# Patient Record
Sex: MALE | Race: WHITE | Employment: OTHER | URBAN - METROPOLITAN AREA
[De-identification: names, ages, dates, MRNs, and addresses within clinical notes are randomized per-mention and may not be internally consistent; named-entity substitution may affect disease eponyms.]

---

## 2017-08-03 ENCOUNTER — HOSPITAL ENCOUNTER (OUTPATIENT)
Dept: MRI IMAGING | Age: 52
Discharge: HOME | End: 2017-08-03
Attending: NEUROLOGICAL SURGERY
Payer: COMMERCIAL

## 2017-08-03 DIAGNOSIS — M54.16 LUMBAR RADICULOPATHY: ICD-10-CM

## 2017-08-03 PROCEDURE — 72148 MRI LUMBAR SPINE W/O DYE: CPT

## 2017-08-10 PROBLEM — E55.9 VITAMIN D DEFICIENCY: Status: ACTIVE | Noted: 2017-08-10

## 2017-08-10 PROBLEM — F51.04 CHRONIC INSOMNIA: Status: ACTIVE | Noted: 2017-08-10

## 2017-08-10 PROBLEM — R39.198 DIFFICULTY IN URINATION: Status: ACTIVE | Noted: 2017-08-10

## 2017-08-10 PROBLEM — F11.20 OPIOID DEPENDENCE (HCC): Status: ACTIVE | Noted: 2017-08-10

## 2017-08-10 PROBLEM — E03.9 HYPOTHYROIDISM: Status: ACTIVE | Noted: 2017-08-10

## 2017-08-10 PROBLEM — E29.1 HYPOGONADISM MALE: Status: ACTIVE | Noted: 2017-08-10

## 2017-08-10 PROBLEM — M79.646 THUMB PAIN: Status: ACTIVE | Noted: 2017-08-10

## 2017-08-10 RX ORDER — LANOLIN ALCOHOL/MO/W.PET/CERES
CREAM (GRAM) TOPICAL DAILY
COMMUNITY
End: 2018-08-10 | Stop reason: ALTCHOICE

## 2017-08-10 RX ORDER — TESTOSTERONE 30 MG/1.5ML
SOLUTION TOPICAL
COMMUNITY
End: 2017-08-22

## 2017-08-10 RX ORDER — BISMUTH SUBSALICYLATE 262 MG
1 TABLET,CHEWABLE ORAL DAILY
COMMUNITY

## 2017-08-13 PROBLEM — N20.0 KIDNEY STONES: Status: ACTIVE | Noted: 2017-08-13

## 2017-08-13 PROBLEM — G89.29 CHRONIC BACK PAIN: Status: ACTIVE | Noted: 2017-08-13

## 2017-08-13 PROBLEM — M54.9 CHRONIC BACK PAIN: Status: ACTIVE | Noted: 2017-08-13

## 2017-08-13 PROBLEM — E27.1 ADDISON'S DISEASE (HCC): Status: ACTIVE | Noted: 2017-08-13

## 2017-08-22 ENCOUNTER — OFFICE VISIT (OUTPATIENT)
Dept: INTERNAL MEDICINE CLINIC | Age: 52
End: 2017-08-22

## 2017-08-22 VITALS
HEART RATE: 74 BPM | WEIGHT: 203 LBS | SYSTOLIC BLOOD PRESSURE: 118 MMHG | DIASTOLIC BLOOD PRESSURE: 80 MMHG | BODY MASS INDEX: 28.42 KG/M2 | HEIGHT: 71 IN

## 2017-08-22 DIAGNOSIS — M54.41 CHRONIC RIGHT-SIDED LOW BACK PAIN WITH RIGHT-SIDED SCIATICA: ICD-10-CM

## 2017-08-22 DIAGNOSIS — D75.1 POLYCYTHEMIA: ICD-10-CM

## 2017-08-22 DIAGNOSIS — R07.2 PRECORDIAL PAIN: ICD-10-CM

## 2017-08-22 DIAGNOSIS — E27.1 ADDISON'S DISEASE (HCC): ICD-10-CM

## 2017-08-22 DIAGNOSIS — F11.20 UNCOMPLICATED OPIOID DEPENDENCE (HCC): ICD-10-CM

## 2017-08-22 DIAGNOSIS — F17.210 CIGARETTE NICOTINE DEPENDENCE WITHOUT COMPLICATION: ICD-10-CM

## 2017-08-22 DIAGNOSIS — Z00.00 ROUTINE PHYSICAL EXAMINATION: Primary | ICD-10-CM

## 2017-08-22 DIAGNOSIS — R97.20 RISING PSA LEVEL: ICD-10-CM

## 2017-08-22 DIAGNOSIS — G89.29 CHRONIC RIGHT-SIDED LOW BACK PAIN WITH RIGHT-SIDED SCIATICA: ICD-10-CM

## 2017-08-22 DIAGNOSIS — E29.1 HYPOGONADISM MALE: ICD-10-CM

## 2017-08-22 DIAGNOSIS — E03.9 ACQUIRED HYPOTHYROIDISM: ICD-10-CM

## 2017-08-22 PROBLEM — R07.9 CHEST PAIN: Status: ACTIVE | Noted: 2017-08-22

## 2017-08-22 RX ORDER — PHENOL/SODIUM PHENOLATE
20 AEROSOL, SPRAY (ML) MUCOUS MEMBRANE
Status: ON HOLD | COMMUNITY
End: 2022-09-30

## 2017-08-22 RX ORDER — TESTOSTERONE CYPIONATE 200 MG/ML
100 INJECTION INTRAMUSCULAR EVERY 2 WEEKS
COMMUNITY

## 2017-08-22 RX ORDER — DICLOFENAC SODIUM 75 MG/1
75 TABLET, DELAYED RELEASE ORAL AS NEEDED
COMMUNITY
End: 2018-08-22

## 2017-08-22 NOTE — PATIENT INSTRUCTIONS
Back Pain: Care Instructions  Your Care Instructions    Back pain has many possible causes. It is often related to problems with muscles and ligaments of the back. It may also be related to problems with the nerves, discs, or bones of the back. Moving, lifting, standing, sitting, or sleeping in an awkward way can strain the back. Sometimes you don't notice the injury until later. Arthritis is another common cause of back pain. Although it may hurt a lot, back pain usually improves on its own within several weeks. Most people recover in 12 weeks or less. Using good home treatment and being careful not to stress your back can help you feel better sooner. Follow-up care is a key part of your treatment and safety. Be sure to make and go to all appointments, and call your doctor if you are having problems. Its also a good idea to know your test results and keep a list of the medicines you take. How can you care for yourself at home? · Sit or lie in positions that are most comfortable and reduce your pain. Try one of these positions when you lie down:  ¨ Lie on your back with your knees bent and supported by large pillows. ¨ Lie on the floor with your legs on the seat of a sofa or chair. Lyndy Newer on your side with your knees and hips bent and a pillow between your legs. ¨ Lie on your stomach if it does not make pain worse. · Do not sit up in bed, and avoid soft couches and twisted positions. Bed rest can help relieve pain at first, but it delays healing. Avoid bed rest after the first day of back pain. · Change positions every 30 minutes. If you must sit for long periods of time, take breaks from sitting. Get up and walk around, or lie in a comfortable position. · Try using a heating pad on a low or medium setting for 15 to 20 minutes every 2 or 3 hours. Try a warm shower in place of one session with the heating pad. · You can also try an ice pack for 10 to 15 minutes every 2 to 3 hours.  Put a thin cloth between the ice pack and your skin. · Take pain medicines exactly as directed. ¨ If the doctor gave you a prescription medicine for pain, take it as prescribed. ¨ If you are not taking a prescription pain medicine, ask your doctor if you can take an over-the-counter medicine. · Take short walks several times a day. You can start with 5 to 10 minutes, 3 or 4 times a day, and work up to longer walks. Walk on level surfaces and avoid hills and stairs until your back is better. · Return to work and other activities as soon as you can. Continued rest without activity is usually not good for your back. · To prevent future back pain, do exercises to stretch and strengthen your back and stomach. Learn how to use good posture, safe lifting techniques, and proper body mechanics. When should you call for help? Call your doctor now or seek immediate medical care if:  · You have new or worsening numbness in your legs. · You have new or worsening weakness in your legs. (This could make it hard to stand up.)  · You lose control of your bladder or bowels. Watch closely for changes in your health, and be sure to contact your doctor if:  · Your pain gets worse. · You are not getting better after 2 weeks. Where can you learn more? Go to http://chuck-edgardo.info/. Enter B909 in the search box to learn more about \"Back Pain: Care Instructions. \"  Current as of: March 21, 2017  Content Version: 11.3  © 4038-9681 [employer]. Care instructions adapted under license by [employer] (which disclaims liability or warranty for this information). If you have questions about a medical condition or this instruction, always ask your healthcare professional. [address] any warranty or liability for your use of this information.

## 2017-08-22 NOTE — PROGRESS NOTES
Lanny De La Cruz. is a 47 y.o. male presenting for Complete Physical  .     1. Have you been to the ER, urgent care clinic since your last visit? Hospitalized since your last visit? No    2. Have you seen or consulted any other health care providers outside of the [address] [place] [address] since your last visit? Include any pap smears or colon screening. Yes When: 8/2017 Where: Dr. Lucrecia Hernandez Reason for visit: endocrinologist    No flowsheet data found. No flowsheet data found. PHQ over the last two weeks 8/22/2017   Little interest or pleasure in doing things Not at all   Feeling down, depressed or hopeless Not at all   Total Score PHQ 2 0       There are no discontinued medications.

## 2017-08-22 NOTE — MR AVS SNAPSHOT
Visit Information Date & Time Provider Department Dept. Phone Encounter #  
 8/22/2017  3:00 PM Webster Litten, MD [facility]  [phone] [id number] Follow-up Instructions Return in about 3 months (around 11/22/2017) for As previously scheduled. Upcoming Health Maintenance Date Due Pneumococcal 19-64 Medium Risk (1 of 1 - PPSV23) 7/31/1985 DTaP/Tdap/Td series (1 - Tdap) 7/31/1987 FOBT Q 1 YEAR AGE 50-75 7/31/2016 INFLUENZA AGE 9 TO ADULT 8/1/2017 Allergies as of 8/22/2017  Review Complete On: 8/22/2017 By: Webster Litten, MD  
  
 Severity Noted Reaction Type Reactions Iodinated Contrast- Oral And Iv Dye High 01/14/2011   Systemic Hives Current Immunizations  Never Reviewed No immunizations on file. Not reviewed this visit You Were Diagnosed With   
  
 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 9 Chronic right-sided low back pain with right-sided sciatica     ICD-10-CM: M54.41, G89.29 ICD-9-CM: 724.2, 724.3, 338.29 Uncomplicated opioid dependence ([facility]     ICD-10-CM: R63.75 ICD-9-CM: 304.00 Precordial pain     ICD-10-CM: R07.2 ICD-9-CM: 786.51 Rising PSA level     ICD-10-CM: R97.20 ICD-9-CM: 790.93 Vitals BP Pulse Height(growth percentile) Weight(growth percentile) BMI Smoking Status 118/80 (BP 1 Location: Right arm, BP Patient Position: Sitting) 74 5' 11\" (1.803 m) 203 lb (92.1 kg) 28.31 kg/m2 Current Every Day Smoker BMI and BSA Data Body Mass Index Body Surface Area  
 28.31 kg/m 2 2.15 m 2 Your Updated Medication List  
  
   
This list is accurate as of: 8/22/17  3:52 PM.  Always use your most recent med list.  
  
 diclofenac EC 75 mg EC tablet Commonly known as:  VOLTAREN Take 75 mg by mouth as needed. gabapentin 100 mg capsule Commonly known as:  NEURONTIN Take 600 mg by mouth two (2) times a day. Back pain  
  
 hydrocortisone 10 mg tablet Commonly known as:  CORTEF Take 20 mg by mouth every morning. LEVOXYL 100 mcg tablet Generic drug:  levothyroxine Take 125 mcg by mouth Daily (before breakfast). METHADONE PO Take 50 mg by mouth every morning. MULTIVITAMIN PO Take  by mouth. Omeprazole delayed release 20 mg tablet Commonly known as:  PRILOSEC D/R Take 20 mg by mouth daily. simvastatin 10 mg tablet Commonly known as:  ZOCOR Take 40 mg by mouth nightly. Indications: HYPERCHOLESTEROLEMIA  
  
 testosterone cypionate 200 mg/mL injection Commonly known as:  DEPOTESTOTERONE CYPIONATE 75 mg by IntraMUSCular route Once every 2 weeks. VITAMIN B-1 100 mg tablet Generic drug:  thiamine Take  by mouth daily. We Performed the Following AMB POC EKG ROUTINE W/ 12 LEADS, INTER & REP [97837 CPT(R)] REFERRAL TO CARDIOLOGY [JQS99 Custom] Comments:  
 [address], [address] [place] [address] Follow-up Instructions Return in about 3 months (around 11/22/2017) for As previously scheduled. Referral Information Referral ID Referred By Referred To  
  
 [id number] [facility] [address] Visits Status Start Date End Date 1 New Request 8/22/17 8/22/18 If your referral has a status of pending review or denied, additional information will be sent to support the outcome of this decision. Introducing [facility]! Ofe Aguilar introduces "[employer] patient portal. Now you can access parts of your medical record, email your doctor's office, and request medication refills online. 1. In your internet browser, go to [email or web address] 2. Click on the First Time User? Click Here link in the Sign In box. You will see the New Member Sign Up page. 3. Enter your [employer] Access Code exactly as it appears below. You will not need to use this code after youve completed the sign-up process. If you do not sign up before the expiration date, you must request a new code. · [email or web address] Access Code: [email or web address] Expires: 10/25/2017  4:08 PM 
 
4. Enter the last four digits of your Social Security Number (xxxx) and Date of Birth (mm/dd/yyyy) as indicated and click Submit. You will be taken to the next sign-up page. 5. Create a FaithStreett ID. This will be your [employer] login ID and cannot be changed, so think of one that is secure and easy to remember. 6. Create a [employer] password. You can change your password at any time. 7. Enter your Password Reset Question and Answer. This can be used at a later time if you forget your password. 8. Enter your e-mail address. You will receive e-mail notification when new information is available in [address] 9. Click Sign Up. You can now view and download portions of your medical record. 10. Click the Download Summary menu link to download a portable copy of your medical information. If you have questions, please visit the Frequently Asked Questions section of the [email or web address] website. Remember, [employer] is NOT to be used for urgent needs. For medical emergencies, dial 911. Now available from your iPhone and Android! Please provide this summary of care documentation to your next provider. Your primary care clinician is listed as [facility]. If you have any questions after today's visit, please call [phone].

## 2017-08-22 NOTE — LETTER
8/22/2017 4:43 PM 
 
RE:    [address] [address] Thank you for agreeing to see Jairon Segovai I am referring my patient to you for evaluation of chest pain and intermittent SOB. I appreciate your assistance in Mr. Corazon Johnston care  and look forward to your findings and recommendations. Sincerely, Verner Auerbach, MD

## 2017-08-22 NOTE — PROGRESS NOTES
This note will not be viewable in [address] Subjective:     Fanny Claire. is a 47 y.o. male presenting for annual comprehensive personal healthcare examination. The patient is a 67-year-old male who presents to the office today for complete checkup. Patient has multiple problems which have been followed by Dr. Yfn Carlton recently. These include Alfonso's disease hypothyroidism and hypogonadism. The patient is receiving testosterone injections along with thyroid replacement and hydrocortisone. What spurred his physical exam today is that his PSA romero from the last time he had it checked. He is having no urinary flow problems and denies any family history of prostate cancer. Patient has a long history of opioid dependence and has been on methadone taper for a number of years. The patient has had chronic back pain and tapered his methadone from 90 mg to 50 mg over the last few years but never has tapered down further as it is helped to control his chronic back pain. He is due to be seen by Dr. Divine Kellogg in the next week or 2 regarding his back and possible surgery to correct his back problems. The patient also complains today of chest pain. This is been ongoing for 2-3 months initially was occurring once a week and now has changed to every other week. He will develop substernal chest pressure which will last for 5 minutes at a time. This usually does not occur with physical activity but on one occasion awoke him. The patient is a smoker of one pack of cigarettes per day and has a family history of a grandmother having  of an MI in the past.  Over the last 2 weeks he has had some random shortness of breath which increases his anxiety. Patient has never had a colonoscopy but denies changes in bowel habits. He also notes that he snores heavily and is chronically fatigued. History of present illness: This patient has multiple medical problems.   These include:  Patient Active Problem List   Diagnosis Code    Hypogonadism male E29.1    Opioid dependence (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     Chest pain 8/22/2017    Chronic back pain 8/13/2017    Chronic insomnia 8/10/2017    Difficulty in urination 8/10/2017    Endocrine disease     alfonso's disease    Hypercholesteremia     Hypogonadism male 8/10/2017    Hypothyroidism 8/10/2017    Kidney stones     multiple, has had lithotripsey as well as passed on his own    Opioid dependence ([facility] 8/10/2017    Other ill-defined conditions     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, on methadone    Thumb pain 8/10/2017    Vitamin D deficiency 8/10/2017     Past Surgical History:   Procedure Laterality Date    HX OTHER SURGICAL      pylonidal cyst    HX OTHER SURGICAL      penial fracture     Allergies   Allergen Reactions    Iodinated Contrast- Oral And Iv Dye Hives     Current Outpatient Prescriptions   Medication Sig Dispense Refill    diclofenac EC (VOLTAREN) 75 mg EC tablet Take 75 mg by mouth as needed.  Omeprazole delayed release (PRILOSEC D/R) 20 mg tablet Take 20 mg by mouth daily.  testosterone cypionate (DEPOTESTOTERONE CYPIONATE) 200 mg/mL injection 75 mg by IntraMUSCular route Once every 2 weeks.  MULTIVITAMIN PO Take  by mouth.  thiamine (VITAMIN B-1) 100 mg tablet Take  by mouth daily.  METHADONE HCL (METHADONE PO) Take 50 mg by mouth every morning.  simvastatin (ZOCOR) 10 mg tablet Take 40 mg by mouth nightly.  Indications: HYPERCHOLESTEROLEMIA      levothyroxine (LEVOXYL) 100 mcg tablet Take 125 mcg by mouth Daily (before breakfast).  hydrocortisone (CORTEF) 10 mg tablet Take 20 mg by mouth every morning.  gabapentin (NEURONTIN) 100 mg capsule Take 600 mg by mouth two (2) times a day. Back pain       Social History     Social History    Marital status:      Spouse name: N/A    Number of children: N/A    Years of education: N/A     Social History Main Topics    Smoking status: Current Every Day Smoker     Packs/day: 1.00    Smokeless tobacco: Never Used    Alcohol use No    Drug use: No    Sexual activity: Not Asked     Other Topics Concern    None     Social History Narrative     Family History   Problem Relation Age of Onset    Cancer Mother      breast    Stroke Father     Cancer Sister      breast    Cancer Sister      breast       Health Maintenance   Topic Date Due    Pneumococcal 19-64 Medium Risk (1 of 1 - PPSV23) 07/31/1985    DTaP/Tdap/Td series (1 - Tdap) 07/31/1987    FOBT Q 1 YEAR AGE 50-75  07/31/2016    INFLUENZA AGE 9 TO ADULT  08/01/2017       Review of Systems  Constitutional:  He denies fevers, weight loss, sweats. Positive for snoring and fatigue. HEENT:  No blurred or double vision, headaches or dizziness. No difficulty with swallowing, taste, speech or smell. Respiratory:  No cough, wheezing. Positive for intermittent SOB   Cardiac:  Denies  palpitations, unexplained indigestion, syncope, edema, PND or orthopnea. Positive for intermittent SSCP  GI:  No changes in bowel habits, abdominal pain, no bloating, anorexia, nausea, vomiting or heartburn. :  He denies frequency, nocturia, stranguria, dysuria or sexual dysfunction. Extremities:  No joint pain, stiffness or swelling. Positive for chronic LBP  Skin:  No recent rash or mole changes.     Neurological:  Positive for radicular pain in sciatic distribution to right leg  ROS otherwise negative       Objective:     Vitals:    08/22/17 1510   BP: 118/80   Pulse: 74 Weight: 203 lb (92.1 kg)   Height: 5' 11\" (1.803 m)   PainSc:   4   PainLoc: Back      Body mass index is 28.31 kg/(m^2). Physical exam:   General Appearance:  Well-developed, well-nourished, no acute distress. Vision:  Deferred to ophthalmologist.    Hearing: HEENT:    Ears:  The TMs and ear canals were clear. Eyes:  The pupillary responses were normal.  Extraocular muscle function intact. Lids and conjunctiva not injected. Funduscopic exam revealed sharp disc margins. Pharynx:  Clear with teeth in good repair. No masses were noted. Neck:  Supple without thyromegaly or adenopathy. No JVD noted. No carotid bruits. Lungs: Clear to auscultation and percussion. Cardiac:  Regular rate and rhythm. No murmur. PMI not displaced. No gallop, rub or click. Abdomen:  Flat, soft, non-tender without palpable organomegaly or mass. No pulsatile mass was felt, and no bruit was heard. Bowel sounds were active. Rectal exam:  Normal sphincter tone. Prostate 1+ and without nodule. No tenderness. No rectal masses felt. Stool brown and Heme negative. Extremities:  No clubbing, cyanosis or edema. Pulses:  Dorsalis pedis and posterior tibial pulses felt without difficulty. Skin:  No unusual rash or mole changes noted. Lymph Nodes:  None felt in the cervical, supraclavicular, axillary or inguinal region. Neurological:  Cranial nerves II-XII grossly intact. Motor strength 5/5. DTRs 2+ and symmetric.   Station and gait normal.         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 We will refer to cardiology for workup of his chest pain and shortness of breath    Discontinuation of smoking encouraged    Patient has a PSA which is elevated from a previous baseline of 0.7. I have recommended that he have his PSA rechecked in 3 months and if still elevated a urological evaluation would be in order. Continued follow-up with Dr. Tyrese Lao regarding his Alfonso's, hypogonadism, hypothyroidism. Worry about his polycythemia most likely related to smoking and his testosterone replacement. Evaluation by Dr. Alec Mcrae in the near future regarding his chronic back issues. Have recommended colonoscopy be done once the above issues have been settled. Patient will also need at some point a sleep study to rule out sleep apnea. Encouraged methadone tapering once back issues are improved. Follow-up here to be determined    Follow-up Disposition:  Return in about 3 months (around 11/22/2017) for As previously scheduled.     Alicia Mcdonald MD

## 2018-08-07 ENCOUNTER — HOSPITAL ENCOUNTER (OUTPATIENT)
Dept: PREADMISSION TESTING | Age: 53
Discharge: HOME | End: 2018-08-07
Payer: COMMERCIAL

## 2018-08-07 VITALS
HEIGHT: 71 IN | WEIGHT: 190 LBS | BODY MASS INDEX: 26.6 KG/M2 | TEMPERATURE: 98.5 F | SYSTOLIC BLOOD PRESSURE: 122 MMHG | HEART RATE: 66 BPM | DIASTOLIC BLOOD PRESSURE: 73 MMHG

## 2018-08-07 LAB
ABO + RH BLD: NORMAL
ANION GAP SERPL CALC-SCNC: 8 MMOL/L (ref 5–15)
APPEARANCE UR: CLEAR
ATRIAL RATE: 59 BPM
ATRIAL RATE: 60 BPM
BACTERIA URNS QL MICRO: NEGATIVE /HPF
BILIRUB UR QL: NEGATIVE
BLOOD GROUP ANTIBODIES SERPL: NORMAL
BUN SERPL-MCNC: 14 MG/DL (ref 6–20)
BUN/CREAT SERPL: 14 (ref 12–20)
CALCIUM SERPL-MCNC: 9 MG/DL (ref 8.5–10.1)
CALCULATED R AXIS, ECG10: 51 DEGREES
CALCULATED R AXIS, ECG10: 52 DEGREES
CALCULATED T AXIS, ECG11: 66 DEGREES
CALCULATED T AXIS, ECG11: 68 DEGREES
CAOX CRY URNS QL MICRO: ABNORMAL
CHLORIDE SERPL-SCNC: 101 MMOL/L (ref 97–108)
CO2 SERPL-SCNC: 30 MMOL/L (ref 21–32)
COLOR UR: ABNORMAL
CREAT SERPL-MCNC: 1.01 MG/DL (ref 0.7–1.3)
DIAGNOSIS, 93000: NORMAL
DIAGNOSIS, 93000: NORMAL
EPITH CASTS URNS QL MICRO: ABNORMAL /LPF
ERYTHROCYTE [DISTWIDTH] IN BLOOD BY AUTOMATED COUNT: 12.7 % (ref 11.5–14.5)
EST. AVERAGE GLUCOSE BLD GHB EST-MCNC: 120 MG/DL
GLUCOSE SERPL-MCNC: 101 MG/DL (ref 65–100)
GLUCOSE UR STRIP.AUTO-MCNC: NEGATIVE MG/DL
HBA1C MFR BLD: 5.8 % (ref 4.2–6.3)
HCT VFR BLD AUTO: 52.4 % (ref 36.6–50.3)
HGB BLD-MCNC: 17.4 G/DL (ref 12.1–17)
HGB UR QL STRIP: NEGATIVE
INR PPP: 1.2 (ref 0.9–1.1)
KETONES UR QL STRIP.AUTO: ABNORMAL MG/DL
LEUKOCYTE ESTERASE UR QL STRIP.AUTO: NEGATIVE
MCH RBC QN AUTO: 33.3 PG (ref 26–34)
MCHC RBC AUTO-ENTMCNC: 33.2 G/DL (ref 30–36.5)
MCV RBC AUTO: 100.2 FL (ref 80–99)
MUCOUS THREADS URNS QL MICRO: ABNORMAL /LPF
NITRITE UR QL STRIP.AUTO: NEGATIVE
NRBC # BLD: 0 K/UL (ref 0–0.01)
NRBC BLD-RTO: 0 PER 100 WBC
P-R INTERVAL, ECG05: 170 MS
P-R INTERVAL, ECG05: 176 MS
PH UR STRIP: 5.5 [PH] (ref 5–8)
PLATELET # BLD AUTO: 248 K/UL (ref 150–400)
PMV BLD AUTO: 9.5 FL (ref 8.9–12.9)
POTASSIUM SERPL-SCNC: 4.3 MMOL/L (ref 3.5–5.1)
PROT UR STRIP-MCNC: NEGATIVE MG/DL
PROTHROMBIN TIME: 11.8 SEC (ref 9–11.1)
Q-T INTERVAL, ECG07: 394 MS
Q-T INTERVAL, ECG07: 398 MS
QRS DURATION, ECG06: 84 MS
QRS DURATION, ECG06: 88 MS
QTC CALCULATION (BEZET), ECG08: 394 MS
QTC CALCULATION (BEZET), ECG08: 394 MS
RBC # BLD AUTO: 5.23 M/UL (ref 4.1–5.7)
RBC #/AREA URNS HPF: ABNORMAL /HPF (ref 0–5)
SODIUM SERPL-SCNC: 139 MMOL/L (ref 136–145)
SP GR UR REFRACTOMETRY: 1.03 (ref 1–1.03)
SPECIMEN EXP DATE BLD: NORMAL
UA: UC IF INDICATED,UAUC: ABNORMAL
UROBILINOGEN UR QL STRIP.AUTO: 1 EU/DL (ref 0.2–1)
VENTRICULAR RATE, ECG03: 59 BPM
VENTRICULAR RATE, ECG03: 60 BPM
WBC # BLD AUTO: 7.7 K/UL (ref 4.1–11.1)
WBC URNS QL MICRO: ABNORMAL /HPF (ref 0–4)

## 2018-08-07 PROCEDURE — 80048 BASIC METABOLIC PNL TOTAL CA: CPT | Performed by: ORTHOPAEDIC SURGERY

## 2018-08-07 PROCEDURE — 81001 URINALYSIS AUTO W/SCOPE: CPT | Performed by: ORTHOPAEDIC SURGERY

## 2018-08-07 PROCEDURE — 36415 COLL VENOUS BLD VENIPUNCTURE: CPT | Performed by: ORTHOPAEDIC SURGERY

## 2018-08-07 PROCEDURE — 93005 ELECTROCARDIOGRAM TRACING: CPT

## 2018-08-07 PROCEDURE — 85027 COMPLETE CBC AUTOMATED: CPT | Performed by: ORTHOPAEDIC SURGERY

## 2018-08-07 PROCEDURE — 83036 HEMOGLOBIN GLYCOSYLATED A1C: CPT | Performed by: ORTHOPAEDIC SURGERY

## 2018-08-07 PROCEDURE — 86900 BLOOD TYPING SEROLOGIC ABO: CPT | Performed by: ORTHOPAEDIC SURGERY

## 2018-08-07 PROCEDURE — 85610 PROTHROMBIN TIME: CPT | Performed by: ORTHOPAEDIC SURGERY

## 2018-08-07 RX ORDER — OXYMETAZOLINE HCL 0.05 %
2 SPRAY, NON-AEROSOL (ML) NASAL 2 TIMES DAILY
COMMUNITY
End: 2021-10-15 | Stop reason: ALTCHOICE

## 2018-08-07 NOTE — PERIOP NOTES
PATIENT GIVEN SURGICAL SITE INFECTION FAQ HANDOUT AND HAND WASHING TIP SHEET. PREOP INSTRUCTIONS REVIEWED AND PATIENT VERBALIZES UNDERSTANDING OF INSTRUCTIONS. PATIENT HAS BEEN GIVEN THE OPPORTUNITY TO ASK ADDITIONAL QUESTIONS. PATIENT GIVEN 2 PACKAGES OF 6 CHG WIPES AND INSTRUCTIONS, PATIENT VERBALIZED UNDERSTANDING. PATIENT ON [facility] AND GOES TO [facility] TO GET HIS [facility]ONE PRESCRIPTIONS. DR. Anup Alicea WITH ANESTHESIA OK WITH PATIENT TAKING METHODONE THE MORNING OF SURGERY.

## 2018-08-08 LAB
BACTERIA SPEC CULT: NORMAL
BACTERIA SPEC CULT: NORMAL
SERVICE CMNT-IMP: NORMAL

## 2018-08-10 ENCOUNTER — OFFICE VISIT (OUTPATIENT)
Dept: INTERNAL MEDICINE CLINIC | Age: 53
End: 2018-08-10

## 2018-08-10 VITALS
DIASTOLIC BLOOD PRESSURE: 60 MMHG | HEIGHT: 71 IN | HEART RATE: 79 BPM | BODY MASS INDEX: 26.74 KG/M2 | SYSTOLIC BLOOD PRESSURE: 108 MMHG | WEIGHT: 191 LBS | OXYGEN SATURATION: 97 %

## 2018-08-10 DIAGNOSIS — G89.29 CHRONIC RIGHT-SIDED LOW BACK PAIN WITH RIGHT-SIDED SCIATICA: ICD-10-CM

## 2018-08-10 DIAGNOSIS — E27.1 ADDISON'S DISEASE (HCC): ICD-10-CM

## 2018-08-10 DIAGNOSIS — F11.20 UNCOMPLICATED OPIOID DEPENDENCE (HCC): ICD-10-CM

## 2018-08-10 DIAGNOSIS — Z01.818 PREOPERATIVE CLEARANCE: Primary | ICD-10-CM

## 2018-08-10 DIAGNOSIS — E03.9 ACQUIRED HYPOTHYROIDISM: ICD-10-CM

## 2018-08-10 DIAGNOSIS — R79.89 LOW TESTOSTERONE: ICD-10-CM

## 2018-08-10 DIAGNOSIS — M54.41 CHRONIC RIGHT-SIDED LOW BACK PAIN WITH RIGHT-SIDED SCIATICA: ICD-10-CM

## 2018-08-10 RX ORDER — B-COMPLEX WITH VITAMIN C
1 TABLET ORAL DAILY
Status: ON HOLD | COMMUNITY
End: 2022-10-06 | Stop reason: SDUPTHER

## 2018-08-10 NOTE — PROGRESS NOTES
Cindy Carlton. is a 47 y.o. male presenting for Pre-op Exam (back surgery with Dr. Carina Ham)  . 1. Have you been to the ER, urgent care clinic since your last visit? Hospitalized since your last visit? No    2. Have you seen or consulted any other health care providers outside of the [address] [place] [address] since your last visit? Include any pap smears or colon screening. Yes When: 8/2018 Where: Dr. Carina Ham Reason for visit: back pain    No flowsheet data found. Abuse Screening Questionnaire 8/10/2018   Do you ever feel afraid of your partner? N   Are you in a relationship with someone who physically or mentally threatens you? N   Is it safe for you to go home? Y       PHQ over the last two weeks 8/10/2018   Little interest or pleasure in doing things Not at all   Feeling down, depressed, irritable, or hopeless Not at all   Total Score PHQ 2 0       There are no discontinued medications.

## 2018-08-10 NOTE — PATIENT INSTRUCTIONS
Alfonso's Disease: Care Instructions  Your Care Instructions    Alfonso's disease is a rare condition. It develops when the adrenal glands, which are located above the kidneys, do not make enough of certain hormones. These hormones are important for normal body function. They help the body cope with stress, hold salt and water, and maintain blood pressure. [place]ard's disease usually develops when part of the adrenal glands are destroyed by the body's defenses, called the immune system, or by diseases such as tuberculosis or cancer. Some types of surgery and radiation treatments, bleeding caused by blood-thinning medicine, and injury to the gland from a blow to the back, from a motor vehicle accident, or from pregnancy or delivery also can cause the disease. You will probably need to take medicine for the rest of your life to treat your condition and help prevent an adrenal crisis. A crisis is a steep drop in blood pressure and blood sugar levels caused by a stressful event, such as an infection, an injury, surgery, or dehydration. Over time, if you do not get treatment, too little adrenal hormone can cause other symptoms, such as too much skin pigment. This can cause you to look tan. You also may lose weight and be extremely tired. Follow-up care is a key part of your treatment and safety. Be sure to make and go to all appointments, and call your doctor if you are having problems. It's also a good idea to know your test results and keep a list of the medicines you take. How can you care for yourself at home? · Take your medicines exactly as prescribed. Call your doctor if you think you are having a problem with your medicine. You will get more details on the specific medicines your doctor prescribes. You will have to take medicines for the rest of your life. · Do not reduce salt in your diet.  You may need to add extra salt to your food during hot and humid weather or after exercise to replace salt lost through sweating. · Do not use salt substitutes. · Carry a medical ID card or bracelet that states that you have [place]'s disease. · Keep track of your weight, especially if you have not been hungry or you have been vomiting. Weigh yourself at the same time of day while wearing the same amount of clothing. Ask your doctor when he or she wants to be notified about weight loss or frequent vomiting. · Keep track of your blood pressure. High blood pressure and swelling may mean that your medicine needs to be adjusted. Also, if you notice that you become lightheaded when you first get up in the morning, your blood pressure may be low. Sit on the edge of your bed for a while before standing. Let your doctor know if this problem gets worse. · During illness or stress, you may need to increase your dose of medicine. Talk with your doctor about when and how much you should increase your medicine. Have clear instructions written out for which medicine you should increase and how much you should increase it. · Have a shot of emergency medicine ready at your home, at work or school, and in the car. Know when and how to give yourself the medicine. Have instructions written out, and teach someone else how to give you the medicine in case you cannot give it to yourself. When should you call for help? Call 911 anytime you think you may need emergency care. For example, call if:    · You have an adrenal crisis. Symptoms may include:  ¨ Severe vomiting and diarrhea. ¨ Extreme weakness or feeling that you are going to pass out. ¨ Sudden pain in the belly, lower back, and legs. ¨ Strange behavior, such as feeling confused or fearful. ¨ Fainting or trouble staying awake. ¨ A high fever.   ¨ A pale face and blue lips and earlobes.     · You are not able to take your medicine by mouth.    Call your doctor now or seek immediate medical care if:    · You have a fever.     · You have a cough that does not go away.     · You have burning when you urinate.     · You have signs of infection, such as:  ¨ Increased pain, swelling, warmth, or redness. ¨ Red streaks leading from a wound. ¨ Pus draining from a wound. ¨ A fever.    Watch closely for changes in your health, and be sure to contact your doctor if:    · You have a minor illness that does not go away.     · You have trouble dealing with stress. Where can you learn more? Go to http://chuck-edgardo.info/. Enter R077 in the search box to learn more about \"[place]'s Disease: Care Instructions. \"  Current as of: May 12, 2017  Content Version: 11.7  © 6344-2351 [employer]. Care instructions adapted under license by [employer] (which disclaims liability or warranty for this information). If you have questions about a medical condition or this instruction, always ask your healthcare professional. [address] any warranty or liability for your use of this information.

## 2018-08-10 NOTE — MR AVS SNAPSHOT
[address] [place] [address] [address] [id number] [phone] Patient: Billy Bustillo MRN: [id number] :1965 Visit Information Date & Time Provider Department Dept. Phone Encounter #  
 [facility]10[facility]2018 [id number] Follow-up Instructions Return for As previously scheduled. Upcoming Health Maintenance Date Due Pneumococcal 19-64 Medium Risk (1 of 1 - PPSV23) 1985 DTaP/Tdap/Td series (1 - Tdap) 1987 FOBT Q 1 YEAR AGE 50-75 2016 Influenza Age 5 to Adult 2018 Allergies as of 8/10/2018  Review Complete On: 8/10/2018 By: Kaitlynn Browne MD  
  
 Severity Noted Reaction Type Reactions Iodinated Contrast- Oral And Iv Dye High 2011   Systemic Hives Current Immunizations  Never Reviewed No immunizations on file. Not reviewed this visit You Were Diagnosed With   
  
 Codes Comments Preoperative clearance    -  Primary ICD-10-CM: B20.647 ICD-9-CM: V72.84 Chronic right-sided low back pain with right-sided sciatica     ICD-10-CM: M54.41, G89.29 ICD-9-CM: 724.2, 724.3, 338.29 Acquired hypothyroidism     ICD-10-CM: E03.9 ICD-9-CM: 244.9 [place]'s disease ([facility]     ICD-10-CM: E27.1 ICD-9-CM: 255.41 Uncomplicated opioid dependence ([facility]     ICD-10-CM: Z78.80 ICD-9-CM: 304.00 Low testosterone     ICD-10-CM: R79.89 ICD-9-CM: 790.99 Vitals BP Pulse Height(growth percentile) Weight(growth percentile) SpO2 BMI  
 108/60 (BP 1 Location: Right arm, BP Patient Position: Sitting) 79 5' 11\" (1.803 m) 191 lb (86.6 kg) 97% 26.64 kg/m2 Smoking Status Current Every Day Smoker BMI and BSA Data Body Mass Index Body Surface Area  
 26.64 kg/m 2 2.08 m 2 Preferred Pharmacy Pharmacy Name Phone [phone] [phone] Your Updated Medication List  
  
   
This list is accurate as of 8/10/18 10:30 AM.  Always use your most recent med list.  
  
  
  
  
 AFRIN (OXYMETAZOLINE) 0.05 % nasal spray Generic drug:  oxymetazoline 2 Sprays by Both Nostrils route two (2) times a day. b-complex with vitamin c tablet Take 1 Tab by mouth daily. diclofenac EC 75 mg EC tablet Commonly known as:  VOLTAREN Take 75 mg by mouth as needed. gabapentin 100 mg capsule Commonly known as:  NEURONTIN Take 600 mg by mouth two (2) times a day. Back pain  
  
 hydrocortisone 10 mg tablet Commonly known as:  CORTEF Take 20 mg by mouth every morning. LEVOXYL 100 mcg tablet Generic drug:  levothyroxine Take 125 mcg by mouth Daily (before breakfast). METHADONE PO Take 60 mg by mouth every morning. MULTIVITAMIN PO Take 1 Tab by mouth daily. Omeprazole delayed release 20 mg tablet Commonly known as:  PRILOSEC D/R Take 20 mg by mouth nightly. simvastatin 10 mg tablet Commonly known as:  ZOCOR Take 40 mg by mouth nightly. Indications: HYPERCHOLESTEROLEMIA  
  
 testosterone cypionate 200 mg/mL injection Commonly known as:  DEPOTESTOTERONE CYPIONATE 75 mg by IntraMUSCular route Once every 2 weeks. LAST DOSE 8/6/18. Follow-up Instructions Return for As previously scheduled. Introducing [facility]! Lenard Deutsch introduces SocialFlow patient portal. Now you can access parts of your medical record, email your doctor's office, and request medication refills online. 1. In your internet browser, go to [email or web address] 2. Click on the First Time User? Click Here link in the Sign In box. You will see the New Member Sign Up page. 3. Enter your SocialFlow Access Code exactly as it appears below.  You will not need to use this code after youve completed the sign-up process. If you do not sign up before the expiration date, you must request a new code. · Vibrow Access Code: [email or web address] Expires: 11/5/2018  8:59 AM 
 
4. Enter the last four digits of your Social Security Number (xxxx) and Date of Birth (mm/dd/yyyy) as indicated and click Submit. You will be taken to the next sign-up page. 5. Create a Vibrow ID. This will be your Vibrow login ID and cannot be changed, so think of one that is secure and easy to remember. 6. Create a Vibrow password. You can change your password at any time. 7. Enter your Password Reset Question and Answer. This can be used at a later time if you forget your password. 8. Enter your e-mail address. You will receive e-mail notification when new information is available in [address] 9. Click Sign Up. You can now view and download portions of your medical record. 10. Click the Download Summary menu link to download a portable copy of your medical information. If you have questions, please visit the Frequently Asked Questions section of the Vibrow website. Remember, Vibrow is NOT to be used for urgent needs. For medical emergencies, dial 911. Now available from your iPhone and Android! Please provide this summary of care documentation to your next provider. Your primary care clinician is listed as [facility]. If you have any questions after today's visit, please call [phone].

## 2018-08-10 NOTE — PROGRESS NOTES
This note will not be viewable in [address] History:   Mr. Pierce Arredondo is a 56-year-old  male referred to our office today by Dr. Abby Weston in medical consultation for preoperative medical clearance prior to having lumbar back surgery performed at L2-3 on August 20 at [facility].    The patient gives a long history of low back pain which is been ongoing for about 15 years but has worsened in the last 3 years. The patient has had numerous facet joint injections as well as one epidural steroid injection and a nerve ablation without any relief of symptoms. Because of his back pain the patient became addicted to opiates and is currently on methadone which is been managed by the [address]. The patient does not know the name of the doctor who is actually managing his methadone. The patient notes chronic daily back pain with occasional radiation of the pain into the right leg. He has had no lower extremity weakness or bowel or bladder incontinence. He notes that his pain has been chronic in his back and limits his activities of daily living. He is now presenting for elective repair. Patient recently had an MRI performed which revealed:    Progressive severe degenerative changes at L2-3 with right lateral recess  stenosis and moderate right neural foraminal narrowing.   Mild to moderate right neural foraminal narrowing at L3-4 and L4-5.   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 Rafi Belcher. He also has hypercholesterolemia for which he is on statin therapy but has no history of coronary artery disease and denies exertional chest pain or claudication. As previously noted the patient has been on methadone for pain relief along with gabapentin and as needed diclofenac.   He does take omeprazole for acid reflux and GI protection. The patient is allergic to contrast dye but denies latex allergy or Band-Aid adhesive allergy. He has never had problems with anesthesia. The patient does smoke 1 pack of cigarettes per day but gives no history of asthma or COPD and denies shortness of breath or sputum production. The review of systems is otherwise negative is noted    Latex Allergy:NO    History of anesthesia reaction: NO:     History of PE/DVT:NO:       Past Medical History:   Diagnosis Date    [facility]     Chest pain 8/22/2017    Chronic back pain 8/13/2017    Chronic insomnia 8/10/2017    Difficulty in urination 8/10/2017    Endocrine disease     alfonso's disease    GERD (gastroesophageal reflux disease)     Hypercholesteremia     Hypogonadism male 8/10/2017    Hypothyroidism 8/10/2017    Kidney stones     multiple, has had lithotripsey as well as passed on his own    Low testosterone 8/10/2018    Nicotine vapor product user     OCCASSIONAL     Opioid dependence ([facility] 8/10/2017    Other ill-defined conditions(799.89)     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, on methadone    Thumb pain 8/10/2017    Vitamin D deficiency 8/10/2017     Past Surgical History:   Procedure Laterality Date    HX ORTHOPAEDIC      LEFT ROTATER CUFF REPAIR     HX OTHER SURGICAL      pylonidal cyst    HX OTHER SURGICAL      penial fracture     Allergies   Allergen Reactions    Iodinated Contrast- Oral And Iv Dye Hives     Current Outpatient Prescriptions   Medication Sig Dispense Refill    b-complex with vitamin c tablet Take 1 Tab by mouth daily.  oxymetazoline (AFRIN, OXYMETAZOLINE,) 0.05 % nasal spray 2 Sprays by Both Nostrils route two (2) times a day.  diclofenac EC (VOLTAREN) 75 mg EC tablet Take 75 mg by mouth as needed.  Omeprazole delayed release (PRILOSEC D/R) 20 mg tablet Take 20 mg by mouth nightly.       testosterone cypionate (DEPOTESTOTERONE CYPIONATE) 200 mg/mL injection 75 mg by IntraMUSCular route Once every 2 weeks. LAST DOSE 8/6/18.  MULTIVITAMIN PO Take 1 Tab by mouth daily.  METHADONE HCL (METHADONE PO) Take 60 mg by mouth every morning.  simvastatin (ZOCOR) 10 mg tablet Take 40 mg by mouth nightly. Indications: HYPERCHOLESTEROLEMIA      levothyroxine (LEVOXYL) 100 mcg tablet Take 125 mcg by mouth Daily (before breakfast).  hydrocortisone (CORTEF) 10 mg tablet Take 20 mg by mouth every morning.  gabapentin (NEURONTIN) 100 mg capsule Take 600 mg by mouth two (2) times a day. Back pain       Social History     Social History    Marital status:      Spouse name: N/A    Number of children: 2    Years of education: N/A     Social History Main Topics    Smoking status: Current Every Day Smoker     Packs/day: 1.00     Years: 36.00    Smokeless tobacco: Never Used    Alcohol use No    Drug use: Yes      Comment: METHODONE    Sexual activity: Not Asked     Other Topics Concern    None     Social History Narrative     Family History   Problem Relation Age of Onset    Cancer Mother      breast    High Cholesterol Mother     Stroke Father     Diabetes Father     High Cholesterol Father     Cancer Sister      breast    High Cholesterol Sister     Cancer Sister      breast    High Cholesterol Sister     Cancer Brother      SKIN CANCER    Other Brother      GOUT    High Cholesterol Brother     Heart Attack Paternal Grandmother     Anesth Problems Neg Hx        Reviewed PmHx, RxHx, FmHx, SocHx, AllgHx and updated and dated in the chart.     Review of Systems  Constitutional: negative for fevers, chills, anorexia and weight loss  Eyes:   negative for visual disturbance and irritation  ENT:   negative for tinnitus,sore throat,nasal congestion,ear pain,hoarseness  Respiratory:  negative for cough, hemoptysis, dyspnea,wheezing  CV:   negative for chest pain, palpitations, lower extremity edema  GI:   negative for nausea, vomiting, diarrhea, abdominal pain,melena  Endo:               negative for polyuria,polydipsia,polyphagia,heat intolerance  Genitourinary: negative for frequency, dysuria and hematuria  Integumentary: negative for rash and pruritus  Hematologic:  negative for easy bruising and gum/nose bleeding  Musculoskel: Positive for chronic low back pain with periodic radiation of pain in the right leg  Neurological:  negative for headaches, dizziness, vertigo, memory problems and gait   Behavl/Psych: negative for feelings of anxiety, depression, mood changes      Objective:     Vitals:    08/10/18 1010   BP: 108/60   Pulse: 79   SpO2: 97%   Weight: 191 lb (86.6 kg)   Height: 5' 11\" (1.803 m)     Body mass index is 26.64 kg/(m^2). Physical Examination: General appearance - alert, well appearing, and in no distress and oriented to person, place, and time  Mental status - alert, oriented to person, place, and time, normal mood, behavior, speech, dress, motor activity, and thought processes  Eyes - pupils equal and reactive, extraocular eye movements intact, sclera anicteric, Lids without erythema or swelling.  No discharge eye redness or discharge noted  Ears - bilateral TM's and external ear canals normal, right ear normal, left ear normal  Mouth - mucous membranes moist, pharynx normal without lesions and tongue normal  Neck - supple, no significant adenopathy  Lymphatics - no palpable lymphadenopathy  Chest - clear to auscultation, no wheezes, rales or rhonchi,   Heart - normal rate, regular rhythm, normal S1, S2, no murmurs, rubs, clicks or gallops  Abdomen - soft, nontender, nondistended, no masses or organomegaly  Back exam - full range of motion, no tenderness, palpable spasm or pain on motion  Neurological - alert, oriented, normal speech, no focal findings or movement disorder noted, neck supple without rigidity, cranial nerves II through XII intact, DTR's normal and symmetric, motor and sensory grossly normal bilaterally  Musculoskeletal - no joint tenderness, deformity or swelling  Extremities - peripheral pulses normal, no pedal edema, no clubbing or cyanosis  Skin - normal coloration and turgor, no rashes, no suspicious skin lesions noted  Physical exam otherwise negative    Assessment/ Plan:   Diagnoses and all orders for this visit:    Preoperative clearance    Chronic right-sided low back pain with right-sided sciatica    Acquired hypothyroidism    [place]'s disease ([address]    Uncomplicated opioid dependence ([address]    Low testosterone        Other instructions: The patient's medications are reviewed and reconciled. No change in his current medical regimen is made. Preoperative labs from August 7 are reviewed and are stable and an EKG was done at that time which was within normal limits. The patient is medically stable, at low cardiac risk and cleared for the surgery as scheduled pending recommendations from Dr. Tamir Ramirez regarding his perioperative steroid management due to [place]'s disease. The patient will also need aggressive opiate management perioperative due to his methadone usage which has been chronic. Follow-up here otherwise as previously scheduled    Follow-up Disposition:  Return for As previously scheduled. I have discussed the diagnosis with the patient and the intended plan as seen in the above orders. The patient has received an after-visit summary and questions were answered concerning future plans. Pt conveyed understanding of plan.     Amanda Bowles MD

## 2018-08-16 NOTE — H&P
Cindy Carlton  Location: [place] [address] [address]  Patient #: [id number]  : 1965   / Language: English / Race: White  Male      History of Present Illness   The patient is a 47year old male who presents with chronic lumbar back pain. Symptoms include pain, stiffness and decreased range of motion. Symptoms are located in the low back. The patient describes the pain as sharp and burning (and stabbing). Onset was gradual. The symptoms occur constantly. The patient describes symptoms as moderate in severity (moderate to severe) and worsening. Symptoms are exacerbated by weight bearing, back motion, standing, sitting, lifting, bending and straining. Symptoms are relieved by rest. Current treatment includes muscle relaxants and tricyclic chronic pain medications. Recently the disease has been worsening. The patient was previously evaluated by a colleague. Past evaluation has included x-ray of the lumbar spine and MRI of the lumbar spine. Past treatment has included epidural injection. Additional reasons for visit:    Transition into care is described as the following: The patient is transitioning into care from another physician. Allergies   Iodinated Contrast      Family History   Arthritis   Father. Breast cancer   Mother, Sister. Cancer   Brother. Cerebrovascular Accident   Father. Diabetes Mellitus   Father. Hypercholesterolemia   Brother, Father. Social History  Alcohol use   2 times, 3-5 drinks per occasion, Drinks beer, Drinks hard liquor, Never drinks more than 5 drinks per occasion, per year. Caffeine use   5-6 drinks per day, Carbonated beverages, Coffee, Tea. Current work status   Full-time. Exercise   7 or more times per week, walking. Illicit drug use   Previous. Marital status   . Seat Belt Use   Always uses seat belts. Sun Exposure   Frequently. Tobacco / smoke exposure   None.   Tobacco use   Current every day smoker, Smokes 1 pack of cigarettes per day. Medication History  Levothyroxine Sodium  (Oral) Specific strength unknown - Active. Hydrocortisone  (External) Specific strength unknown - Active. Multivitamin Adult  (Oral) Specific strength unknown - Active. Simvastatin  (Oral) Specific strength unknown - Active. Neurontin  (Oral) Specific strength unknown - Active. Omeprazole  (Oral) Specific strength unknown - Active. Metaxalone  (Oral) Specific strength unknown - Active. Medications Reconciled     Past Surgical History  Arthroscopy of Shoulder   bilateral  Hand Surgery For Arthritis    Rotator Cuff Surgery   left    Diagnostic Studies History   MRI, Spine Results: Severe degenerative changes noted at L2-3; Severe spondylosis is noted. Foraminal stenosis is noted. Other Problems   Arthritis    Diabetes Mellitus    Gastroesophageal Reflux Disease    Hypercholesterolemia    Kidney Disease    Thyroid Disease        Review of Systems  General Present- Fatigue, Night Sweats and Seasonal Allergies. Not Present- Appetite Loss, Chills, Fever, HIV Exposure, Persistent Infections, Weight Gain and Weight Loss. Skin Present- Poor Wound Healing and Skin Color Changes. Not Present- Itching, Nail Changes, Rash, Suspicious Lesions and Yellowish Skin Color. HEENT Present- Decreased Hearing, Hoarseness and Sore Throat. Not Present- Earache, Loose Teeth, Nose Bleed and Ringing in the Ears. Respiratory Present- Snoring and Wheezing. Not Present- Bloody sputum, Chronic Cough, Difficulty Breathing and Wakes up from Sleep Wheezing or Short of Breath. Cardiovascular Present- Difficulty Breathing On Exertion. Not Present- Bluish Discoloration Of Lips Or Nails, Chest Pain, Difficulty Breathing Lying Down, Leg Cramps With Exertion, Palpitations and Swelling of Extremities. Male Genitourinary Present- Trouble Starting Urinary Stream. Not Present- Blood in Urine, Frequency, Painful Urination, Pelvic Pain and Urgency.   Musculoskeletal Present- Back Pain, Joint Pain and Joint Stiffness. Not Present- Joint Swelling. Neurological Present- Memory Loss, Numbness and Weakness. Not Present- Fainting, Headaches, Seizures, Tingling, Tremor and Unsteadiness. Psychiatric Present- Anxiety. Not Present- Bipolar and Depression. Endocrine Present- Excessive Thirst and Heat Intolerance. Not Present- Cold Intolerance, Excessive Hunger and Excessive Urination. Hematology Present- Skin Discoloration. Not Present- Abnormal Bruising , Enlarged Lymph Nodes and Excessive bleeding. Vitals  Weight: 188 lb   Height: 71 in   Body Surface Area: 2.05 m²   Body Mass Index: 26.22 kg/m²        Physical Exam  Neurologic  Sensory  Light Touch - Intact - Globally. Overall Assessment of Muscle Strength and Tone reveals  Lower Extremities - Right Iliopsoas - 4/5. Left Iliopsoas - 4/5. Right Tibialis Anterior - 5/5. Left Tibialis Anterior - 5/5. Right Gastroc-Soleus - 5/5. Left Gastroc-Soleus - 5/5. Right EHL - 5/5. Left EHL - 5/5. General Assessment of Reflexes  Right Ankle - Clonus is not present. Left Ankle - Clonus is not present. Reflexes (Dermatomes)  2/2 Normal - Left Achilles (L5-S2), Left Knee (L2-4), Right Achilles (L5-S2) and Right Knee (L2-4). Musculoskeletal  Global Assessment  Examination of related systems reveals - well-developed, well-nourished, in no acute distress, alert and oriented x 3. Gait and Station - normal gait and station and normal posture. Right Lower Extremity - normal strength and tone, normal range of motion without pain and no instability, subluxation or laxity. Left Lower Extremity - normal strength and tone, normal range of motion without pain and no instability, subluxation or laxity. Spine/Ribs/Pelvis  Cervical Spine - Examination of the cervical spine reveals - no tenderness to palpation, no pain, no swelling, edema or erythema, normal cervical spine movements and normal sensation.  Thoracic (Dorsal) Spine - Examination of the thoracic spine reveals - no tenderness over thoracic vertebrae, no pain, normal sensation and normal thoracic spine movements. Lumbosacral Spine - Examination of the lumbosacral spine reveals - no known fractures or deformities. Inspection and Palpation - Tenderness - moderate. Assessment of pain reveals the following findings - The pain is characterized as - severe, deep and pain accumulates with activity. Location - pain refers to lower back bilaterally. ROJM - Trunk Extension - 15 degrees. Lumbar Spine Flexion - . Lumbosacral Spine - Functional Testing - Babinski Test negative, Prone Knee Bending Test negative, Slump Test negative, Straight Leg Raising Test negative. Assessment & Plan   Disc degeneration of lumbar region (722.52  M51.36)  Impression: The patient has had a chronic history of back pain and bilateral leg and thigh pain. He has severe spondylosis at L2-3 with severe foraminal stenosis due to a up and down disc space collapse. He has failed over 6 months to a year of conservative treatment including multiple injections and physical therapy. He is on chronic pain medications. He is a candidate this time for a lateral interbody fusion at L2-3 for restoration of disc space height as well as resolution of the spondylosis. Risks and benefits were discussed with him. The risks and benefits were discussed at length with the patient and the patient has elected to proceed. Indications for surgery include failed conservative treatment. Alternative treatments, risks and the perioperative course were discussed with the patient. All questions were answered. The risks and benefits of the procedure were explained. Benefits include definitive diagnosis, relief of pain, elimination of deformity and improved function. Risks of surgery including bleeding, infection, weakness, numbness, CSF leak, failure to improve symptoms, exacerbation of medical co-morbidities and even death were discussed with the patient.   Current Plans  Pt Education - How to access health information online: discussed with patient and provided information. Pt Education - Educational materials were provided.: discussed with patient and provided information.   Lumbar stenosis with neurogenic claudication (724.03  M48.062)  Treatment options were discussed with the patient in full.(V65.49)  Current Plans  Presurgical planning was preformed with the patient today  Surgery to be scheduled  PRocedure:  L2-3 OLIF with anterolateral plating (Navigation)        Signed by Steve Yanes MD

## 2018-08-17 ENCOUNTER — ANESTHESIA EVENT (OUTPATIENT)
Dept: SURGERY | Age: 53
DRG: 460 | End: 2018-08-17
Payer: COMMERCIAL

## 2018-08-20 ENCOUNTER — HOSPITAL ENCOUNTER (INPATIENT)
Age: 53
LOS: 2 days | Discharge: HOME | DRG: 460 | End: 2018-08-22
Attending: ORTHOPAEDIC SURGERY | Admitting: ORTHOPAEDIC SURGERY
Payer: COMMERCIAL

## 2018-08-20 ENCOUNTER — APPOINTMENT (OUTPATIENT)
Dept: GENERAL RADIOLOGY | Age: 53
DRG: 460 | End: 2018-08-20
Attending: ORTHOPAEDIC SURGERY
Payer: COMMERCIAL

## 2018-08-20 ENCOUNTER — ANESTHESIA (OUTPATIENT)
Dept: SURGERY | Age: 53
DRG: 460 | End: 2018-08-20
Payer: COMMERCIAL

## 2018-08-20 DIAGNOSIS — M48.061 SPINAL STENOSIS OF LUMBAR REGION, UNSPECIFIED WHETHER NEUROGENIC CLAUDICATION PRESENT: Primary | ICD-10-CM

## 2018-08-20 LAB
GLUCOSE BLD STRIP.AUTO-MCNC: 107 MG/DL (ref 65–100)
SERVICE CMNT-IMP: ABNORMAL

## 2018-08-20 PROCEDURE — 0ST20ZZ RESECTION OF LUMBAR VERTEBRAL DISC, OPEN APPROACH: ICD-10-PCS | Performed by: ORTHOPAEDIC SURGERY

## 2018-08-20 PROCEDURE — 77030039267 HC ADH SKN EXOFIN S2SG -B: Performed by: ORTHOPAEDIC SURGERY

## 2018-08-20 PROCEDURE — 77030029099 HC BN WAX SSPC -A: Performed by: ORTHOPAEDIC SURGERY

## 2018-08-20 PROCEDURE — 77030008467 HC STPLR SKN COVD -B: Performed by: ORTHOPAEDIC SURGERY

## 2018-08-20 PROCEDURE — 74011250636 HC RX REV CODE- 250/636: Performed by: PHYSICIAN ASSISTANT

## 2018-08-20 PROCEDURE — 77030039536 HC SPCR SPN CLYDESDALE MEDT -I3: Performed by: ORTHOPAEDIC SURGERY

## 2018-08-20 PROCEDURE — 74011250636 HC RX REV CODE- 250/636

## 2018-08-20 PROCEDURE — 74011000250 HC RX REV CODE- 250: Performed by: ORTHOPAEDIC SURGERY

## 2018-08-20 PROCEDURE — 74011000250 HC RX REV CODE- 250

## 2018-08-20 PROCEDURE — 77030020268 HC MISC GENERAL SUPPLY: Performed by: ORTHOPAEDIC SURGERY

## 2018-08-20 PROCEDURE — 77030020782 HC GWN BAIR PAWS FLX 3M -B

## 2018-08-20 PROCEDURE — 76060000037 HC ANESTHESIA 3 TO 3.5 HR: Performed by: ORTHOPAEDIC SURGERY

## 2018-08-20 PROCEDURE — 77030037713 HC CLOSR DEV INCIS ZIP STRY -B: Performed by: ORTHOPAEDIC SURGERY

## 2018-08-20 PROCEDURE — C1713 ANCHOR/SCREW BN/BN,TIS/BN: HCPCS | Performed by: ORTHOPAEDIC SURGERY

## 2018-08-20 PROCEDURE — 77030028539 HC KNF DSCTMY MTRX BYN MEDT -D: Performed by: ORTHOPAEDIC SURGERY

## 2018-08-20 PROCEDURE — 82962 GLUCOSE BLOOD TEST: CPT

## 2018-08-20 PROCEDURE — 72100 X-RAY EXAM L-S SPINE 2/3 VWS: CPT

## 2018-08-20 PROCEDURE — 77030011264 HC ELECTRD BLD EXT COVD -A: Performed by: ORTHOPAEDIC SURGERY

## 2018-08-20 PROCEDURE — 76210000017 HC OR PH I REC 1.5 TO 2 HR: Performed by: ORTHOPAEDIC SURGERY

## 2018-08-20 PROCEDURE — 76010000172 HC OR TIME 2.5 TO 3 HR INTENSV-TIER 1: Performed by: ORTHOPAEDIC SURGERY

## 2018-08-20 PROCEDURE — 76000 FLUOROSCOPY <1 HR PHYS/QHP: CPT

## 2018-08-20 PROCEDURE — 77030034850: Performed by: ORTHOPAEDIC SURGERY

## 2018-08-20 PROCEDURE — 77030032490 HC SLV COMPR SCD KNE COVD -B: Performed by: ORTHOPAEDIC SURGERY

## 2018-08-20 PROCEDURE — 74011250636 HC RX REV CODE- 250/636: Performed by: ANESTHESIOLOGY

## 2018-08-20 PROCEDURE — 77030013079 HC BLNKT BAIR HGGR 3M -A: Performed by: ANESTHESIOLOGY

## 2018-08-20 PROCEDURE — 65270000029 HC RM PRIVATE

## 2018-08-20 PROCEDURE — 77030026438 HC STYL ET INTUB CARD -A: Performed by: ANESTHESIOLOGY

## 2018-08-20 PROCEDURE — 77030012893

## 2018-08-20 PROCEDURE — 0SG00A0 FUSION OF LUMBAR VERTEBRAL JOINT WITH INTERBODY FUSION DEVICE, ANTERIOR APPROACH, ANTERIOR COLUMN, OPEN APPROACH: ICD-10-PCS | Performed by: ORTHOPAEDIC SURGERY

## 2018-08-20 PROCEDURE — 77030034094 HC GRFT BN SUB ELITE TRNTY MUSC -I1: Performed by: ORTHOPAEDIC SURGERY

## 2018-08-20 PROCEDURE — 77030008684 HC TU ET CUF COVD -B: Performed by: ANESTHESIOLOGY

## 2018-08-20 PROCEDURE — 74011250637 HC RX REV CODE- 250/637: Performed by: PHYSICIAN ASSISTANT

## 2018-08-20 PROCEDURE — 74011000258 HC RX REV CODE- 258: Performed by: PHYSICIAN ASSISTANT

## 2018-08-20 PROCEDURE — 74011000272 HC RX REV CODE- 272: Performed by: ORTHOPAEDIC SURGERY

## 2018-08-20 PROCEDURE — 77030030943 HC ST DIL NIMS STIM MEDT -G: Performed by: ORTHOPAEDIC SURGERY

## 2018-08-20 PROCEDURE — 77030038020 HC MANFLD NEPTUNE STRY -B: Performed by: ORTHOPAEDIC SURGERY

## 2018-08-20 DEVICE — CAGE 4986855 CDALE PTC 18MM 6 DEG 8X55
Type: IMPLANTABLE DEVICE | Site: SPINE LUMBAR | Status: FUNCTIONAL
Brand: CLYDESDALE PTC™ SPINAL SYSTEM

## 2018-08-20 DEVICE — GRAFT BNE SUB M CANC FRZN MORSELIZED W/ VIABLE CELL TRINITY: Type: IMPLANTABLE DEVICE | Site: SPINE LUMBAR | Status: FUNCTIONAL

## 2018-08-20 DEVICE — PLATE 2140001 OLIF25 2-HOLE PLATE SMALL
Type: IMPLANTABLE DEVICE | Site: SPINE LUMBAR | Status: FUNCTIONAL
Brand: PIVOX™ OBLIQUE LATERAL SPINAL SYSTEM

## 2018-08-20 RX ORDER — CYCLOBENZAPRINE HCL 10 MG
10 TABLET ORAL
Status: DISCONTINUED | OUTPATIENT
Start: 2018-08-20 | End: 2018-08-22 | Stop reason: HOSPADM

## 2018-08-20 RX ORDER — PANTOPRAZOLE SODIUM 40 MG/1
40 TABLET, DELAYED RELEASE ORAL
Status: DISCONTINUED | OUTPATIENT
Start: 2018-08-20 | End: 2018-08-22 | Stop reason: HOSPADM

## 2018-08-20 RX ORDER — GABAPENTIN 300 MG/1
600 CAPSULE ORAL 2 TIMES DAILY
Status: DISCONTINUED | OUTPATIENT
Start: 2018-08-20 | End: 2018-08-22 | Stop reason: HOSPADM

## 2018-08-20 RX ORDER — ONDANSETRON 2 MG/ML
INJECTION INTRAMUSCULAR; INTRAVENOUS AS NEEDED
Status: DISCONTINUED | OUTPATIENT
Start: 2018-08-20 | End: 2018-08-20 | Stop reason: HOSPADM

## 2018-08-20 RX ORDER — HYDROMORPHONE HYDROCHLORIDE 1 MG/ML
INJECTION, SOLUTION INTRAMUSCULAR; INTRAVENOUS; SUBCUTANEOUS
Status: DISPENSED
Start: 2018-08-20 | End: 2018-08-21

## 2018-08-20 RX ORDER — ROCURONIUM BROMIDE 10 MG/ML
INJECTION, SOLUTION INTRAVENOUS AS NEEDED
Status: DISCONTINUED | OUTPATIENT
Start: 2018-08-20 | End: 2018-08-20 | Stop reason: HOSPADM

## 2018-08-20 RX ORDER — FENTANYL CITRATE 50 UG/ML
INJECTION, SOLUTION INTRAMUSCULAR; INTRAVENOUS
Status: COMPLETED
Start: 2018-08-20 | End: 2018-08-20

## 2018-08-20 RX ORDER — MIDAZOLAM HYDROCHLORIDE 1 MG/ML
1 INJECTION, SOLUTION INTRAMUSCULAR; INTRAVENOUS AS NEEDED
Status: DISCONTINUED | OUTPATIENT
Start: 2018-08-20 | End: 2018-08-20 | Stop reason: HOSPADM

## 2018-08-20 RX ORDER — MORPHINE SULFATE 10 MG/ML
2 INJECTION, SOLUTION INTRAMUSCULAR; INTRAVENOUS
Status: DISCONTINUED | OUTPATIENT
Start: 2018-08-20 | End: 2018-08-20 | Stop reason: HOSPADM

## 2018-08-20 RX ORDER — HYDROCORTISONE SODIUM SUCCINATE 100 MG/2ML
40 INJECTION, POWDER, FOR SOLUTION INTRAMUSCULAR; INTRAVENOUS ONCE
Status: COMPLETED | OUTPATIENT
Start: 2018-08-20 | End: 2018-08-20

## 2018-08-20 RX ORDER — HYDROCORTISONE 10 MG/1
20 TABLET ORAL
Status: DISCONTINUED | OUTPATIENT
Start: 2018-08-21 | End: 2018-08-22 | Stop reason: HOSPADM

## 2018-08-20 RX ORDER — FENTANYL CITRATE 50 UG/ML
25 INJECTION, SOLUTION INTRAMUSCULAR; INTRAVENOUS
Status: DISCONTINUED | OUTPATIENT
Start: 2018-08-20 | End: 2018-08-22 | Stop reason: HOSPADM

## 2018-08-20 RX ORDER — OXYCODONE HYDROCHLORIDE 5 MG/1
10 TABLET ORAL
Status: DISCONTINUED | OUTPATIENT
Start: 2018-08-20 | End: 2018-08-22 | Stop reason: HOSPADM

## 2018-08-20 RX ORDER — MIDAZOLAM HYDROCHLORIDE 1 MG/ML
INJECTION, SOLUTION INTRAMUSCULAR; INTRAVENOUS AS NEEDED
Status: DISCONTINUED | OUTPATIENT
Start: 2018-08-20 | End: 2018-08-20 | Stop reason: HOSPADM

## 2018-08-20 RX ORDER — SODIUM CHLORIDE, SODIUM LACTATE, POTASSIUM CHLORIDE, CALCIUM CHLORIDE 600; 310; 30; 20 MG/100ML; MG/100ML; MG/100ML; MG/100ML
125 INJECTION, SOLUTION INTRAVENOUS CONTINUOUS
Status: DISCONTINUED | OUTPATIENT
Start: 2018-08-20 | End: 2018-08-20 | Stop reason: HOSPADM

## 2018-08-20 RX ORDER — PROPOFOL 10 MG/ML
INJECTION, EMULSION INTRAVENOUS
Status: DISCONTINUED | OUTPATIENT
Start: 2018-08-20 | End: 2018-08-20 | Stop reason: HOSPADM

## 2018-08-20 RX ORDER — SUCCINYLCHOLINE CHLORIDE 20 MG/ML
INJECTION INTRAMUSCULAR; INTRAVENOUS AS NEEDED
Status: DISCONTINUED | OUTPATIENT
Start: 2018-08-20 | End: 2018-08-20 | Stop reason: HOSPADM

## 2018-08-20 RX ORDER — LORAZEPAM 2 MG/ML
1 INJECTION INTRAMUSCULAR
Status: COMPLETED | OUTPATIENT
Start: 2018-08-20 | End: 2018-08-20

## 2018-08-20 RX ORDER — FENTANYL CITRATE 50 UG/ML
25 INJECTION, SOLUTION INTRAMUSCULAR; INTRAVENOUS
Status: DISCONTINUED | OUTPATIENT
Start: 2018-08-20 | End: 2018-08-20 | Stop reason: HOSPADM

## 2018-08-20 RX ORDER — DIPHENHYDRAMINE HYDROCHLORIDE 50 MG/ML
12.5 INJECTION, SOLUTION INTRAMUSCULAR; INTRAVENOUS
Status: ACTIVE | OUTPATIENT
Start: 2018-08-20 | End: 2018-08-21

## 2018-08-20 RX ORDER — SODIUM CHLORIDE 0.9 % (FLUSH) 0.9 %
5-10 SYRINGE (ML) INJECTION AS NEEDED
Status: DISCONTINUED | OUTPATIENT
Start: 2018-08-20 | End: 2018-08-20 | Stop reason: HOSPADM

## 2018-08-20 RX ORDER — DEXMEDETOMIDINE HYDROCHLORIDE 4 UG/ML
INJECTION, SOLUTION INTRAVENOUS
Status: DISCONTINUED | OUTPATIENT
Start: 2018-08-20 | End: 2018-08-20 | Stop reason: HOSPADM

## 2018-08-20 RX ORDER — OXYCODONE HYDROCHLORIDE 5 MG/1
5 TABLET ORAL
Status: DISCONTINUED | OUTPATIENT
Start: 2018-08-20 | End: 2018-08-22 | Stop reason: HOSPADM

## 2018-08-20 RX ORDER — ONDANSETRON 2 MG/ML
4 INJECTION INTRAMUSCULAR; INTRAVENOUS
Status: ACTIVE | OUTPATIENT
Start: 2018-08-20 | End: 2018-08-21

## 2018-08-20 RX ORDER — CEFAZOLIN SODIUM/WATER 2 G/20 ML
2 SYRINGE (ML) INTRAVENOUS ONCE
Status: COMPLETED | OUTPATIENT
Start: 2018-08-20 | End: 2018-08-20

## 2018-08-20 RX ORDER — DIPHENHYDRAMINE HYDROCHLORIDE 50 MG/ML
12.5 INJECTION, SOLUTION INTRAMUSCULAR; INTRAVENOUS AS NEEDED
Status: DISCONTINUED | OUTPATIENT
Start: 2018-08-20 | End: 2018-08-20 | Stop reason: HOSPADM

## 2018-08-20 RX ORDER — PROPOFOL 10 MG/ML
INJECTION, EMULSION INTRAVENOUS AS NEEDED
Status: DISCONTINUED | OUTPATIENT
Start: 2018-08-20 | End: 2018-08-20 | Stop reason: HOSPADM

## 2018-08-20 RX ORDER — PHENOL/SODIUM PHENOLATE
20 AEROSOL, SPRAY (ML) MUCOUS MEMBRANE
Status: DISCONTINUED | OUTPATIENT
Start: 2018-08-20 | End: 2018-08-20 | Stop reason: CLARIF

## 2018-08-20 RX ORDER — AMOXICILLIN 250 MG
1 CAPSULE ORAL 2 TIMES DAILY
Status: DISCONTINUED | OUTPATIENT
Start: 2018-08-20 | End: 2018-08-22 | Stop reason: HOSPADM

## 2018-08-20 RX ORDER — CEFAZOLIN SODIUM/WATER 2 G/20 ML
2 SYRINGE (ML) INTRAVENOUS EVERY 8 HOURS
Status: COMPLETED | OUTPATIENT
Start: 2018-08-21 | End: 2018-08-21

## 2018-08-20 RX ORDER — SODIUM CHLORIDE 0.9 % (FLUSH) 0.9 %
5-10 SYRINGE (ML) INJECTION EVERY 8 HOURS
Status: DISCONTINUED | OUTPATIENT
Start: 2018-08-20 | End: 2018-08-20 | Stop reason: HOSPADM

## 2018-08-20 RX ORDER — OXYCODONE HYDROCHLORIDE 5 MG/1
5 TABLET ORAL AS NEEDED
Status: DISCONTINUED | OUTPATIENT
Start: 2018-08-20 | End: 2018-08-20 | Stop reason: HOSPADM

## 2018-08-20 RX ORDER — SODIUM CHLORIDE 9 MG/ML
125 INJECTION, SOLUTION INTRAVENOUS CONTINUOUS
Status: DISPENSED | OUTPATIENT
Start: 2018-08-20 | End: 2018-08-21

## 2018-08-20 RX ORDER — FENTANYL CITRATE 50 UG/ML
INJECTION, SOLUTION INTRAMUSCULAR; INTRAVENOUS AS NEEDED
Status: DISCONTINUED | OUTPATIENT
Start: 2018-08-20 | End: 2018-08-20 | Stop reason: HOSPADM

## 2018-08-20 RX ORDER — HYDROMORPHONE HYDROCHLORIDE 2 MG/ML
0.5 INJECTION, SOLUTION INTRAMUSCULAR; INTRAVENOUS; SUBCUTANEOUS
Status: DISCONTINUED | OUTPATIENT
Start: 2018-08-20 | End: 2018-08-20 | Stop reason: HOSPADM

## 2018-08-20 RX ORDER — EPHEDRINE SULFATE 50 MG/ML
INJECTION, SOLUTION INTRAVENOUS AS NEEDED
Status: DISCONTINUED | OUTPATIENT
Start: 2018-08-20 | End: 2018-08-20 | Stop reason: HOSPADM

## 2018-08-20 RX ORDER — MIDAZOLAM HYDROCHLORIDE 1 MG/ML
INJECTION, SOLUTION INTRAMUSCULAR; INTRAVENOUS
Status: COMPLETED
Start: 2018-08-20 | End: 2018-08-20

## 2018-08-20 RX ORDER — FACIAL-BODY WIPES
10 EACH TOPICAL DAILY PRN
Status: DISCONTINUED | OUTPATIENT
Start: 2018-08-22 | End: 2018-08-22 | Stop reason: HOSPADM

## 2018-08-20 RX ORDER — KETAMINE HYDROCHLORIDE 10 MG/ML
INJECTION, SOLUTION INTRAMUSCULAR; INTRAVENOUS AS NEEDED
Status: DISCONTINUED | OUTPATIENT
Start: 2018-08-20 | End: 2018-08-20 | Stop reason: HOSPADM

## 2018-08-20 RX ORDER — METHADONE HYDROCHLORIDE 10 MG/1
60 TABLET ORAL
Status: DISCONTINUED | OUTPATIENT
Start: 2018-08-21 | End: 2018-08-22 | Stop reason: HOSPADM

## 2018-08-20 RX ORDER — POLYETHYLENE GLYCOL 3350 17 G/17G
17 POWDER, FOR SOLUTION ORAL DAILY
Status: DISCONTINUED | OUTPATIENT
Start: 2018-08-21 | End: 2018-08-22 | Stop reason: HOSPADM

## 2018-08-20 RX ORDER — LIDOCAINE HYDROCHLORIDE 10 MG/ML
0.5 INJECTION, SOLUTION EPIDURAL; INFILTRATION; INTRACAUDAL; PERINEURAL AS NEEDED
Status: DISCONTINUED | OUTPATIENT
Start: 2018-08-20 | End: 2018-08-20 | Stop reason: HOSPADM

## 2018-08-20 RX ORDER — THERA TABS 400 MCG
1 TAB ORAL DAILY
Status: DISCONTINUED | OUTPATIENT
Start: 2018-08-21 | End: 2018-08-22 | Stop reason: HOSPADM

## 2018-08-20 RX ORDER — ACETAMINOPHEN 500 MG
1000 TABLET ORAL EVERY 6 HOURS
Status: DISCONTINUED | OUTPATIENT
Start: 2018-08-21 | End: 2018-08-22 | Stop reason: HOSPADM

## 2018-08-20 RX ORDER — LEVOTHYROXINE SODIUM 125 UG/1
125 TABLET ORAL
Status: DISCONTINUED | OUTPATIENT
Start: 2018-08-21 | End: 2018-08-22 | Stop reason: HOSPADM

## 2018-08-20 RX ORDER — LORAZEPAM 2 MG/ML
INJECTION INTRAMUSCULAR
Status: COMPLETED
Start: 2018-08-20 | End: 2018-08-20

## 2018-08-20 RX ORDER — MIDAZOLAM HYDROCHLORIDE 1 MG/ML
1 INJECTION, SOLUTION INTRAMUSCULAR; INTRAVENOUS
Status: DISCONTINUED | OUTPATIENT
Start: 2018-08-20 | End: 2018-08-20 | Stop reason: HOSPADM

## 2018-08-20 RX ORDER — ONDANSETRON 2 MG/ML
4 INJECTION INTRAMUSCULAR; INTRAVENOUS AS NEEDED
Status: DISCONTINUED | OUTPATIENT
Start: 2018-08-20 | End: 2018-08-20 | Stop reason: HOSPADM

## 2018-08-20 RX ORDER — SODIUM CHLORIDE, SODIUM LACTATE, POTASSIUM CHLORIDE, CALCIUM CHLORIDE 600; 310; 30; 20 MG/100ML; MG/100ML; MG/100ML; MG/100ML
INJECTION, SOLUTION INTRAVENOUS
Status: DISCONTINUED | OUTPATIENT
Start: 2018-08-20 | End: 2018-08-20 | Stop reason: HOSPADM

## 2018-08-20 RX ORDER — HYDROMORPHONE HYDROCHLORIDE 1 MG/ML
INJECTION, SOLUTION INTRAMUSCULAR; INTRAVENOUS; SUBCUTANEOUS AS NEEDED
Status: DISCONTINUED | OUTPATIENT
Start: 2018-08-20 | End: 2018-08-20 | Stop reason: HOSPADM

## 2018-08-20 RX ORDER — SODIUM CHLORIDE 0.9 % (FLUSH) 0.9 %
5-10 SYRINGE (ML) INJECTION EVERY 8 HOURS
Status: DISCONTINUED | OUTPATIENT
Start: 2018-08-21 | End: 2018-08-22 | Stop reason: HOSPADM

## 2018-08-20 RX ORDER — METHADONE HYDROCHLORIDE 10 MG/ML
20 INJECTION, SOLUTION INTRAMUSCULAR; INTRAVENOUS; SUBCUTANEOUS ONCE
Status: ACTIVE | OUTPATIENT
Start: 2018-08-20 | End: 2018-08-21

## 2018-08-20 RX ORDER — DEXTROSE, SODIUM CHLORIDE, SODIUM LACTATE, POTASSIUM CHLORIDE, AND CALCIUM CHLORIDE 5; .6; .31; .03; .02 G/100ML; G/100ML; G/100ML; G/100ML; G/100ML
125 INJECTION, SOLUTION INTRAVENOUS CONTINUOUS
Status: DISCONTINUED | OUTPATIENT
Start: 2018-08-20 | End: 2018-08-20 | Stop reason: HOSPADM

## 2018-08-20 RX ORDER — FENTANYL CITRATE 50 UG/ML
50 INJECTION, SOLUTION INTRAMUSCULAR; INTRAVENOUS AS NEEDED
Status: DISCONTINUED | OUTPATIENT
Start: 2018-08-20 | End: 2018-08-20 | Stop reason: HOSPADM

## 2018-08-20 RX ORDER — SODIUM CHLORIDE 0.9 % (FLUSH) 0.9 %
5-10 SYRINGE (ML) INJECTION AS NEEDED
Status: DISCONTINUED | OUTPATIENT
Start: 2018-08-20 | End: 2018-08-22 | Stop reason: HOSPADM

## 2018-08-20 RX ORDER — SIMVASTATIN 40 MG/1
40 TABLET, FILM COATED ORAL
Status: DISCONTINUED | OUTPATIENT
Start: 2018-08-20 | End: 2018-08-22 | Stop reason: HOSPADM

## 2018-08-20 RX ORDER — NALOXONE HYDROCHLORIDE 0.4 MG/ML
0.4 INJECTION, SOLUTION INTRAMUSCULAR; INTRAVENOUS; SUBCUTANEOUS AS NEEDED
Status: DISCONTINUED | OUTPATIENT
Start: 2018-08-20 | End: 2018-08-22 | Stop reason: HOSPADM

## 2018-08-20 RX ADMIN — FENTANYL CITRATE 100 MCG: 50 INJECTION, SOLUTION INTRAMUSCULAR; INTRAVENOUS at 16:00

## 2018-08-20 RX ADMIN — SODIUM CHLORIDE 20 MG: 900 INJECTION, SOLUTION INTRAVENOUS at 22:32

## 2018-08-20 RX ADMIN — Medication 2 G: at 23:43

## 2018-08-20 RX ADMIN — DOCUSATE SODIUM AND SENNOSIDES 1 TABLET: 8.6; 5 TABLET, FILM COATED ORAL at 21:51

## 2018-08-20 RX ADMIN — SODIUM CHLORIDE 125 ML/HR: 900 INJECTION, SOLUTION INTRAVENOUS at 20:30

## 2018-08-20 RX ADMIN — FENTANYL CITRATE 25 MCG: 50 INJECTION, SOLUTION INTRAMUSCULAR; INTRAVENOUS at 20:14

## 2018-08-20 RX ADMIN — EPHEDRINE SULFATE 10 MG: 50 INJECTION, SOLUTION INTRAVENOUS at 16:49

## 2018-08-20 RX ADMIN — SODIUM CHLORIDE, SODIUM LACTATE, POTASSIUM CHLORIDE, AND CALCIUM CHLORIDE 125 ML/HR: 600; 310; 30; 20 INJECTION, SOLUTION INTRAVENOUS at 15:26

## 2018-08-20 RX ADMIN — SODIUM CHLORIDE, SODIUM LACTATE, POTASSIUM CHLORIDE, CALCIUM CHLORIDE: 600; 310; 30; 20 INJECTION, SOLUTION INTRAVENOUS at 17:15

## 2018-08-20 RX ADMIN — MIDAZOLAM 2 MG: 1 INJECTION INTRAMUSCULAR; INTRAVENOUS at 19:00

## 2018-08-20 RX ADMIN — HYDROMORPHONE HYDROCHLORIDE 1 MG: 2 INJECTION, SOLUTION INTRAMUSCULAR; INTRAVENOUS; SUBCUTANEOUS at 19:30

## 2018-08-20 RX ADMIN — ACETAMINOPHEN 1000 MG: 500 TABLET ORAL at 23:43

## 2018-08-20 RX ADMIN — HYDROCORTISONE SODIUM SUCCINATE 100 MG: 100 INJECTION, POWDER, FOR SOLUTION INTRAMUSCULAR; INTRAVENOUS at 16:06

## 2018-08-20 RX ADMIN — FENTANYL CITRATE 25 MCG: 50 INJECTION, SOLUTION INTRAMUSCULAR; INTRAVENOUS at 20:07

## 2018-08-20 RX ADMIN — LORAZEPAM 1 MG: 2 INJECTION INTRAMUSCULAR; INTRAVENOUS at 19:45

## 2018-08-20 RX ADMIN — SUCCINYLCHOLINE CHLORIDE 60 MG: 20 INJECTION INTRAMUSCULAR; INTRAVENOUS at 16:15

## 2018-08-20 RX ADMIN — SUCCINYLCHOLINE CHLORIDE 140 MG: 20 INJECTION INTRAMUSCULAR; INTRAVENOUS at 16:00

## 2018-08-20 RX ADMIN — KETAMINE HYDROCHLORIDE 10 MG: 10 INJECTION, SOLUTION INTRAMUSCULAR; INTRAVENOUS at 17:53

## 2018-08-20 RX ADMIN — SODIUM CHLORIDE, SODIUM LACTATE, POTASSIUM CHLORIDE, CALCIUM CHLORIDE: 600; 310; 30; 20 INJECTION, SOLUTION INTRAVENOUS at 15:50

## 2018-08-20 RX ADMIN — EPHEDRINE SULFATE 10 MG: 50 INJECTION, SOLUTION INTRAVENOUS at 16:23

## 2018-08-20 RX ADMIN — PROPOFOL 50 MG: 10 INJECTION, EMULSION INTRAVENOUS at 16:15

## 2018-08-20 RX ADMIN — HYDROMORPHONE HYDROCHLORIDE 0.5 MG: 1 INJECTION, SOLUTION INTRAMUSCULAR; INTRAVENOUS; SUBCUTANEOUS at 15:50

## 2018-08-20 RX ADMIN — LORAZEPAM 1 MG: 2 INJECTION INTRAMUSCULAR; INTRAVENOUS at 20:00

## 2018-08-20 RX ADMIN — SIMVASTATIN 40 MG: 40 TABLET, FILM COATED ORAL at 21:52

## 2018-08-20 RX ADMIN — KETAMINE HYDROCHLORIDE 40 MG: 10 INJECTION, SOLUTION INTRAMUSCULAR; INTRAVENOUS at 16:00

## 2018-08-20 RX ADMIN — MIDAZOLAM HYDROCHLORIDE 2 MG: 1 INJECTION, SOLUTION INTRAMUSCULAR; INTRAVENOUS at 19:00

## 2018-08-20 RX ADMIN — ROCURONIUM BROMIDE 5 MG: 10 INJECTION, SOLUTION INTRAVENOUS at 16:00

## 2018-08-20 RX ADMIN — ONDANSETRON 4 MG: 2 INJECTION INTRAMUSCULAR; INTRAVENOUS at 18:12

## 2018-08-20 RX ADMIN — SODIUM CHLORIDE, SODIUM LACTATE, POTASSIUM CHLORIDE, CALCIUM CHLORIDE: 600; 310; 30; 20 INJECTION, SOLUTION INTRAVENOUS at 18:46

## 2018-08-20 RX ADMIN — EPHEDRINE SULFATE 10 MG: 50 INJECTION, SOLUTION INTRAVENOUS at 16:29

## 2018-08-20 RX ADMIN — OXYCODONE HYDROCHLORIDE 10 MG: 5 TABLET ORAL at 21:52

## 2018-08-20 RX ADMIN — HYDROMORPHONE HYDROCHLORIDE 0.5 MG: 1 INJECTION, SOLUTION INTRAMUSCULAR; INTRAVENOUS; SUBCUTANEOUS at 15:57

## 2018-08-20 RX ADMIN — Medication 2 G: at 16:06

## 2018-08-20 RX ADMIN — PROPOFOL 100 MCG/KG/MIN: 10 INJECTION, EMULSION INTRAVENOUS at 16:00

## 2018-08-20 RX ADMIN — PROPOFOL 160 MG: 10 INJECTION, EMULSION INTRAVENOUS at 16:00

## 2018-08-20 RX ADMIN — FENTANYL CITRATE 100 MCG: 50 INJECTION, SOLUTION INTRAMUSCULAR; INTRAVENOUS at 19:00

## 2018-08-20 RX ADMIN — PANTOPRAZOLE SODIUM 40 MG: 40 TABLET, DELAYED RELEASE ORAL at 21:52

## 2018-08-20 RX ADMIN — MIDAZOLAM HYDROCHLORIDE 2 MG: 1 INJECTION, SOLUTION INTRAMUSCULAR; INTRAVENOUS at 15:50

## 2018-08-20 RX ADMIN — DEXMEDETOMIDINE HYDROCHLORIDE 0.5 MCG/KG/HR: 4 INJECTION, SOLUTION INTRAVENOUS at 16:00

## 2018-08-20 RX ADMIN — GABAPENTIN 600 MG: 300 CAPSULE ORAL at 22:38

## 2018-08-20 RX ADMIN — HYDROMORPHONE HYDROCHLORIDE 1 MG: 2 INJECTION, SOLUTION INTRAMUSCULAR; INTRAVENOUS; SUBCUTANEOUS at 19:24

## 2018-08-20 NOTE — BRIEF OP NOTE
BRIEF OPERATIVE NOTE    Date of Procedure: 8/20/2018   Preoperative Diagnosis: LUMBAGO, RADICULOPATHY, DISCITITIS  Postoperative Diagnosis: * No post-op diagnosis entered *    Procedure(s):  L2-3 OBLIQUE LATERAL INTERBODY FUSION WITH ANTOLATERAL PLATING (OARM)  Surgeon(s) and Role:     * Betty Serrano MD - Primary         Surgical Assistant: Bianca Olivera PA-C    Surgical Staff:  Circ-1: Beena Dorado RN  Circ-Relief: Jacob Esposito RN  Scrub RN-1: Travis Engle RN  Float Staff: Cherie Scott RN  Event Time In   Incision Start 1639   Incision Close      Anesthesia: General   Estimated Blood Loss: See full op note  Specimens: * No specimens in log *   Findings: Disc degeneration   Complications: None  Implants:   Implant Name Type Inv.  Item Serial No.  Lot No. LRB No. Used Action   [employer] --  - [id number]  [employer] --  [id number] MUSCULOSKELETAL TRANS [id number] Left 1 Implanted   SPACER 18MM 6DEG 8X55MM -- [employer] - SN/A  SPACER 18MM 6DEG 8X55MM -- [employer] N/A [employer] [id number] N/A 1 Implanted   SCR BNE LUMBAR 5.5X30MM -- [employer] - SN/A  SCR BNE LUMBAR 5.5X30MM -- [employer] N/A [employer] [id number] N/A 1 Implanted   PLATE SPNE LAT LUMBAR 2H SM -- [employer] - SN/A  PLATE SPNE LAT LUMBAR 2H SM -- [employer] N/A [employer] [id number] N/A 1 Implanted   SCR BNE LUMBAR 5.5X30MM -- [employer] - SN/A   SCR BNE LUMBAR 5.5X30MM -- [employer] N/A [employer] [id number] N/A 1 Implanted

## 2018-08-20 NOTE — IP AVS SNAPSHOT
[address] [facility] [address] 
[phone] Patient: Rosemary Garcia MRN: [id number] :1965 About your hospitalization You were admitted on:  2018 You last received care in the:  [address] [place] [address] You were discharged on:  2018 Why you were hospitalized Your primary diagnosis was:  Not on File Your diagnoses also included:  Spinal Stenosis, Lumbar Follow-up Information Follow up With Details Comments Contact Info MD Yany Ruiz [address] [facility] 
[phone] Discharge Orders None A check ganesh indicates which time of day the medication should be taken. My Medications START taking these medications Instructions Each Dose to Equal  
 Morning Noon Evening Bedtime  
 oxyCODONE IR 5 mg immediate release tablet Commonly known as:  Rustamhailee Franklin Your last dose was: Your next dose is: Take 1-2 Tabs by mouth every three (3) hours as needed. Max Daily Amount: 80 mg.  
 5-10 mg CONTINUE taking these medications Instructions Each Dose to Equal  
 Morning Noon Evening Bedtime AFRIN (OXYMETAZOLINE) 0.05 % nasal spray Generic drug:  oxymetazoline Your last dose was: Your next dose is: 2 Sprays by Both Nostrils route two (2) times a day. 2 Spray  
    
   
   
   
  
 b-complex with vitamin c tablet Your last dose was: Your next dose is: Take 1 Tab by mouth daily. 1 Tab  
    
   
   
   
  
 gabapentin 100 mg capsule Commonly known as:  NEURONTIN Your last dose was: Your next dose is: Take 600 mg by mouth two (2) times a day. Back pain 600 mg  
    
   
   
   
  
 hydrocortisone 10 mg tablet Commonly known as:  CORTEF Your last dose was: Your next dose is: Take 20 mg by mouth every morning. 20 mg  
    
   
   
   
  
 LEVOXYL 100 mcg tablet Generic drug:  levothyroxine Your last dose was: Your next dose is: Take 125 mcg by mouth Daily (before breakfast). 125 mcg METHADONE PO Your last dose was: Your next dose is: Take 60 mg by mouth every morning. 60 mg  
    
   
   
   
  
 MULTIVITAMIN PO Your last dose was: Your next dose is: Take 1 Tab by mouth daily. 1 Tab Omeprazole delayed release 20 mg tablet Commonly known as:  PRILOSEC D/R Your last dose was: Your next dose is: Take 20 mg by mouth nightly. 20 mg  
    
   
   
   
  
 simvastatin 10 mg tablet Commonly known as:  ZOCOR Your last dose was: Your next dose is: Take 40 mg by mouth nightly. Indications: HYPERCHOLESTEROLEMIA 40 mg  
    
   
   
   
  
 testosterone cypionate 200 mg/mL injection Commonly known as:  DEPOTESTOTERONE CYPIONATE Your last dose was: Your next dose is:    
   
   
 75 mg by IntraMUSCular route Once every 2 weeks. LAST DOSE 8/6/18.  
 75 mg  
    
   
   
   
  
  
STOP taking these medications   
 diclofenac EC 75 mg EC tablet Commonly known as:  VOLTAREN Where to Get Your Medications Information on where to get these meds will be given to you by the nurse or doctor. ! Ask your nurse or doctor about these medications  
  oxyCODONE IR 5 mg immediate release tablet Opioid Education Prescription Opioids: What You Need to Know: 
 
 
Procedure: Procedure(s): 
L2-3 OBLIQUE LATERAL INTERBODY FUSION WITH ANTOLATERAL PLATING Judy Copeland)  PCP: Bradley Hall MD 
 
Follow up appointments 
-follow up with Dr. Dr. Negra Holland in 2 weeks. Call [phone] to make an appointment as soon as you get home from the hospital. 
 
[address] [place], [place] [address]y: ____________________   phone: _______________________ The agency will contact you to arrange dates/times for visits. Please call them if you do not hear from them within 24 hours after you are discharged Physical therapy 3 times a week for 3 weeks Nursing-initial assessment and as needed When to call your Orthopaedic Surgeon: 
-Signs of infection-if your incision is red; continues to have drainage; drainage has a foul odor or if you have a persistent fever over 101 degrees for 24 hours 
-Nausea or vomiting, severe headache 
-Loss of bowel or bladder function, inability to urinate 
-Changes in sensation in your arms or legs (numbness, tingling, loss of color) -Increased weakness-greater than before your surgery 
-Severe pain or pain not relieved by medications 
-Signs of a blood clot in your leg-calf pain, tenderness, redness, swelling of lower leg When to call your Primary Care Physician: 
-Concerns about medical conditions such as diabetes, high blood pressure, asthma, congestive heart failure 
-Call if blood sugars are elevated, persistent headache or dizziness, coughing or congestion, constipation or diarrhea, burning with urination, abnormal heart rate When to call 911 and go to the nearest emergency room: 
-Acute onset of chest pain, shortness of breath, difficulty breathing Activity 
-You are going home a well person, be as active as possible.   Your only exercise should be walking. Start with short frequent walks and increase your walking distance each day. 
-Limit the amount of time you sit to 20-30 minute intervals. Sitting for prolonged periods of time will be uncomfortable for you following surgery. 
-Do NOT lift anything over 5 pounds 
-Do NOT do any straining, twisting or bending 
-When you are in bed, you may lay on your back or on either side. Do NOT lie on your stomach Brace 
-If you have a back brace, you should wear your brace at all times when you are out of bed. Do not wear the brace while in bed or showering. 
-Remember to always wear a cotton t-shirt underneath your brace. 
-Do not bend or twist when your brace is off Diet 
-Resume usual diet; drink plenty of fluids; eat foods high in fiber 
-It is important to have regular bowel movements. Pain medications may cause constipation. You may want to take a stool softener (such as Senokot-S or Colace) to prevent constipation. 
 -If constipation occurs, take a laxative (such as Dulcolax tablets, Milk of Magnesia, or a suppository). Laxatives should only be used if the above preventable measures have failed and you still have not had a bowel movement after three days Driving 
-You may not drive or return to work until instructed by your physician. However, you may ride in the car for short periods of time. Incision Care Your incision has been closed with absorbable sutures and the Zipline skin closure system. This will assist with healing. The Amparo Rowels is to remain on your incision for 2 weeks. A dry dressing (ABD and tape) will be placed over it and should be changed daily, for at least the first several days after your surgery. If you have no incisional drainage, you may leave the incision open to air if you wish, still leaving the Zipline in place. Please make sure to wash your hands prior to touching your dressing. You may take brief showers but do not run the water directly onto the wound. After your shower, blot your incision dry with a soft towel and replace the dry dressing. Do not allow the tape to come in contact with the Zipline. Do not rub or apply any lotions or ointments to your incision site. Do not soak or scrub your wound. The Zipline dressing will be removed during your two week follow-up appointment. If you experience drainage leaking from underneath the Zipline or if it peels off before 2 weeks, please contact your orthopedic surgeons office. Showering 
-You may shower in approximately 4 days after your surgery.   
-Leave the dressing on during your shower. Do NOT allow the water to run directly onto your dressing. Once you get out of the shower, gently pat the dressing dry. -Reminder- your brace can be removed while showering. Remember to not bend or twist while your brace is off.   
-Do not take a tub bath. Preventing blood clots 
-You have been given T.E.D. stockings to wear. Continue to wear these for 7 days after your discharge. Put them on in the morning and take them off at night.   
-They are used to increase your circulation and prevent blood clots from forming in your legs 
-T. E.D. stockings can be machine washed, temperature not to exceed 160° F (71°C) and machine dried for 15 to 20 minutes, temperature not to exceed 250° F (121°C). Pain management 
-Take pain medication as prescribed; decrease the amount you use as your pain lessens 
-DO not wait until you are in extreme pain to take your medication. 
-Avoid alcoholic beverages while taking pain medication Pain Medication Safety DO: 
-Read the Medication Guide  
-Take your medicine exactly as prescribed  
-Store your medicine away from children and in a safe place  
-Flush unused medicine down the toilet  
-Call your healthcare provider for medical advice about side effects.  You may report side effects to FDA at 3-059-FDA-7330.  
-Please be aware that many medications contain Tylenol. We do not want you to over medicate so please read the information below as a guide. Do not take more than 4 Grams of Tylenol in a 24 hour period. (There are 1000 milligrams in one Gram) Percocet contains 325 mg of Tylenol per tablet (do not take more than 12 tablets in 24 hours) Lortab contains 500 mg of Tylenol per tablet (do not take more than 8 tablets in 24 hours) Norco contains 325 mg of Tylenol per tablet (do not take more than 12 tablets in 24 hours). DO NOT: 
-Do not give your medicine to others  
-Do not take medicine unless it was prescribed for you  
-Do not stop taking your medicine without talking to your healthcare provider  
-Do not break, chew, crush, dissolve, or inject your medicine. If you cannot swallow your medicine whole, talk to your healthcare provider. 
-Do not drink alcohol while taking this medicine 
-Do not take anti-inflammatory medications or aspirin unless instructed by your     physician. Introducing [facility]! Blanca Hernandez introduces [email or web address] patient portal. Now you can access parts of your medical record, email your doctor's office, and request medication refills online. 1. In your internet browser, go to [email or web address] 2. Click on the First Time User? Click Here link in the Sign In box. You will see the New Member Sign Up page. 3. Enter your [email or web address] Access Code exactly as it appears below. You will not need to use this code after youve completed the sign-up process. If you do not sign up before the expiration date, you must request a new code. · [employer] Access Code: [email or web address] Expires: 11/5/2018  8:59 AM 
 
4. Enter the last four digits of your Social Security Number (xxxx) and Date of Birth (mm/dd/yyyy) as indicated and click Submit. You will be taken to the next sign-up page. 5. Create a [employer] ID. This will be your [employer] login ID and cannot be changed, so think of one that is secure and easy to remember. 6. Create a [employer] password. You can change your password at any time. 7. Enter your Password Reset Question and Answer. This can be used at a later time if you forget your password. 8. Enter your e-mail address. You will receive e-mail notification when new information is available in [address] 9. Click Sign Up. You can now view and download portions of your medical record. 10. Click the Download Summary menu link to download a portable copy of your medical information. If you have questions, please visit the Frequently Asked Questions section of the [employer] website. Remember, [employer] is NOT to be used for urgent needs. For medical emergencies, dial 911. Now available from your iPhone and Android! Introducing Pastor Keita As a [facility] patient, I wanted to make you aware of our electronic visit tool called Pastor Denischandrakant. [facility] 24/7 allows you to connect within minutes with a medical provider 24 hours a day, seven days a week via a mobile device or tablet or logging into a secure website from your computer. You can access Pastor Keita from anywhere in the [place]. A virtual visit might be right for you when you have a simple condition and feel like you just dont want to get out of bed, or cant get away from work for an appointment, when your regular [facility] provider is not available (evenings, weekends or holidays), or when youre out of town and need minor care.   Electronic visits cost only $49 and if the [facility] 24/7 provider determines a prescription is needed to treat your condition, one can be electronically transmitted to a nearby pharmacy*. Please take a moment to enroll today if you have not already done so. The enrollment process is free and takes just a few minutes. To enroll, please download the [facility] 24/7 kyler to your tablet or phone, or visit [email or web address] to enroll on your computer. And, as an [address] [place] [address] patient with a KnowRe account, the results of your visits will be scanned into your electronic medical record and your primary care provider will be able to view the scanned results. We urge you to continue to see your regular Aditya Naranjo provider for your ongoing medical care. And while your primary care provider may not be the one available when you seek a Pastor Barriosjonofin virtual visit, the peace of mind you get from getting a real diagnosis real time can be priceless. For more information on DrinkSendojonofin, view our Frequently Asked Questions (FAQs) at [email or web address]. Sincerely, 
 
Ted Cannon MD 
Chief Medical Officer [employer] *:  certain medications cannot be prescribed via [employer] Providers Seen During Your Hospitalization Provider Specialty Primary office phone Joanne Feild MD Orthopedic Surgery [phone] Your Primary Care Physician (PCP) Primary Care Physician Office Phone Office Fax Gina Hernandez [phone] You are allergic to the following Allergen Reactions Iodinated Contrast- Oral And Iv Dye Hives Recent Documentation Height Weight BMI Smoking Status 1.803 m 86.2 kg 26.5 kg/m2 Current Every Day Smoker Emergency Contacts Name Discharge Info Relation Home Work Mobile Emily CAREGIVER [3] Spouse [3] [phone] Patient Belongings The following personal items are in your possession at time of discharge: 
  Dental Appliances: None  Visual Aid: Glasses             Clothing: With patient    Other Valuables: Brace, With patient Please provide this summary of care documentation to your next provider. Signatures-by signing, you are acknowledging that this After Visit Summary has been reviewed with you and you have received a copy. Patient Signature:  ____________________________________________________________ Date:  ____________________________________________________________  
  
[facility] Provider Signature:  ____________________________________________________________ Date:  ____________________________________________________________

## 2018-08-20 NOTE — IP AVS SNAPSHOT
[address] [facility] [address] [place] [address] 
[phone] Patient: Tessy Mcleod. MRN: [id number] :1965 A check ganesh indicates which time of day the medication should be taken. My Medications START taking these medications Instructions Each Dose to Equal  
 Morning Noon Evening Bedtime  
 oxyCODONE IR 5 mg immediate release tablet Commonly known as:  Danne Copper Your last dose was: Your next dose is: Take 1-2 Tabs by mouth every three (3) hours as needed. Max Daily Amount: 80 mg.  
 5-10 mg CONTINUE taking these medications Instructions Each Dose to Equal  
 Morning Noon Evening Bedtime AFRIN (OXYMETAZOLINE) 0.05 % nasal spray Generic drug:  oxymetazoline Your last dose was: Your next dose is: 2 Sprays by Both Nostrils route two (2) times a day. 2 Spray  
    
   
   
   
  
 b-complex with vitamin c tablet Your last dose was: Your next dose is: Take 1 Tab by mouth daily. 1 Tab  
    
   
   
   
  
 gabapentin 100 mg capsule Commonly known as:  NEURONTIN Your last dose was: Your next dose is: Take 600 mg by mouth two (2) times a day. Back pain 600 mg  
    
   
   
   
  
 hydrocortisone 10 mg tablet Commonly known as:  CORTEF Your last dose was: Your next dose is: Take 20 mg by mouth every morning. 20 mg  
    
   
   
   
  
 LEVOXYL 100 mcg tablet Generic drug:  levothyroxine Your last dose was: Your next dose is: Take 125 mcg by mouth Daily (before breakfast). 125 mcg METHADONE PO Your last dose was: Your next dose is: Take 60 mg by mouth every morning. 60 mg  
    
   
   
   
  
 MULTIVITAMIN PO Your last dose was: Your next dose is: Take 1 Tab by mouth daily. 1 Tab Omeprazole delayed release 20 mg tablet Commonly known as:  PRILOSEC D/R Your last dose was: Your next dose is: Take 20 mg by mouth nightly. 20 mg  
    
   
   
   
  
 simvastatin 10 mg tablet Commonly known as:  ZOCOR Your last dose was: Your next dose is: Take 40 mg by mouth nightly. Indications: HYPERCHOLESTEROLEMIA 40 mg  
    
   
   
   
  
 testosterone cypionate 200 mg/mL injection Commonly known as:  DEPOTESTOTERONE CYPIONATE Your last dose was: Your next dose is:    
   
   
 75 mg by IntraMUSCular route Once every 2 weeks. LAST DOSE 8/6/18.  
 75 mg  
    
   
   
   
  
  
STOP taking these medications   
 diclofenac EC 75 mg EC tablet Commonly known as:  VOLTAREN Where to Get Your Medications Information on where to get these meds will be given to you by the nurse or doctor. ! Ask your nurse or doctor about these medications  
  oxyCODONE IR 5 mg immediate release tablet

## 2018-08-20 NOTE — ANESTHESIA PREPROCEDURE EVALUATION
Anesthetic History   No history of anesthetic complications            Review of Systems / Medical History  Patient summary reviewed, nursing notes reviewed and pertinent labs reviewed    Pulmonary          Smoker         Neuro/Psych   Within defined limits           Cardiovascular  Within defined limits                     GI/Hepatic/Renal     GERD           Endo/Other      Hypothyroidism       Other Findings   Comments: Opioid dependence  Alfonso's disease           Physical Exam    Airway  Mallampati: II  TM Distance: > 6 cm  Neck ROM: normal range of motion   Mouth opening: Normal     Cardiovascular  Regular rate and rhythm,  S1 and S2 normal,  no murmur, click, rub, or gallop             Dental  No notable dental hx       Pulmonary  Breath sounds clear to auscultation               Abdominal  GI exam deferred       Other Findings            Anesthetic Plan    ASA: 2  Anesthesia type: general          Induction: Intravenous  Anesthetic plan and risks discussed with: Patient

## 2018-08-20 NOTE — ROUTINE PROCESS
Patient: Victor Hugo Jaramillo. MRN: [id number]  SSN: [id number]   YOB: 1965  Age: 47 y.o. Sex: male     Patient is status post Procedure(s):  L2-3 OBLIQUE LATERAL INTERBODY FUSION WITH ANTOLATERAL PLATING (OARM).     Surgeon(s) and Role:     * Millie Zhang MD - Primary    Local/Dose/Irrigation: see MAR                  Peripheral IV 08/20/18 Left;Right Forearm (Active)   Site Assessment Clean, dry, & intact 8/20/2018  3:15 PM   Phlebitis Assessment 0 8/20/2018  3:15 PM   Infiltration Assessment 0 8/20/2018  3:15 PM   Dressing Status Occlusive 8/20/2018  3:15 PM   Dressing Type Transparent 8/20/2018  3:15 PM   Hub Color/Line Status Pink;Flushed 8/20/2018  3:15 PM                           Dressing/Packing:  Wound Back-DRESSING TYPE:  (zip closure device, medipore tape) (08/20/18 1700)  Splint/Cast:  ]    Other:

## 2018-08-21 LAB
ANION GAP SERPL CALC-SCNC: 9 MMOL/L (ref 5–15)
BUN SERPL-MCNC: 11 MG/DL (ref 6–20)
BUN/CREAT SERPL: 13 (ref 12–20)
CALCIUM SERPL-MCNC: 7.7 MG/DL (ref 8.5–10.1)
CHLORIDE SERPL-SCNC: 102 MMOL/L (ref 97–108)
CO2 SERPL-SCNC: 26 MMOL/L (ref 21–32)
CREAT SERPL-MCNC: 0.87 MG/DL (ref 0.7–1.3)
GLUCOSE SERPL-MCNC: 101 MG/DL (ref 65–100)
HGB BLD-MCNC: 15.3 G/DL (ref 12.1–17)
POTASSIUM SERPL-SCNC: 3.7 MMOL/L (ref 3.5–5.1)
SODIUM SERPL-SCNC: 137 MMOL/L (ref 136–145)

## 2018-08-21 PROCEDURE — 97161 PT EVAL LOW COMPLEX 20 MIN: CPT

## 2018-08-21 PROCEDURE — G8980 MOBILITY D/C STATUS: HCPCS

## 2018-08-21 PROCEDURE — G8979 MOBILITY GOAL STATUS: HCPCS

## 2018-08-21 PROCEDURE — 74011250636 HC RX REV CODE- 250/636: Performed by: PHYSICIAN ASSISTANT

## 2018-08-21 PROCEDURE — 80048 BASIC METABOLIC PNL TOTAL CA: CPT | Performed by: PHYSICIAN ASSISTANT

## 2018-08-21 PROCEDURE — 65270000029 HC RM PRIVATE

## 2018-08-21 PROCEDURE — 51798 US URINE CAPACITY MEASURE: CPT

## 2018-08-21 PROCEDURE — G8978 MOBILITY CURRENT STATUS: HCPCS

## 2018-08-21 PROCEDURE — 74011250637 HC RX REV CODE- 250/637: Performed by: PHYSICIAN ASSISTANT

## 2018-08-21 PROCEDURE — 97530 THERAPEUTIC ACTIVITIES: CPT

## 2018-08-21 PROCEDURE — 94760 N-INVAS EAR/PLS OXIMETRY 1: CPT

## 2018-08-21 PROCEDURE — 97535 SELF CARE MNGMENT TRAINING: CPT

## 2018-08-21 PROCEDURE — 97116 GAIT TRAINING THERAPY: CPT

## 2018-08-21 PROCEDURE — 85018 HEMOGLOBIN: CPT | Performed by: PHYSICIAN ASSISTANT

## 2018-08-21 PROCEDURE — 97165 OT EVAL LOW COMPLEX 30 MIN: CPT

## 2018-08-21 PROCEDURE — 36415 COLL VENOUS BLD VENIPUNCTURE: CPT | Performed by: PHYSICIAN ASSISTANT

## 2018-08-21 RX ORDER — OXYCODONE HYDROCHLORIDE 5 MG/1
5-10 TABLET ORAL
Qty: 80 TAB | Refills: 0 | Status: SHIPPED | OUTPATIENT
Start: 2018-08-21 | End: 2018-11-21

## 2018-08-21 RX ORDER — KETOROLAC TROMETHAMINE 30 MG/ML
30 INJECTION, SOLUTION INTRAMUSCULAR; INTRAVENOUS
Status: DISCONTINUED | OUTPATIENT
Start: 2018-08-21 | End: 2018-08-22 | Stop reason: HOSPADM

## 2018-08-21 RX ADMIN — Medication 2 G: at 08:26

## 2018-08-21 RX ADMIN — OXYCODONE HYDROCHLORIDE 10 MG: 5 TABLET ORAL at 08:25

## 2018-08-21 RX ADMIN — ACETAMINOPHEN 1000 MG: 500 TABLET ORAL at 06:58

## 2018-08-21 RX ADMIN — METHADONE HYDROCHLORIDE 60 MG: 10 TABLET ORAL at 08:24

## 2018-08-21 RX ADMIN — OXYCODONE HYDROCHLORIDE 10 MG: 5 TABLET ORAL at 17:31

## 2018-08-21 RX ADMIN — KETOROLAC TROMETHAMINE 30 MG: 30 INJECTION, SOLUTION INTRAMUSCULAR; INTRAVENOUS at 23:08

## 2018-08-21 RX ADMIN — PANTOPRAZOLE SODIUM 40 MG: 40 TABLET, DELAYED RELEASE ORAL at 21:55

## 2018-08-21 RX ADMIN — THERA TABS 1 TABLET: TAB at 10:58

## 2018-08-21 RX ADMIN — HYDROCORTISONE 20 MG: 10 TABLET ORAL at 06:58

## 2018-08-21 RX ADMIN — LEVOTHYROXINE SODIUM 125 MCG: 125 TABLET ORAL at 06:58

## 2018-08-21 RX ADMIN — CYCLOBENZAPRINE HYDROCHLORIDE 10 MG: 10 TABLET, FILM COATED ORAL at 10:58

## 2018-08-21 RX ADMIN — SIMVASTATIN 40 MG: 40 TABLET, FILM COATED ORAL at 21:55

## 2018-08-21 RX ADMIN — DOCUSATE SODIUM AND SENNOSIDES 1 TABLET: 8.6; 5 TABLET, FILM COATED ORAL at 17:31

## 2018-08-21 RX ADMIN — DOCUSATE SODIUM AND SENNOSIDES 1 TABLET: 8.6; 5 TABLET, FILM COATED ORAL at 10:58

## 2018-08-21 RX ADMIN — Medication 10 ML: at 07:01

## 2018-08-21 RX ADMIN — KETOROLAC TROMETHAMINE 30 MG: 30 INJECTION, SOLUTION INTRAMUSCULAR; INTRAVENOUS at 10:58

## 2018-08-21 RX ADMIN — KETOROLAC TROMETHAMINE 30 MG: 30 INJECTION, SOLUTION INTRAMUSCULAR; INTRAVENOUS at 17:31

## 2018-08-21 RX ADMIN — OXYCODONE HYDROCHLORIDE 10 MG: 5 TABLET ORAL at 01:14

## 2018-08-21 RX ADMIN — ACETAMINOPHEN 1000 MG: 500 TABLET ORAL at 17:31

## 2018-08-21 RX ADMIN — Medication 10 ML: at 21:56

## 2018-08-21 RX ADMIN — GABAPENTIN 600 MG: 300 CAPSULE ORAL at 17:31

## 2018-08-21 RX ADMIN — ACETAMINOPHEN 1000 MG: 500 TABLET ORAL at 12:11

## 2018-08-21 RX ADMIN — OXYCODONE HYDROCHLORIDE 10 MG: 5 TABLET ORAL at 12:11

## 2018-08-21 RX ADMIN — OXYCODONE HYDROCHLORIDE 10 MG: 5 TABLET ORAL at 04:26

## 2018-08-21 RX ADMIN — OXYCODONE HYDROCHLORIDE 10 MG: 5 TABLET ORAL at 23:08

## 2018-08-21 RX ADMIN — GABAPENTIN 600 MG: 300 CAPSULE ORAL at 10:58

## 2018-08-21 NOTE — PROGRESS NOTES
physical Therapy EVALUATION/DISCHARGE  Patient: Rae Lopez (49 y.o. male)  Date: 8/21/2018  Primary Diagnosis: LUMBAGO, RADICULOPATHY, DISCITITIS  Spinal stenosis, lumbar  Procedure(s) (LRB):  L2-3 OBLIQUE LATERAL INTERBODY FUSION WITH ANTOLATERAL PLATING (OARM) (N/A) 1 Day Post-Op   Precautions:   Back, No bending, no lifting greater than 5 lbs, no twisting, log-roll technique, repositioning every 20-30 min except when sleeping, brace when OOB    ASSESSMENT :  Based on the objective data described below, the patient presents with decreased activity tolerance due to severe back pain however demonstrates independence with bed mobility using log roll technique, transfers, and ambulation x 200 feet. Pt cleared on full flight of stairs and demonstrated modified independence. Pt was provided with education regarding back precautions, donning TLSO, and sitting restrictions. Pt is cleared for discharge from a PT standpoint and no further services are indicated at this time. PLAN :  Discharge Recommendations: None  Further Equipment Recommendations for Discharge: recommended a reacher     SUBJECTIVE:   Patient stated Why am I hurting over here on the left side(of back). 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[address]     Chest pain 8/22/2017    Chronic back pain 8/13/2017    Chronic insomnia 8/10/2017    Difficulty in urination 8/10/2017    Endocrine disease     raman's disease    GERD (gastroesophageal reflux disease)     Hypercholesteremia     Hypogonadism male 8/10/2017    Hypothyroidism 8/10/2017    Kidney stones     multiple, has had lithotripsey as well as passed on his own    Low testosterone 8/10/2018    Nicotine vapor product user     OCCASSIONAL     Opioid dependence ([address] 8/10/2017    Other ill-defined conditions(799.89)     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, on methadone    Thumb pain 8/10/2017    Vitamin D deficiency 8/10/2017     Past Surgical History:   Procedure Laterality Date    HX ORTHOPAEDIC      LEFT ROTATER CUFF REPAIR     HX OTHER SURGICAL      pylonidal cyst    HX OTHER SURGICAL      penial fracture     Prior Level of Function/Home Situation: independent   Personal factors and/or comorbidities impacting plan of care:     Home Situation  Home Environment: Private residence  # Steps to Enter: 3  Rails to Enter: Yes  Hand Rails : Right  One/Two Story Residence: Two story  # of Interior Steps: 15  Interior Rails: Left  Living Alone: No  Support Systems: Family member(s)  Patient Expects to be Discharged to[de-identified] Private residence  Current DME Used/Available at Home: None  Tub or Shower Type: Tub/Shower combination    EXAMINATION/PRESENTATION/DECISION MAKING:   Critical Behavior:  Neurologic State: Alert, Appropriate for age  Orientation Level: Oriented X4  Cognition: Appropriate decision making, Appropriate for age attention/concentration, Appropriate safety awareness  Safety/Judgement: Insight into deficits, Good awareness of safety precautions, Fall prevention, Home safety       Skin:  Dressing intact    Range Of Motion:   within functional limits                        Strength:     within functional limits                     Tone & Sensation:    intact                              Coordination:   intact       Functional Mobility:  Bed Mobility:  Rolling: Independent  Supine to Sit: Independent     Scooting: Independent  Transfers:  Sit to Stand: Independent  Stand to Sit: Independent                       Balance:   Sitting: Intact  Standing: Intact  Ambulation/Gait Training:  Distance (ft): 200 Feet (ft)  Assistive Device: Brace/Splint;Gait belt  Ambulation - Level of Assistance: Independent     Gait Description (WDL): Within defined limits  Gait Abnormalities: Antalgic                                   Stairs:  Number of Stairs Trained: 13  Stairs - Level of Assistance: Modified independent   Rail Use: Left          Functional Measure:  Tinetti test:    Sitting Balance: 1  Arises: 2  Attempts to Rise: 2  Immediate Standing Balance: 2  Standing Balance: 2  Nudged: 2  Eyes Closed: 1  Turn 360 Degrees - Continuous/Discontinuous: 1  Turn 360 Degrees - Steady/Unsteady: 1  Sitting Down: 2  Balance Score: 16  Indication of Gait: 1  R Step Length/Height: 1  L Step Length/Height: 1  R Foot Clearance: 1  L Foot Clearance: 1  Step Symmetry: 1  Step Continuity: 1  Path: 2  Trunk: 2  Walking Time: 1  Gait Score: 12  Total Score: 28       Tinetti Test and G-code impairment scale:  Percentage of Impairment CH    0%   CI    1-19% CJ    20-39% CK    40-59% CL    60-79% CM    80-99% CN     100%   Tinetti  Score 0-28 28 23-27 17-22 12-16 6-11 1-5 0       Tinetti Tool Score Risk of Falls  <19 = High Fall Risk  19-24 = Moderate Fall Risk  25-28 = Low Fall Risk  Tinetti ME. Performance-Oriented Assessment of Mobility Problems in Elderly Patients. [place] 66; [id number]. (Scoring Description: PT Bulletin Feb. 10, 1993)    Older adults: Clinton Dodd et al, 2009; n = 1000 [facility] elderly evaluated with ABC, CATARINO, ADL, and IADL)  · Mean CATARINO score for males aged 69-68 years = 26.21(3.40)  · Mean CATARINO score for females age 69-68 years = 25.16(4.30)  · Mean CATARINO score for males over 80 years = 23.29(6.02)  · Mean CATARINO score for females over 80 years = 17.20(8.32)       G codes: In compliance with CMSs Claims Based Outcome Reporting, the following G-code set was chosen for this patient based on their primary functional limitation being treated: The outcome measure chosen to determine the severity of the functional limitation was the Tinneti with a score of 28/28 which was correlated with the impairment scale.     ? Mobility - Walking and Moving Around:     - CURRENT STATUS: CH - 0% impaired, limited or restricted    - GOAL STATUS: CH - 0% impaired, limited or restricted    - D/C STATUS:  CH - 0% impaired, limited or restricted Physical Therapy Evaluation Charge Determination   History Examination Presentation Decision-Making   LOW Complexity : Zero comorbidities / personal factors that will impact the outcome / POC LOW Complexity : 1-2 Standardized tests and measures addressing body structure, function, activity limitation and / or participation in recreation  LOW Complexity : Stable, uncomplicated  LOW Complexity : FOTO score of       Based on the above components, the patient evaluation is determined to be of the following complexity level: LOW     Pain:  Pain Scale 1: Numeric (0 - 10)  Pain Intensity 1: 9  Pain Location 1: Back  Activity Tolerance:   Fair, reports severe back pain  Please refer to the flowsheet for vital signs taken during this treatment. After treatment:   [x]   Patient left in no apparent distress sitting up in chair  []   Patient left in no apparent distress in bed  [x]   Call bell left within reach  [x]   Nursing notified  []   Caregiver present  []   Bed alarm activated    COMMUNICATION/EDUCATION:   Communication/Collaboration:  [x]   Fall prevention education was provided and the patient/caregiver indicated understanding. [x]   Patient/family have participated as able and agree with findings and recommendations. []   Patient is unable to participate in plan of care at this time.   Findings and recommendations were discussed with: Registered Nurse    Thank you for this referral.  Vannesa Rahman   Time Calculation: 37 mins

## 2018-08-21 NOTE — PROGRESS NOTES
Primary Nurse Myla Sanchez RN and Alex Chang RN performed a dual skin assessment on this patient No impairment noted  Paul score is 20

## 2018-08-21 NOTE — PROGRESS NOTES
Care Management Interventions  PCP Verified by CM: Yes  Palliative Care Criteria Met (RRAT>21 & CHF Dx)?: No  Transition of Care Consult (CM Consult): Discharge Planning  MyChart Signup: No  Discharge Durable Medical Equipment: No  Physical Therapy Consult: Yes  Occupational Therapy Consult: Yes  Speech Therapy Consult: No  Current Support Network: Own Home, Lives with Spouse  Confirm Follow Up Transport: Family  Discharge Location  Discharge Placement: Home with family assistance    Reason for Admission:   L2-3 OBLIQUE LATERAL INTERBODY FUSION WITH ANTOLATERAL PLATING (OARM) (                   RRAT Score:  2                   Plan for utilizing home health: Not indicated                         Likelihood of Readmission:  low                         Transition of Care Plan:   Chart reviewed for transitions of care, discussed patient during rounds. Patient was admitted for the above surgery and has been cleared by therapy to go home with no needs. Consult completed.   [employer] , [place]

## 2018-08-21 NOTE — OP NOTES
[facility]  OPERATIVE REPORT    Name:MAISHA Harris  MR#: [id number]  : 1965  ACCOUNT #: [de-identified]   DATE OF SERVICE: 2018    PREOPERATIVE DIAGNOSES:  Lumbar stenosis L2-3, lumbar spondylosis L2-3. POSTOPERATIVE DIAGNOSES:  Lumbar stenosis L2-3, lumbar spondylosis L2-3. PROCEDURE PERFORMED:  Anterior lateral diskectomy, L2-3, with anterior lateral interbody fusion and anterior lateral plating, L2-3. SURGEON:  Asiya Farmer MD     ASSISTANT:  Josiane Goetz PA-C    ANESTHESIA:  General    ESTIMATED BLOOD LOSS:  Minimal.    COMPLICATIONS:  None. SPECIMENS REMOVED:  None    IMPLANTS:  Medtronic DLIF and Anterolateral plate      INDICATIONS:  This is a pleasant 41-year-old gentleman with chronic back pain with severe degenerative changes and Modic changes at L2-3, for stabilization of that segment for pain control. DESCRIPTION OF PROCEDURE:  The patient was identified, brought to the operative suite, placed in the lateral position. Patient had the abdomen and flank prepped and draped sterilely. Preoperative neuromonitoring was placed, baselines obtained. These remained stable throughout the surgical procedure. Mireles catheter as well as neuromonitoring placed. After prepping and draping, I then placed a marker pin in the PSIS for the array. We then did an intraoperative CT scan spin with the O-arm. We then marked out the skin incision for docking directly midline on the psoas. We then did a sharp incision directly over the L2-3 disk space and did a sequential guided dilation down to the psoas muscle. This was bluntly dissected off the disk space. Anterior osteophytes removed. We placed radiolucent retractors. Annulotomy was performed. We did a diskectomy of the remainder of the disk and released both endplates with a Chavez. We subsequently did a careful distraction of the disk space to approximately 8 mm.   The 8 x 55 mm trial did provide good anterior column restoration as well as some restoration of some foraminal height. We then rasped the endplates and then packed a Medtronic DLIF graft with Meenu allograft. This has a titanium coating. This was done then directly using navigation, was impacted into place across the disk space. Afterwards, we then used a 2-hole anterolateral plate spanning the disk space. We then placed 25 mm screws into the L2 and L3 vertebral body for fixation and antimigration. Wound was thoroughly irrigated. Standard closure. The patient returned to PACU in stable condition.       Virgina Bosworth, MD Bertell Shook / DN  D: 08/21/2018 14:39     T: 08/21/2018 16:38  JOB #: [id number]

## 2018-08-21 NOTE — PROGRESS NOTES
TRANSFER - IN REPORT:    Verbal report received from Emma(name) on Carlos A Jacome.  being received from PACU(unit) for routine post - op      Report consisted of patients Situation, Background, Assessment and   Recommendations(SBAR). Information from the following report(s) SBAR, Kardex, Intake/Output and MAR was reviewed with the receiving nurse. Opportunity for questions and clarification was provided. Assessment completed upon patients arrival to unit and care assumed.

## 2018-08-21 NOTE — PERIOP NOTES
1900 Patient writhing in pain despite medications given in OR. Spoke with Dr. Norman Peng and received orders for dilaudid and Ativan. 2030 VS stable. Awake and alert. Resting comfortably. Patient denies nausea. Pain improved. No signs of excessive bleeding. Ready to transfer from PACU.

## 2018-08-21 NOTE — PROGRESS NOTES
Spiritual Care Partner Volunteer visited patient in Rm 542 on 8/21/18. Documented by:   Chaplain Buitrago MDiv, MARIEL  [phone] ([id number])

## 2018-08-21 NOTE — ANESTHESIA POSTPROCEDURE EVALUATION
Post-Anesthesia Evaluation and Assessment    Patient: Bruno Luna MRN: [id number]  SSN: [id number]    YOB: 1965  Age: 47 y.o. Sex: male       Cardiovascular Function/Vital Signs  Visit Vitals    /57    Pulse 63    Temp 36.4 °C (97.5 °F)    Resp 12    Ht 5' 11\" (1.803 m)    Wt 86.2 kg (190 lb)    SpO2 97%    BMI 26.5 kg/m2       Patient is status post general anesthesia for Procedure(s):  L2-3 OBLIQUE LATERAL INTERBODY FUSION WITH ANTOLATERAL PLATING (OARM). Nausea/Vomiting: None    Postoperative hydration reviewed and adequate. Pain:  Pain Scale 1: Numeric (0 - 10) (08/20/18 2014)  Pain Intensity 1: 10 (08/20/18 2014)   Managed    Neurological Status:   Neuro (WDL): Within Defined Limits (08/20/18 1900)   At baseline    Mental Status and Level of Consciousness: Arousable    Pulmonary Status:   O2 Device: Nasal cannula (08/20/18 1900)   Adequate oxygenation and airway patent    Complications related to anesthesia: None    Post-anesthesia assessment completed.  No concerns    Signed By: Sang Peoples MD     August 20, 2018

## 2018-08-21 NOTE — DISCHARGE INSTRUCTIONS
After Hospital Care Plan:  Discharge Instructions Lumbar Fusion Surgery   Dr. Gabriel Hunt   Patient Name: Mckenna Rose Date of procedure: 8/20/2018  Date of discharge:     Procedure: Procedure(s):  L2-3 OBLIQUE LATERAL INTERBODY FUSION WITH ANTOLATERAL PLATING Jonatan Hyman)  PCP: Candice Soto MD    Follow up appointments  -follow up with Dr. Dr. Gabriel Hunt in 2 weeks. Call [phone] to make an appointment as soon as you get home from the hospital.    [address] [place], [place] [address]y: ____________________   phone: _______________________  The agency will contact you to arrange dates/times for visits. Please call them if you do not hear from them within 24 hours after you are discharged  Physical therapy 3 times a week for 3 weeks  Nursing-initial assessment and as needed    When to call your Orthopaedic Surgeon:  -Signs of infection-if your incision is red; continues to have drainage; drainage has a foul odor or if you have a persistent fever over 101 degrees for 24 hours  -Nausea or vomiting, severe headache  -Loss of bowel or bladder function, inability to urinate  -Changes in sensation in your arms or legs (numbness, tingling, loss of color)  -Increased weakness-greater than before your surgery  -Severe pain or pain not relieved by medications  -Signs of a blood clot in your leg-calf pain, tenderness, redness, swelling of lower leg    When to call your Primary Care Physician:  -Concerns about medical conditions such as diabetes, high blood pressure, asthma, congestive heart failure  -Call if blood sugars are elevated, persistent headache or dizziness, coughing or congestion, constipation or diarrhea, burning with urination, abnormal heart rate    When to call 911 and go to the nearest emergency room:  -Acute onset of chest pain, shortness of breath, difficulty breathing    Activity  -You are going home a well person, be as active as possible. Your only exercise should be walking.   Start with short frequent walks and increase your walking distance each day.  -Limit the amount of time you sit to 20-30 minute intervals. Sitting for prolonged periods of time will be uncomfortable for you following surgery.  -Do NOT lift anything over 5 pounds  -Do NOT do any straining, twisting or bending  -When you are in bed, you may lay on your back or on either side. Do NOT lie on your stomach    Brace  -If you have a back brace, you should wear your brace at all times when you are out of bed. Do not wear the brace while in bed or showering.  -Remember to always wear a cotton t-shirt underneath your brace.  -Do not bend or twist when your brace is off    Diet  -Resume usual diet; drink plenty of fluids; eat foods high in fiber  -It is important to have regular bowel movements. Pain medications may cause constipation. You may want to take a stool softener (such as Senokot-S or Colace) to prevent constipation.   -If constipation occurs, take a laxative (such as Dulcolax tablets, Milk of Magnesia, or a suppository). Laxatives should only be used if the above preventable measures have failed and you still have not had a bowel movement after three days    Driving  -You may not drive or return to work until instructed by your physician. However, you may ride in the car for short periods of time. Incision Care  Your incision has been closed with absorbable sutures and the Zipline skin closure system. This will assist with healing. The Alvenia Session is to remain on your incision for 2 weeks. A dry dressing (ABD and tape) will be placed over it and should be changed daily, for at least the first several days after your surgery. If you have no incisional drainage, you may leave the incision open to air if you wish, still leaving the Zipline in place. Please make sure to wash your hands prior to touching your dressing. You may take brief showers but do not run the water directly onto the wound.  After your shower, blot your incision dry with a soft towel and replace the dry dressing. Do not allow the tape to come in contact with the Zipline. Do not rub or apply any lotions or ointments to your incision site. Do not soak or scrub your wound. The Zipline dressing will be removed during your two week follow-up appointment. If you experience drainage leaking from underneath the Zipline or if it peels off before 2 weeks, please contact your orthopedic surgeons office. Showering  -You may shower in approximately 4 days after your surgery.    -Leave the dressing on during your shower. Do NOT allow the water to run directly onto your dressing. Once you get out of the shower, gently pat the dressing dry. -Reminder- your brace can be removed while showering. Remember to not bend or twist while your brace is off.    -Do not take a tub bath. Preventing blood clots  -You have been given T.E.D. stockings to wear. Continue to wear these for 7 days after your discharge. Put them on in the morning and take them off at night.    -They are used to increase your circulation and prevent blood clots from forming in your legs  -T. E.D. stockings can be machine washed, temperature not to exceed 160° F (71°C) and machine dried for 15 to 20 minutes, temperature not to exceed 250° F (121°C). Pain management  -Take pain medication as prescribed; decrease the amount you use as your pain lessens  -DO not wait until you are in extreme pain to take your medication.  -Avoid alcoholic beverages while taking pain medication    Pain Medication Safety  DO:  -Read the Medication Guide   -Take your medicine exactly as prescribed   -Store your medicine away from children and in a safe place   -Flush unused medicine down the toilet   -Call your healthcare provider for medical advice about side effects. You may report side effects to FDA at 0-732-FDA-7487.   -Please be aware that many medications contain Tylenol.   We do not want you to over medicate so please read the information below as a guide. Do not take more than 4 Grams of Tylenol in a 24 hour period. (There are 1000 milligrams in one Gram)                                                                                                                                                                                                                                                Percocet contains 325 mg of Tylenol per tablet (do not take more than 12 tablets in 24 hours)  Lortab contains 500 mg of Tylenol per tablet (do not take more than 8 tablets in 24 hours)  Norco contains 325 mg of Tylenol per tablet (do not take more than 12 tablets in 24 hours). DO NOT:  -Do not give your medicine to others   -Do not take medicine unless it was prescribed for you   -Do not stop taking your medicine without talking to your healthcare provider   -Do not break, chew, crush, dissolve, or inject your medicine. If you cannot swallow your medicine whole, talk to your healthcare provider.  -Do not drink alcohol while taking this medicine  -Do not take anti-inflammatory medications or aspirin unless instructed by your     physician.

## 2018-08-21 NOTE — PROGRESS NOTES
Orthopedic Spine Progress Note  Post Op day: 1 Day Post-Op    2018 8:27 AM   Admit Date: 2018  Procedure: Procedure(s):  L2-3 OBLIQUE LATERAL INTERBODY FUSION WITH ANTOLATERAL PLATING (OARM)    Subjective:     Shruthi Santosr. has complaints of pain. Tolerating diet. No N/V. Pain Control:   Pain Assessment  Pain Scale 1: Numeric (0 - 10)  Pain Intensity 1: 10  Pain Onset 1: postop  Pain Location 1: Back  Pain Orientation 1: Lower  Pain Description 1: Burning  Pain Intervention(s) 1: Medication (see MAR)    Objective:          Physical Exam:  General:  Alert and oriented. No acute distress. Heart:  Respirations unlabored. Abdomen:   Extremities: Soft, non-tender. No evidence of cyanosis. Pulses palpable in both upper and lower extremities. Neurologic:  Musculoskeletal:  No new motor deficits. Neurovascular exam within normal limits. Sensation stable. Motor: unchanged C5-T1 and L2-S1. Nasim's sign negative in bilateral lower extremities. Calves soft, nontender upon palpation and with passive twitch. Moves both upper and lower extremities. Incision: clean, dry, and intact. No significant erythema or swelling. No active drainage noted. Vital Signs:    Blood pressure 139/82, pulse 70, temperature 99 °F (37.2 °C), resp. rate 14, height 5' 11\" (1.803 m), weight 86.2 kg (190 lb), SpO2 95 %.   Temp (24hrs), Av.2 °F (36.8 °C), Min:97.5 °F (36.4 °C), Max:99 °F (37.2 °C)      LAB:    Recent Labs      18   0433   HGB  15.3     Lab Results   Component Value Date/Time    Sodium 137 2018 04:33 AM    Potassium 3.7 2018 04:33 AM    Chloride 102 2018 04:33 AM    CO2 26 2018 04:33 AM    Glucose 101 (H) 2018 04:33 AM    BUN 11 2018 04:33 AM    Creatinine 0.87 2018 04:33 AM    Calcium 7.7 (L) 2018 04:33 AM       Intake/Output:    1901 -  0700  In: 2600 [I.V.:2600]  Out: 725 [Urine:700]    PT/OT:   Gait: Assessment:   Patient is 1 Day Post-Op s/p Procedure(s):  L2-3 OBLIQUE LATERAL INTERBODY FUSION WITH ANTOLATERAL PLATING (OARM)    Plan:     1. Continue PT/OT  2. Continue established methods of pain control-add Toradol and muscle relaxant   3. VTE Prophylaxes - TEDS &/or SCDs   4.  Discharge pending  5.  6.       Signed By: RAH James

## 2018-08-21 NOTE — PROGRESS NOTES
Occupational Therapy EVALUATION/discharge  Patient: Brigida Gonzalez (49 y.o. male)  Date: 8/21/2018  Primary Diagnosis: LUMBAGO, RADICULOPATHY, DISCITITIS  Spinal stenosis, lumbar  Procedure(s) (LRB):  L2-3 OBLIQUE LATERAL INTERBODY FUSION WITH ANTOLATERAL PLATING (OARM) (N/A) 1 Day Post-Op   Precautions: Back    ASSESSMENT:   Based on the objective data described below, the patient presents with overall Mod I-Mod A for upper body ADLs (increased assist for TLSO--wife will assist at home), Mod I-SPV for lower body ADLs, and IND-SPV for functional mobility s/p lateral fusion with plating POD #1. PTA, patient IND-Mod I, working, living with wife. Now completing functional mobility, toileting, and lower body dressing with IND-Mod I, slight knee buckling d/t drowsiness, however able to self-correct with no LOB. Wife will be home to assist the patient for 1 week, patient has 6 weeks off from work. Educated on ADL modifications, energy conservation, home safety, and car transfers, handouts provided, patient and wife verbalizing understanding. No further OT needs, safe to d/c home with wife's support. Further skilled acute occupational therapy is not indicated at this time. Discharge Recommendations: None  Further Equipment Recommendations for Discharge: None      SUBJECTIVE:   Patient stated It's not too bad, actually, I'm just tired and I don't know why. 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[facility] [address]     Chest pain 8/22/2017    Chronic back pain 8/13/2017    Chronic insomnia 8/10/2017    Difficulty in urination 8/10/2017    Endocrine disease     raman's disease    GERD (gastroesophageal reflux disease)     Hypercholesteremia     Hypogonadism male 8/10/2017    Hypothyroidism 8/10/2017    Kidney stones     multiple, has had lithotripsey as well as passed on his own    Low testosterone 8/10/2018    Nicotine vapor product user OCCASSIONAL     Opioid dependence ([facility] [address] 8/10/2017    Other ill-defined conditions(799.89)     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, on methadone    Thumb pain 8/10/2017    Vitamin D deficiency 8/10/2017     Past Surgical History:   Procedure Laterality Date    HX ORTHOPAEDIC      LEFT ROTATER CUFF REPAIR     HX OTHER SURGICAL      pylonidal cyst    HX OTHER SURGICAL      penial fracture       Prior Level of Function/Environment/Context: PTA, IND-Mod I, working, driving, living with spouse    Home Situation  Home Environment: Private residence  # Steps to Enter: 3  Rails to Enter: Yes  Hand Rails : Right  One/Two Story Residence: Two story  # of Interior Steps: 15  Interior Rails: Left  Living Alone: No  Support Systems: Family member(s)  Patient Expects to be Discharged to[de-identified] Private residence  Current DME Used/Available at Home: None  Tub or Shower Type: Tub/Shower combination    Hand dominance: Right    EXAMINATION OF PERFORMANCE DEFICITS:  Cognitive/Behavioral Status:  Neurologic State: Drowsy; Alert (initially drowsy, more alert with activity)  Orientation Level: Oriented X4  Cognition: Appropriate for age attention/concentration; Appropriate decision making; Appropriate safety awareness; Follows commands  Perception: Appears intact  Perseveration: No perseveration noted  Safety/Judgement: Awareness of environment    Skin: Appears intact    Edema: None noted in BUEs    Hearing:       Vision/Perceptual:    Tracking: Able to track stimulus in all quadrants w/o difficulty                      Acuity: Within Defined Limits         Range of Motion:  In BUEs  AROM: Within functional limits  PROM: Within functional limits                      Strength: In BUEs  Strength: Within functional limits                Coordination:  Coordination: Within functional limits  Fine Motor Skills-Upper: Left Intact; Right Intact    Gross Motor Skills-Upper: Left Intact; Right Intact    Tone & Sensation:  In BUEs  Tone: Normal  Sensation: Intact                      Balance:  Sitting: Intact  Standing: Intact    Functional Mobility and Transfers for ADLs:  Bed Mobility:  Rolling: Independent  Supine to Sit: Independent  Sit to Supine:  (sitting EOB with wife present)  Scooting: Independent    Transfers:  Sit to Stand: Independent  Stand to Sit: Independent  Toilet Transfer : Independent    ADL Assessment:  Feeding: Independent    Oral Facial Hygiene/Grooming: Modified Independent    Bathing: Supervision    Upper Body Dressing: Moderate assistance (for TLSO, wife will assist PRN at home)    Lower Body Dressing: Modified independent    Toileting: Supervision                ADL Intervention and task modifications:       Grooming  Brushing Teeth: Compensatory technique training                   Lower Body Dressing Assistance  Dressing Assistance: Modified independent  Underpants: Compensatory technique training  Pants With Elastic Waist: Compensatory technique training  Socks: Modified independent; Compensatory technique training  Leg Crossed Method Used: Yes  Position Performed: Seated edge of bed  Cues: Verbal cues provided;Visual cues provided    Toileting  Toileting Assistance: Independent  Bladder Hygiene: Independent  Bowel Hygiene: Compensatory technique training  Clothing Management: Independent  Cues: Verbal cues provided;Visual cues provided    Cognitive Retraining  Safety/Judgement: Awareness of environment    Therapeutic Exercise:  - Ambulation and functional transfers in room & bathroom     Functional Measure:  Barthel Index:    Bathin  Bladder: 10  Bowels: 10  Groomin  Dressing: 10  Feeding: 10  Mobility: 10  Stairs: 5  Toilet Use: 10  Transfer (Bed to Chair and Back): 10  Total: 80       Barthel and G-code impairment scale:  Percentage of impairment CH  0% CI  1-19% CJ  20-39% CK  40-59% CL  60-79% CM  80-99% CN  100%   Barthel Score 0-100 100 99-80 79-60 59-40 20-39 1-19   0   Barthel Score 0-20 20 17-19 13-16 9-12 5-8 1-4 0      The Barthel ADL Index: Guidelines  1. The index should be used as a record of what a patient does, not as a record of what a patient could do. 2. The main aim is to establish degree of independence from any help, physical or verbal, however minor and for whatever reason. 3. The need for supervision renders the patient not independent. 4. A patient's performance should be established using the best available evidence. Asking the patient, friends/relatives and nurses are the usual sources, but direct observation and common sense are also important. However direct testing is not needed. 5. Usually the patient's performance over the preceding 24-48 hours is important, but occasionally longer periods will be relevant. 6. Middle categories imply that the patient supplies over 50 per cent of the effort. 7. Use of aids to be independent is allowed. Tyrell Beasley., Barthel, D.W. ([id number]). Functional evaluation: the Barthel Index. [address] [facility] (14)2. Paul Smith lynn JUSTEN Freeman, Liyah Barnes., Jw Estrada., [place], [address] [place] [address] (1999). Measuring the change indisability after inpatient rehabilitation; comparison of the responsiveness of the Barthel Index and Functional [place] Measure. Journal of Neurology, Neurosurgery, and Psychiatry, 66(4), 854-703. Suhas Roy, N.J.A, AGAPITO Willson, & Alonso Heck MKORINA. (2004.) Assessment of post-stroke quality of life in cost-effectiveness studies: The usefulness of the Barthel Index and the EuroQoL-5D. Quality of Life Research, 13, 256-95         G codes: In compliance with CMSs Claims Based Outcome Reporting, the following G-code set was chosen for this patient based on their primary functional limitation being treated: The outcome measure chosen to determine the severity of the functional limitation was the Barthel Index with a score of 80/100 which was correlated with the impairment scale. ?  Self Care:     - CURRENT STATUS: CI - 1%-19% impaired, limited or restricted    - GOAL STATUS: CI - 1%-19% impaired, limited or restricted    - D/C STATUS:  CI - 1%-19% impaired, limited or restricted     Occupational Therapy Evaluation Charge Determination   History Examination Decision-Making   LOW Complexity : Brief history review  LOW Complexity : 1-3 performance deficits relating to physical, cognitive , or psychosocial skils that result in activity limitations and / or participation restrictions  LOW Complexity : No comorbidities that affect functional and no verbal or physical assistance needed to complete eval tasks       Based on the above components, the patient evaluation is determined to be of the following complexity level: LOW   Pain:  Pain Scale 1: Numeric (0 - 10)  Pain Intensity 1: 10  Pain Location 1: Back  Pain Orientation 1: Lower  Pain Description 1: Burning  Pain Intervention(s) 1: Medication (see MAR)    Activity Tolerance:   Good despite drowsiness    Please refer to the flowsheet for vital signs taken during this treatment. After treatment:   []  Patient left in no apparent distress sitting up in chair  [x]  Patient left in no apparent distress in bed  [x]  Call bell left within reach  [x]  Nursing notified  [x]  Caregiver present  []  Bed alarm activated    COMMUNICATION/EDUCATION:   Communication/Collaboration:  [x]      Home safety education was provided and the patient/caregiver indicated understanding. [x]      Patient/family have participated as able and agree with findings and recommendations. []      Patient is unable to participate in plan of care at this time.   Findings and recommendations were discussed with: Physical Therapist and Registered Nurse    Ethan Ballard OT  Time Calculation: 26 mins

## 2018-08-22 VITALS
OXYGEN SATURATION: 94 % | BODY MASS INDEX: 26.6 KG/M2 | HEART RATE: 58 BPM | WEIGHT: 190 LBS | DIASTOLIC BLOOD PRESSURE: 76 MMHG | TEMPERATURE: 98.4 F | SYSTOLIC BLOOD PRESSURE: 123 MMHG | HEIGHT: 71 IN | RESPIRATION RATE: 16 BRPM

## 2018-08-22 LAB — HGB BLD-MCNC: 15.5 G/DL (ref 12.1–17)

## 2018-08-22 PROCEDURE — 74011250636 HC RX REV CODE- 250/636: Performed by: PHYSICIAN ASSISTANT

## 2018-08-22 PROCEDURE — 74011250637 HC RX REV CODE- 250/637: Performed by: PHYSICIAN ASSISTANT

## 2018-08-22 PROCEDURE — 85018 HEMOGLOBIN: CPT | Performed by: PHYSICIAN ASSISTANT

## 2018-08-22 PROCEDURE — 36415 COLL VENOUS BLD VENIPUNCTURE: CPT | Performed by: PHYSICIAN ASSISTANT

## 2018-08-22 RX ADMIN — KETOROLAC TROMETHAMINE 30 MG: 30 INJECTION, SOLUTION INTRAMUSCULAR; INTRAVENOUS at 05:23

## 2018-08-22 RX ADMIN — Medication 10 ML: at 05:24

## 2018-08-22 RX ADMIN — HYDROCORTISONE 20 MG: 10 TABLET ORAL at 07:13

## 2018-08-22 RX ADMIN — GABAPENTIN 600 MG: 300 CAPSULE ORAL at 10:19

## 2018-08-22 RX ADMIN — ACETAMINOPHEN 1000 MG: 500 TABLET ORAL at 05:23

## 2018-08-22 RX ADMIN — OXYCODONE HYDROCHLORIDE 10 MG: 5 TABLET ORAL at 06:11

## 2018-08-22 RX ADMIN — THERA TABS 1 TABLET: TAB at 10:20

## 2018-08-22 RX ADMIN — LEVOTHYROXINE SODIUM 125 MCG: 125 TABLET ORAL at 07:13

## 2018-08-22 RX ADMIN — METHADONE HYDROCHLORIDE 60 MG: 10 TABLET ORAL at 07:13

## 2018-08-22 RX ADMIN — KETOROLAC TROMETHAMINE 30 MG: 30 INJECTION, SOLUTION INTRAMUSCULAR; INTRAVENOUS at 12:11

## 2018-08-22 RX ADMIN — OXYCODONE HYDROCHLORIDE 10 MG: 5 TABLET ORAL at 02:52

## 2018-08-22 RX ADMIN — ACETAMINOPHEN 1000 MG: 500 TABLET ORAL at 12:11

## 2018-08-22 RX ADMIN — CYCLOBENZAPRINE HYDROCHLORIDE 10 MG: 10 TABLET, FILM COATED ORAL at 05:12

## 2018-08-22 RX ADMIN — OXYCODONE HYDROCHLORIDE 10 MG: 5 TABLET ORAL at 12:53

## 2018-08-22 RX ADMIN — DOCUSATE SODIUM AND SENNOSIDES 1 TABLET: 8.6; 5 TABLET, FILM COATED ORAL at 10:20

## 2018-08-22 RX ADMIN — OXYCODONE HYDROCHLORIDE 10 MG: 5 TABLET ORAL at 10:20

## 2018-08-22 NOTE — PROGRESS NOTES
Orthopedic Spine Progress Note  Post Op day: 2 Days Post-Op    2018 7:58 AM   Admit Date: 2018  Procedure: Procedure(s):  L2-3 OBLIQUE LATERAL INTERBODY FUSION WITH ANTOLATERAL PLATING (OARM)    Subjective:     Sharla De La Cruz. appears well. He continues to complain of pain at the back. He ambulated a good distance with  physical therapy yesterday  Tolerating diet  No N/V  Voiding    Pain Control:   Pain Assessment  Pain Scale 1: Numeric (0 - 10)  Pain Intensity 1: 6  Pain Onset 1: postop  Pain Location 1: Back  Pain Orientation 1: Lower  Pain Description 1: Burning  Pain Intervention(s) 1: Medication (see MAR)    Objective:          Physical Exam:  General:  Alert and oriented. No acute distress. Heart:  Respirations unlabored. Abdomen:   Extremities: Soft, non-tender. No evidence of cyanosis. Pulses palpable in both upper and lower extremities. Neurologic:  Musculoskeletal:  No new motor deficits. Neurovascular exam within normal limits. Sensation stable. Motor: unchanged C5-T1 and L2-S1. Nasim's sign negative in bilateral lower extremities. Calves soft, nontender upon palpation and with passive twitch. Moves both upper and lower extremities. Incision: clean, dry, and intact. No significant erythema or swelling. No active drainage noted. Vital Signs:    Blood pressure 127/69, pulse 63, temperature 98.9 °F (37.2 °C), resp. rate 16, height 5' 11\" (1.803 m), weight 86.2 kg (190 lb), SpO2 97 %.   Temp (24hrs), Av.5 °F (36.9 °C), Min:98.1 °F (36.7 °C), Max:99 °F (37.2 °C)      LAB:    Recent Labs      18   0256   HGB  15.5     Lab Results   Component Value Date/Time    Sodium 137 2018 04:33 AM    Potassium 3.7 2018 04:33 AM    Chloride 102 2018 04:33 AM    CO2 26 2018 04:33 AM    Glucose 101 (H) 2018 04:33 AM    BUN 11 2018 04:33 AM    Creatinine 0.87 2018 04:33 AM    Calcium 7.7 (L) 2018 04:33 AM Intake/Output:   08/20 1901 - 08/22 0700  In: 600 [I.V.:600]  Out: 2065 [Urine:2065]    PT/OT:   Gait:  Gait  Gait Abnormalities: Antalgic  Ambulation - Level of Assistance: Independent  Distance (ft): 200 Feet (ft)  Assistive Device: Brace/Splint, Gait belt  Rail Use: Left   Stairs - Level of Assistance: Modified independent  Number of Stairs Trained: 13                 Assessment:   Patient is 2 Days Post-Op s/p Procedure(s):  L2-3 OBLIQUE LATERAL INTERBODY FUSION WITH ANTOLATERAL PLATING (OARM)    Plan:     1. Continue PT/OT  2. Continue established methods of pain control - he will go home with oxycodone 10mg Q3 hours. The NP from the methadone clinic was contacted and informed of the postoperative plan. They approved giving him the oxycodone with the understanding he is to present to the methadone clinic daily. Wife and patient understand and are in agreement with the plan  3. VTE Prophylaxes - TEDS & SCDs   4. Encouraged use of ICS  5.   Discharge to home likely today pending no changes    Signed By: Minerva Barajas PA-C

## 2018-08-22 NOTE — PROGRESS NOTES
D/C instructions printed and reviewed with pt. Pt given copy of d/c instructions. Pt verbalized understanding of information. Hard script provided for pain medicine. Dsg change demonstrated to wife at bedside. Wife verbalized understanding of teaching. Extra dsg supplies provided for home. All lines discontinued. Pt last medicated for pain at 12:53. Pt getting changed at moment. Will call for volunteer services soon to transport pt off floor.

## 2018-08-28 NOTE — DISCHARGE SUMMARY
Procedure(s):  L2-3 OBLIQUE LATERAL INTERBODY FUSION WITH ANTOLATERAL PLATING (OARM) Operative Report      Date of Surgery: 8/20/2018     Preoperative Diagnosis:  LUMBAGO, RADICULOPATHY, DISCITITIS    Postoperative Diagnosis: LUMBAGO, RADICULOPATHY, DISCITITIS    Procedure: Procedure(s):  L2-3 OBLIQUE LATERAL INTERBODY FUSION WITH ANTOLATERAL PLATING (OARM)     Surgeon: Isak Lemus MD    History and Hospital Course:  Shruthi Otero is a pleasant 47y.o. year old male who has complaints of chronic low back pain. Diagnostic testing found DDD. Having failed conservative treatment, the patient elected to undergo operative intervention. He tolerated the procedure well and was admitted post-operatively for antibiotics and pain control. On post-op day 1, the patient was doing well. He had some complaints of lower back pain but no leg pain. He was started on a clear liquid diet. He was allowed to dangle on the edge of the bed with physical therapy. PCA was continued. IV antibiotics were continued. On post-op day 2, the patient continued to progress well. He was started on a regular diet. The PCA was discontinued and the patient was started on oral pain medications. He was allowed to started getting out of bed with physical therapy. On post-op day 2, the patient was deemed ready for discharge. He was discharged to home. He was tolerating a regular diet and pain was well controlled with oral pain medications. The patient was discharged with prescriptions for pain medications. He was instructed to wear his brace at all times when out of bed. He was instructed to limit his bending, lifting and twisting. The patient will follow-up in 10-14 days for repeat x-rays and a wound check.       Signed By: Isak Lemus MD

## 2018-11-21 ENCOUNTER — HOSPITAL ENCOUNTER (OUTPATIENT)
Dept: PREADMISSION TESTING | Age: 53
Discharge: HOME | End: 2018-11-21
Payer: COMMERCIAL

## 2018-11-21 VITALS
HEART RATE: 73 BPM | TEMPERATURE: 98.6 F | SYSTOLIC BLOOD PRESSURE: 120 MMHG | DIASTOLIC BLOOD PRESSURE: 76 MMHG | BODY MASS INDEX: 28.14 KG/M2 | HEIGHT: 71 IN | WEIGHT: 201 LBS

## 2018-11-21 LAB
BASOPHILS # BLD: 0 K/UL (ref 0–0.1)
BASOPHILS NFR BLD: 1 % (ref 0–1)
DIFFERENTIAL METHOD BLD: NORMAL
EOSINOPHIL # BLD: 0.2 K/UL (ref 0–0.4)
EOSINOPHIL NFR BLD: 3 % (ref 0–7)
ERYTHROCYTE [DISTWIDTH] IN BLOOD BY AUTOMATED COUNT: 12.9 % (ref 11.5–14.5)
HCT VFR BLD AUTO: 50 % (ref 36.6–50.3)
HGB BLD-MCNC: 16.7 G/DL (ref 12.1–17)
IMM GRANULOCYTES # BLD: 0 K/UL (ref 0–0.04)
IMM GRANULOCYTES NFR BLD AUTO: 0 % (ref 0–0.5)
LYMPHOCYTES # BLD: 2.7 K/UL (ref 0.8–3.5)
LYMPHOCYTES NFR BLD: 33 % (ref 12–49)
MCH RBC QN AUTO: 32.7 PG (ref 26–34)
MCHC RBC AUTO-ENTMCNC: 33.4 G/DL (ref 30–36.5)
MCV RBC AUTO: 98 FL (ref 80–99)
MONOCYTES # BLD: 0.6 K/UL (ref 0–1)
MONOCYTES NFR BLD: 8 % (ref 5–13)
NEUTS SEG # BLD: 4.6 K/UL (ref 1.8–8)
NEUTS SEG NFR BLD: 56 % (ref 32–75)
NRBC # BLD: 0 K/UL (ref 0–0.01)
NRBC BLD-RTO: 0 PER 100 WBC
PLATELET # BLD AUTO: 292 K/UL (ref 150–400)
PMV BLD AUTO: 9.9 FL (ref 8.9–12.9)
RBC # BLD AUTO: 5.1 M/UL (ref 4.1–5.7)
WBC # BLD AUTO: 8.2 K/UL (ref 4.1–11.1)

## 2018-11-21 PROCEDURE — 36415 COLL VENOUS BLD VENIPUNCTURE: CPT

## 2018-11-21 PROCEDURE — 85025 COMPLETE CBC W/AUTO DIFF WBC: CPT

## 2018-11-21 RX ORDER — DICLOFENAC SODIUM 75 MG/1
75 TABLET, DELAYED RELEASE ORAL AS NEEDED
COMMUNITY
End: 2021-10-15 | Stop reason: ALTCHOICE

## 2018-11-21 NOTE — PERIOP NOTES
Pre-Operative Instructions    DO NOT EAT OR DRINK ANYTHING AFTER MIDNIGHT THE NIGHT BEFORE SURGERY. Patient verbalizes understanding of preoperative instructions:  Given skin prep chlorhexidine wipes-given written and verbal instructions on use.

## 2018-11-26 ENCOUNTER — ANESTHESIA EVENT (OUTPATIENT)
Dept: MEDSURG UNIT | Age: 53
End: 2018-11-26
Payer: COMMERCIAL

## 2018-11-27 ENCOUNTER — HOSPITAL ENCOUNTER (OUTPATIENT)
Age: 53
Setting detail: OUTPATIENT SURGERY
Discharge: HOME | End: 2018-11-27
Attending: OTOLARYNGOLOGY | Admitting: OTOLARYNGOLOGY
Payer: COMMERCIAL

## 2018-11-27 ENCOUNTER — ANESTHESIA (OUTPATIENT)
Dept: MEDSURG UNIT | Age: 53
End: 2018-11-27
Payer: COMMERCIAL

## 2018-11-27 VITALS
DIASTOLIC BLOOD PRESSURE: 81 MMHG | WEIGHT: 203 LBS | TEMPERATURE: 98 F | OXYGEN SATURATION: 95 % | HEART RATE: 67 BPM | BODY MASS INDEX: 28.31 KG/M2 | RESPIRATION RATE: 12 BRPM | SYSTOLIC BLOOD PRESSURE: 126 MMHG

## 2018-11-27 DIAGNOSIS — J34.2 NASAL SEPTAL DEVIATION: Primary | ICD-10-CM

## 2018-11-27 PROCEDURE — 74011000250 HC RX REV CODE- 250: Performed by: OTOLARYNGOLOGY

## 2018-11-27 PROCEDURE — 77030014153 HC WND COBLATN ENT S&N -C: Performed by: OTOLARYNGOLOGY

## 2018-11-27 PROCEDURE — 74011250636 HC RX REV CODE- 250/636

## 2018-11-27 PROCEDURE — 77030032490 HC SLV COMPR SCD KNE COVD -B: Performed by: OTOLARYNGOLOGY

## 2018-11-27 PROCEDURE — 76210000034 HC AMBSU PH I REC 0.5 TO 1 HR: Performed by: OTOLARYNGOLOGY

## 2018-11-27 PROCEDURE — 77030002888 HC SUT CHRMC J&J -A: Performed by: OTOLARYNGOLOGY

## 2018-11-27 PROCEDURE — 77030018836 HC SOL IRR NACL ICUM -A: Performed by: OTOLARYNGOLOGY

## 2018-11-27 PROCEDURE — 77030002916 HC SUT ETHLN J&J -A: Performed by: OTOLARYNGOLOGY

## 2018-11-27 PROCEDURE — 77030008684 HC TU ET CUF COVD -B: Performed by: ANESTHESIOLOGY

## 2018-11-27 PROCEDURE — 74011250636 HC RX REV CODE- 250/636: Performed by: ANESTHESIOLOGY

## 2018-11-27 PROCEDURE — 74011000250 HC RX REV CODE- 250

## 2018-11-27 PROCEDURE — 76030000003 HC AMB SURG OR TIME 1.5 TO 2: Performed by: OTOLARYNGOLOGY

## 2018-11-27 PROCEDURE — 76210000050 HC AMBSU PH II REC 0.5 TO 1 HR: Performed by: OTOLARYNGOLOGY

## 2018-11-27 PROCEDURE — 74011250637 HC RX REV CODE- 250/637: Performed by: OTOLARYNGOLOGY

## 2018-11-27 PROCEDURE — 77030020782 HC GWN BAIR PAWS FLX 3M -B

## 2018-11-27 PROCEDURE — 77030011645 HC PK NSL DOYLE MEDT -B: Performed by: OTOLARYNGOLOGY

## 2018-11-27 PROCEDURE — 77030002974 HC SUT PLN J&J -A: Performed by: OTOLARYNGOLOGY

## 2018-11-27 PROCEDURE — 77030021668 HC NEB PREFIL KT VYRM -A

## 2018-11-27 PROCEDURE — 77030026438 HC STYL ET INTUB CARD -A: Performed by: ANESTHESIOLOGY

## 2018-11-27 PROCEDURE — 76060000063 HC AMB SURG ANES 1.5 TO 2 HR: Performed by: OTOLARYNGOLOGY

## 2018-11-27 RX ORDER — SODIUM CHLORIDE, SODIUM LACTATE, POTASSIUM CHLORIDE, CALCIUM CHLORIDE 600; 310; 30; 20 MG/100ML; MG/100ML; MG/100ML; MG/100ML
75 INJECTION, SOLUTION INTRAVENOUS CONTINUOUS
Status: DISCONTINUED | OUTPATIENT
Start: 2018-11-27 | End: 2018-11-27 | Stop reason: HOSPADM

## 2018-11-27 RX ORDER — MORPHINE SULFATE 10 MG/ML
2 INJECTION, SOLUTION INTRAMUSCULAR; INTRAVENOUS
Status: DISCONTINUED | OUTPATIENT
Start: 2018-11-27 | End: 2018-11-27 | Stop reason: HOSPADM

## 2018-11-27 RX ORDER — ALBUTEROL SULFATE 0.83 MG/ML
2.5 SOLUTION RESPIRATORY (INHALATION) AS NEEDED
Status: DISCONTINUED | OUTPATIENT
Start: 2018-11-27 | End: 2018-11-27 | Stop reason: HOSPADM

## 2018-11-27 RX ORDER — EPHEDRINE SULFATE 50 MG/ML
5 INJECTION, SOLUTION INTRAVENOUS AS NEEDED
Status: DISCONTINUED | OUTPATIENT
Start: 2018-11-27 | End: 2018-11-27 | Stop reason: HOSPADM

## 2018-11-27 RX ORDER — FENTANYL CITRATE 50 UG/ML
50 INJECTION, SOLUTION INTRAMUSCULAR; INTRAVENOUS AS NEEDED
Status: DISCONTINUED | OUTPATIENT
Start: 2018-11-27 | End: 2018-11-27 | Stop reason: HOSPADM

## 2018-11-27 RX ORDER — MIDAZOLAM HYDROCHLORIDE 1 MG/ML
0.5 INJECTION, SOLUTION INTRAMUSCULAR; INTRAVENOUS
Status: DISCONTINUED | OUTPATIENT
Start: 2018-11-27 | End: 2018-11-27 | Stop reason: HOSPADM

## 2018-11-27 RX ORDER — PROCHLORPERAZINE EDISYLATE 5 MG/ML
10 INJECTION INTRAMUSCULAR; INTRAVENOUS
Status: DISCONTINUED | OUTPATIENT
Start: 2018-11-27 | End: 2018-11-27 | Stop reason: HOSPADM

## 2018-11-27 RX ORDER — CEPHALEXIN 500 MG/1
500 CAPSULE ORAL 4 TIMES DAILY
Qty: 20 CAP | Refills: 0 | Status: SHIPPED | OUTPATIENT
Start: 2018-11-27 | End: 2018-12-02

## 2018-11-27 RX ORDER — MIDAZOLAM HYDROCHLORIDE 1 MG/ML
1 INJECTION, SOLUTION INTRAMUSCULAR; INTRAVENOUS AS NEEDED
Status: DISCONTINUED | OUTPATIENT
Start: 2018-11-27 | End: 2018-11-27 | Stop reason: HOSPADM

## 2018-11-27 RX ORDER — EPHEDRINE SULFATE 50 MG/ML
INJECTION, SOLUTION INTRAVENOUS AS NEEDED
Status: DISCONTINUED | OUTPATIENT
Start: 2018-11-27 | End: 2018-11-27 | Stop reason: HOSPADM

## 2018-11-27 RX ORDER — SODIUM CHLORIDE 9 MG/ML
25 INJECTION, SOLUTION INTRAVENOUS CONTINUOUS
Status: DISCONTINUED | OUTPATIENT
Start: 2018-11-27 | End: 2018-11-27 | Stop reason: HOSPADM

## 2018-11-27 RX ORDER — PROPOFOL 10 MG/ML
INJECTION, EMULSION INTRAVENOUS AS NEEDED
Status: DISCONTINUED | OUTPATIENT
Start: 2018-11-27 | End: 2018-11-27 | Stop reason: HOSPADM

## 2018-11-27 RX ORDER — DEXMEDETOMIDINE HYDROCHLORIDE 4 UG/ML
INJECTION, SOLUTION INTRAVENOUS AS NEEDED
Status: DISCONTINUED | OUTPATIENT
Start: 2018-11-27 | End: 2018-11-27 | Stop reason: HOSPADM

## 2018-11-27 RX ORDER — HYDROCODONE BITARTRATE AND ACETAMINOPHEN 5; 325 MG/1; MG/1
1 TABLET ORAL AS NEEDED
Status: DISCONTINUED | OUTPATIENT
Start: 2018-11-27 | End: 2018-11-27 | Stop reason: HOSPADM

## 2018-11-27 RX ORDER — OXYMETAZOLINE HCL 0.05 %
SPRAY, NON-AEROSOL (ML) NASAL AS NEEDED
Status: DISCONTINUED | OUTPATIENT
Start: 2018-11-27 | End: 2018-11-27 | Stop reason: HOSPADM

## 2018-11-27 RX ORDER — GLYCOPYRROLATE 0.2 MG/ML
0.2 INJECTION INTRAMUSCULAR; INTRAVENOUS
Status: DISCONTINUED | OUTPATIENT
Start: 2018-11-27 | End: 2018-11-27 | Stop reason: HOSPADM

## 2018-11-27 RX ORDER — HYDROCODONE BITARTRATE AND ACETAMINOPHEN 7.5; 325 MG/1; MG/1
1-2 TABLET ORAL
Status: DISCONTINUED | OUTPATIENT
Start: 2018-11-27 | End: 2018-11-27 | Stop reason: HOSPADM

## 2018-11-27 RX ORDER — ROPIVACAINE HYDROCHLORIDE 5 MG/ML
30 INJECTION, SOLUTION EPIDURAL; INFILTRATION; PERINEURAL AS NEEDED
Status: DISCONTINUED | OUTPATIENT
Start: 2018-11-27 | End: 2018-11-27 | Stop reason: HOSPADM

## 2018-11-27 RX ORDER — SUCCINYLCHOLINE CHLORIDE 20 MG/ML
INJECTION INTRAMUSCULAR; INTRAVENOUS AS NEEDED
Status: DISCONTINUED | OUTPATIENT
Start: 2018-11-27 | End: 2018-11-27 | Stop reason: HOSPADM

## 2018-11-27 RX ORDER — MIDAZOLAM HYDROCHLORIDE 1 MG/ML
INJECTION, SOLUTION INTRAMUSCULAR; INTRAVENOUS AS NEEDED
Status: DISCONTINUED | OUTPATIENT
Start: 2018-11-27 | End: 2018-11-27 | Stop reason: HOSPADM

## 2018-11-27 RX ORDER — LIDOCAINE HYDROCHLORIDE AND EPINEPHRINE 10; 10 MG/ML; UG/ML
INJECTION, SOLUTION INFILTRATION; PERINEURAL AS NEEDED
Status: DISCONTINUED | OUTPATIENT
Start: 2018-11-27 | End: 2018-11-27 | Stop reason: HOSPADM

## 2018-11-27 RX ORDER — SODIUM CHLORIDE, SODIUM LACTATE, POTASSIUM CHLORIDE, CALCIUM CHLORIDE 600; 310; 30; 20 MG/100ML; MG/100ML; MG/100ML; MG/100ML
1000 INJECTION, SOLUTION INTRAVENOUS CONTINUOUS
Status: DISCONTINUED | OUTPATIENT
Start: 2018-11-27 | End: 2018-11-27 | Stop reason: HOSPADM

## 2018-11-27 RX ORDER — ROCURONIUM BROMIDE 10 MG/ML
INJECTION, SOLUTION INTRAVENOUS AS NEEDED
Status: DISCONTINUED | OUTPATIENT
Start: 2018-11-27 | End: 2018-11-27 | Stop reason: HOSPADM

## 2018-11-27 RX ORDER — DEXAMETHASONE SODIUM PHOSPHATE 4 MG/ML
INJECTION, SOLUTION INTRA-ARTICULAR; INTRALESIONAL; INTRAMUSCULAR; INTRAVENOUS; SOFT TISSUE AS NEEDED
Status: DISCONTINUED | OUTPATIENT
Start: 2018-11-27 | End: 2018-11-27 | Stop reason: HOSPADM

## 2018-11-27 RX ORDER — LIDOCAINE HYDROCHLORIDE 10 MG/ML
0.1 INJECTION, SOLUTION EPIDURAL; INFILTRATION; INTRACAUDAL; PERINEURAL AS NEEDED
Status: DISCONTINUED | OUTPATIENT
Start: 2018-11-27 | End: 2018-11-27 | Stop reason: HOSPADM

## 2018-11-27 RX ORDER — FENTANYL CITRATE 50 UG/ML
INJECTION, SOLUTION INTRAMUSCULAR; INTRAVENOUS AS NEEDED
Status: DISCONTINUED | OUTPATIENT
Start: 2018-11-27 | End: 2018-11-27 | Stop reason: HOSPADM

## 2018-11-27 RX ORDER — LIDOCAINE HYDROCHLORIDE 20 MG/ML
INJECTION, SOLUTION EPIDURAL; INFILTRATION; INTRACAUDAL; PERINEURAL AS NEEDED
Status: DISCONTINUED | OUTPATIENT
Start: 2018-11-27 | End: 2018-11-27 | Stop reason: HOSPADM

## 2018-11-27 RX ORDER — CEFAZOLIN SODIUM IN 0.9 % NACL 2 G/100 ML
PLASTIC BAG, INJECTION (ML) INTRAVENOUS AS NEEDED
Status: DISCONTINUED | OUTPATIENT
Start: 2018-11-27 | End: 2018-11-27 | Stop reason: HOSPADM

## 2018-11-27 RX ORDER — FENTANYL CITRATE 50 UG/ML
25 INJECTION, SOLUTION INTRAMUSCULAR; INTRAVENOUS
Status: DISCONTINUED | OUTPATIENT
Start: 2018-11-27 | End: 2018-11-27 | Stop reason: HOSPADM

## 2018-11-27 RX ORDER — PHENYLEPHRINE HCL IN 0.9% NACL 0.4MG/10ML
SYRINGE (ML) INTRAVENOUS AS NEEDED
Status: DISCONTINUED | OUTPATIENT
Start: 2018-11-27 | End: 2018-11-27 | Stop reason: HOSPADM

## 2018-11-27 RX ORDER — ONDANSETRON 2 MG/ML
4 INJECTION INTRAMUSCULAR; INTRAVENOUS
Status: DISCONTINUED | OUTPATIENT
Start: 2018-11-27 | End: 2018-11-27 | Stop reason: HOSPADM

## 2018-11-27 RX ORDER — OXYMETAZOLINE HCL 0.05 %
2 SPRAY, NON-AEROSOL (ML) NASAL
Status: DISCONTINUED | OUTPATIENT
Start: 2018-11-27 | End: 2018-11-27 | Stop reason: HOSPADM

## 2018-11-27 RX ORDER — PROCHLORPERAZINE EDISYLATE 5 MG/ML
10 INJECTION INTRAMUSCULAR; INTRAVENOUS ONCE
Status: DISCONTINUED | OUTPATIENT
Start: 2018-11-27 | End: 2018-11-27

## 2018-11-27 RX ORDER — HYDROCODONE BITARTRATE AND ACETAMINOPHEN 5; 325 MG/1; MG/1
1-2 TABLET ORAL
Qty: 25 TAB | Refills: 0 | Status: SHIPPED | OUTPATIENT
Start: 2018-11-27 | End: 2018-12-05 | Stop reason: ALTCHOICE

## 2018-11-27 RX ORDER — ONDANSETRON 2 MG/ML
4 INJECTION INTRAMUSCULAR; INTRAVENOUS AS NEEDED
Status: DISCONTINUED | OUTPATIENT
Start: 2018-11-27 | End: 2018-11-27 | Stop reason: HOSPADM

## 2018-11-27 RX ORDER — ONDANSETRON 4 MG/1
4 TABLET, ORALLY DISINTEGRATING ORAL
Qty: 10 TAB | Refills: 2 | Status: SHIPPED | OUTPATIENT
Start: 2018-11-27 | End: 2018-12-05 | Stop reason: ALTCHOICE

## 2018-11-27 RX ORDER — ONDANSETRON 2 MG/ML
INJECTION INTRAMUSCULAR; INTRAVENOUS AS NEEDED
Status: DISCONTINUED | OUTPATIENT
Start: 2018-11-27 | End: 2018-11-27 | Stop reason: HOSPADM

## 2018-11-27 RX ADMIN — ONDANSETRON 4 MG: 2 INJECTION INTRAMUSCULAR; INTRAVENOUS at 09:56

## 2018-11-27 RX ADMIN — Medication 80 MCG: at 10:09

## 2018-11-27 RX ADMIN — FENTANYL CITRATE 50 MCG: 50 INJECTION, SOLUTION INTRAMUSCULAR; INTRAVENOUS at 09:24

## 2018-11-27 RX ADMIN — SODIUM CHLORIDE, SODIUM LACTATE, POTASSIUM CHLORIDE, AND CALCIUM CHLORIDE 1000 ML: 600; 310; 30; 20 INJECTION, SOLUTION INTRAVENOUS at 08:59

## 2018-11-27 RX ADMIN — DEXMEDETOMIDINE HYDROCHLORIDE 4 MCG: 4 INJECTION, SOLUTION INTRAVENOUS at 10:31

## 2018-11-27 RX ADMIN — DEXMEDETOMIDINE HYDROCHLORIDE 4 MCG: 4 INJECTION, SOLUTION INTRAVENOUS at 10:40

## 2018-11-27 RX ADMIN — Medication 80 MCG: at 09:53

## 2018-11-27 RX ADMIN — OXYMETAZOLINE HYDROCHLORIDE 2 SPRAY: 5 SPRAY NASAL at 08:48

## 2018-11-27 RX ADMIN — ROCURONIUM BROMIDE 5 MG: 10 INJECTION, SOLUTION INTRAVENOUS at 09:24

## 2018-11-27 RX ADMIN — DEXMEDETOMIDINE HYDROCHLORIDE 4 MCG: 4 INJECTION, SOLUTION INTRAVENOUS at 10:21

## 2018-11-27 RX ADMIN — Medication 80 MCG: at 09:44

## 2018-11-27 RX ADMIN — LIDOCAINE HYDROCHLORIDE 80 MG: 20 INJECTION, SOLUTION EPIDURAL; INFILTRATION; INTRACAUDAL; PERINEURAL at 09:24

## 2018-11-27 RX ADMIN — Medication 80 MCG: at 09:59

## 2018-11-27 RX ADMIN — EPHEDRINE SULFATE 10 MG: 50 INJECTION, SOLUTION INTRAVENOUS at 10:09

## 2018-11-27 RX ADMIN — Medication 80 MCG: at 09:56

## 2018-11-27 RX ADMIN — SUCCINYLCHOLINE CHLORIDE 140 MG: 20 INJECTION INTRAMUSCULAR; INTRAVENOUS at 09:25

## 2018-11-27 RX ADMIN — PROPOFOL 180 MG: 10 INJECTION, EMULSION INTRAVENOUS at 09:24

## 2018-11-27 RX ADMIN — MIDAZOLAM HYDROCHLORIDE 2 MG: 1 INJECTION, SOLUTION INTRAMUSCULAR; INTRAVENOUS at 09:15

## 2018-11-27 RX ADMIN — DEXAMETHASONE SODIUM PHOSPHATE 8 MG: 4 INJECTION, SOLUTION INTRA-ARTICULAR; INTRALESIONAL; INTRAMUSCULAR; INTRAVENOUS; SOFT TISSUE at 09:49

## 2018-11-27 RX ADMIN — EPHEDRINE SULFATE 5 MG: 50 INJECTION, SOLUTION INTRAVENOUS at 09:59

## 2018-11-27 RX ADMIN — Medication 2 G: at 09:30

## 2018-11-27 RX ADMIN — Medication 80 MCG: at 10:23

## 2018-11-27 RX ADMIN — Medication 80 MCG: at 09:48

## 2018-11-27 NOTE — OP NOTES
NAME: Pat Raza MRN: [id number]  DATE: 11/27/2018      PREOPERATIVE DIAGNOSIS: DEVIATED NASAL SEPTUM  POSTOPERATIVE DIAGNOSIS: DEVIATED NASAL SEPTUM    PROCEDURES PERFORMED:  Septoplasty, inferior turbinate submucosal resection    SURGEON: Giorgio Solares MD    ASSISTANT: None. INDICATIONS FOR SURGERY:  Chronic sinusitis    Complications: none    Anesthesia: General    Specimens: none    Drains: none  Implants:cruz splints bilat. Findings:Septum was straightened and inferior turbinates were reduced by greater than 50%. PROCEDURE DETAILS:   After informed consent was obtained from the patient,  the patient was identified, brought to the operating room, and placed  supine on the operating table. General endotracheal anesthesia was given. The nasal cavities were decongested with topical Afrin, which was placed on  Neuro Patties in the left and right nasal cavity. 10 cc of 1% lidocaine with 1:100,0000 epinephrine was injected into the left and right septum in a subperichondrial fashion. The nose was repacked. The patient was then  prepped and draped in the standard surgical fashion. Next a standard Tobin Deck incision was made in the left septum and mucosal flaps were elevated on the left side in a subperichondrial fashion. The flap was elevated over the bony septum as well. Next, an incision was made through the caudal septum using a nate knife, leaving 1.5 cm of caudal septum for support. Next a right mucosal flap was raised in a subperichondrial plane. The flap was elevated over the bony septum as well. The septum was significantly deviated to the left and there was a large inferior septal spur on the left. An incision was made through the cartilaginous septum inferiorly and a strip of cartilage removed from the maxillary crest.  The posterior edge of the cartilaginous septum was disarticulated from the bony septum.   The bony spur was carefully dissected off the maxillary crest using an osteotome. The Eldon-campos forceps were used to resect the deviated portion of the bony septum. The mucosal flaps were replaced, and the deviation had been corrected. The wound was irrigated out with saline and the harvested cartilage was morselized and replaced in the nasal cavity between the mucosal flaps. The incision was closed with 4.0 chromic and a 4.0 plain gut was used in a mattress fashion to reapproximate the mucosal flaps. Again, my attention was turned back to the right side. The patient had a  very large inferior turbinate, and so the Coblation device wand was used to  make a very small, 2-mm incision in the anterior middle turbinate, and then  3 passes were made bluntly along the inferior turbinate submucosally. The  bone and soft tissue were ablated using the Coblation device along these 3  tracts. Special attention was then made at ablating the very anterior head  of the inferior turbinate. Next, the inferior turbinate was outfractured  using a Fong elevator. This reduced the volume of the inferior  turbinate by approximately 50%. Next, the procedure was done on the left side, where the inferior turbinate  had a small incision made with the Coblation device. Three passes were  made submucosally along the bone. The bone and submucosal area were  ablated using the Coblation device on the setting of 6 in these 3 passes,  and then additional Coblation was done on the head of the inferior  turbinate, and then the turbinate was outfractured using a Fong  elevator. This reduced the volume of the turbinate by 50%. Next, the nasal cavities were irrigated with copious amounts of normal   saline bilaterally. Bacitracin coated cruz splints were placed in the left and right nasal cavity and secured using a 2.0 nylon stitch.  The patient was turned back over to   the Anesthesia staff, awakened in the operating room and transferred to the   recovery room, awake and alert, and in very good condition.           EBL: 10ml        Emmett Connell MD  11/27/2018  10:36 AM

## 2018-11-27 NOTE — ROUTINE PROCESS
Patient: Pat Raza MRN: [id number]  SSN: [id number]   YOB: 1965  Age: 47 y.o. Sex: male     Patient is status post Procedure(s):  RESECTION OF TURBINATE BONES, REPAIR NASAL SEPTUM. Surgeon(s) and Role:     Farhana Rick MD - Primary    Local/Dose/Irrigation:  SEE MAR                  Peripheral IV 11/27/18 Right Arm (Active)   Site Assessment Clean, dry, & intact 11/27/2018  8:58 AM   Phlebitis Assessment 0 11/27/2018  8:58 AM   Infiltration Assessment 0 11/27/2018  8:58 AM   Dressing Status Clean, dry, & intact 11/27/2018  8:58 AM   Dressing Type Tape;Transparent 11/27/2018  8:58 AM   Hub Color/Line Status Pink; Infusing 11/27/2018  8:58 AM            Airway - Endotracheal Tube 11/27/18 Oral (Active)                   Dressing/Packing:  Wound Nose-DRESSING TYPE: Open to air (11/27/18 1021)  Splint/Cast: Wound Nose-SPLINT TYPE/MATERIAL: (RAY SPLINT)]    Other:

## 2018-11-27 NOTE — ANESTHESIA POSTPROCEDURE EVALUATION
Post-Anesthesia Evaluation and Assessment    Patient: Mery Hair MRN: [id number]  SSN: [id number]    YOB: 1965  Age: 47 y.o. Sex: male      I have evaluated the patient and they are stable and ready for discharge from the PACU. Cardiovascular Function/Vital Signs  Visit Vitals  /85 (BP 1 Location: Right arm, BP Patient Position: At rest)   Pulse 66   Temp 36 °C (96.8 °F)   Resp 12   Wt 92.1 kg (203 lb)   SpO2 95%   BMI 28.31 kg/m²       Patient is status post General anesthesia for Procedure(s):  RESECTION OF TURBINATE BONES, REPAIR NASAL SEPTUM. Nausea/Vomiting: None    Postoperative hydration reviewed and adequate. Pain:  Pain Scale 1: Numeric (0 - 10) (11/27/18 1125)  Pain Intensity 1: 0 (11/27/18 1125)   Managed    Neurological Status:   Neuro (WDL): Within Defined Limits (11/27/18 1125)  Neuro  LUE Motor Response: Purposeful (11/27/18 1125)  LLE Motor Response: Purposeful (11/27/18 1125)  RUE Motor Response: Purposeful (11/27/18 1125)  RLE Motor Response: Purposeful (11/27/18 1125)   At baseline    Mental Status, Level of Consciousness: Alert and  oriented to person, place, and time    Pulmonary Status:   O2 Device: Room air (11/27/18 1125)   Adequate oxygenation and airway patent    Complications related to anesthesia: None    Post-anesthesia assessment completed.  No concerns    Signed By: Raymon Munoz MD     November 27, 2018              Procedure(s):  RESECTION OF TURBINATE BONES, REPAIR NASAL SEPTUM.    <BSHSIANPOST>    Visit Vitals  /85 (BP 1 Location: Right arm, BP Patient Position: At rest)   Pulse 66   Temp 36 °C (96.8 °F)   Resp 12   Wt 92.1 kg (203 lb)   SpO2 95%   BMI 28.31 kg/m²

## 2018-11-27 NOTE — ANESTHESIA PREPROCEDURE EVALUATION
Anesthesia EvaluationAnesthetic History No history of anesthetic complications Review of Systems / Medical History Patient summary reviewed, nursing notes reviewed and pertinent labs reviewed Pulmonary Smoker Neuro/Psych Within defined limits Cardiovascular Within defined limits GI/Hepatic/Renal 
  
GERD Endo/Other Hypothyroidism Other Findings Comments: Opioid dependence Shirley's disease Physical Exam 
 
Airway Mallampati: II 
TM Distance: > 6 cm Neck ROM: normal range of motion Mouth opening: Normal 
 
 Cardiovascular Regular rate and rhythm,  S1 and S2 normal,  no murmur, click, rub, or gallop Dental 
No notable dental hx Pulmonary Breath sounds clear to auscultation Abdominal 
GI exam deferred Other Findings Anesthetic Plan ASA: 2 Anesthesia type: general 
 
 
 
 
Induction: Intravenous Anesthetic plan and risks discussed with: Patient Anesthesia Plan

## 2018-11-27 NOTE — DISCHARGE INSTRUCTIONS
[address] [place] [address]    The following instructions are designed to help you recover from surgery as easily as possible. Taking care of yourself can prevent complications. It is very important that you read this sheet often and follow the instructions carefully while you are at home. Your doctor or nurse will be happy to answer any questions. DIET:    You may resume your normal diet. It is important to remember that good overall diet and health promotes healing. ACTIVITY RESTRICTIONS:    The following guidelines should be followed until your doctor tells you otherwise:    Do not lift anything over five pounds. Do not bend over. Avoid doing things like tying your shoes or picking things up off the floor. Do not do heavy exercise or play contact sports. Do not strain for a bowel movement. If you are constipated, take a stool softener or a gentle laxative. Go back to work or school only when your doctor says you can. Do not blow your nose and if you have to sneeze, sneeze with your mouth open. HOW TO TAKE CARE OF YOUR NOSE AND SINUSES:    You may have some bloody thick mucus drainage from the nose. It will gradually go away. Applying a small gauze dressing beneath your nose will help absorb drainage. After a few days you will probably not need to use the dressing any longer. If you have a bloody saturated gauze more than once an hour, call the office. You may have some swelling of your nose, upper lip, cheeks or around your eyes for several days after surgery. This swelling will gradually go away. You can help to reduce it by sleeping with your head elevated on two or three pillows and by staying in an upright position as much as possible during your waking hours. Avoid smoke, dust, fumes or anything else that might irritate your nose.     Protect yourself from any unexpected injury to your nose or face, such as being hit by small children or a restless bedmate. Do not put anything into your nose. This includes your fingers, cotton-tipped applicators, tissues or handkerchiefs. Any of these items might accidentally injure your nose. You may find that a cool mist room humidifier is helpful in decreasing the amount of dryness in your nose and mouth. After your surgery you should use a nasal spray such as [employer] or [employer]. Use 4 sprays in each nostril every two hours while awake to help prevent crusts from forming in your nose. MEDICINES TO AVOID:     DO NOT TAKE ANY ASPIRIN-CONTAINING MEDICATIONS OR IBUPROFEN MEDICINES (SUCH AS ADVIL OR NUPRIN). These medications can cause an increase in bleeding. If you think you may be taking a medicine that contains aspirin, ask your pharmacist.    RETURN APPOINTMENT:    You will have a follow-up appointment with your doctor. At this time your nose will be gently and carefully examined and any crusts that have formed will be removed. The removal of crusts may be somewhat uncomfortable for you since your nose is likely to still be tender from the surgery. Because of this, the doctor will spray your nose with special numbing medicine before removing the crusts. NOTIFY YOUR DOCTOR IF YOU HAVE:    A sudden increase in the amount of bleeding from the nose. A fever above 101 degrees F, which persists despite increasing the amount of fluids you take. A person with fever should try to drink approximately one cup of fluid each waking hour. Persistent sharp pain that is not relieved by the pain medication you receive. Increased swelling or redness of your nose. If you have any questions or concerns following your surgery do not hesitate to contact our office at     [address] [place] [address] office hours are 8:00 a.m. to 4:30 p.m. You should be able to reach us after hours by calling the regular office number.   If for some reason you are not able to reach our [address] [place] [address] service through this main number you may call them directly at [phone]. DISCHARGE SUMMARY from Nurse    PATIENT INSTRUCTIONS:    After general anesthesia or intravenous sedation, for 24 hours or while taking prescription Narcotics:  · Limit your activities  · Do not drive and operate hazardous machinery  · Do not make important personal or business decisions  · Do  not drink alcoholic beverages  · If you have not urinated within 8 hours after discharge, please contact your surgeon on call. Report the following to your surgeon:  · Excessive pain, swelling, redness or odor of or around the surgical area  · Temperature over 100.5  · Nausea and vomiting lasting longer than 4 hours or if unable to take medications  · Any signs of decreased circulation or nerve impairment to extremity: change in color, persistent  numbness, tingling, coldness or increase pain  · Any questions    What to do at Home:  Recommended activity:  As advised above    If you experience any of the above symptoms, please follow up with Dr Mitali Landon. *  Please give a list of your current medications to your Primary Care Provider. *  Please update this list whenever your medications are discontinued, doses are      changed, or new medications (including over-the-counter products) are added. *  Please carry medication information at all times in case of emergency situations. These are general instructions for a healthy lifestyle:    No smoking/ No tobacco products/ Avoid exposure to second hand smoke  Surgeon General's Warning:  Quitting smoking now greatly reduces serious risk to your health.     Obesity, smoking, and sedentary lifestyle greatly increases your risk for illness    A healthy diet, regular physical exercise & weight monitoring are important for maintaining a healthy lifestyle    You may be retaining fluid if you have a history of heart failure or if you experience any of the following symptoms:  Weight gain of 3 pounds or more overnight or 5 pounds in a week, increased swelling in our hands or feet or shortness of breath while lying flat in bed. Please call your doctor as soon as you notice any of these symptoms; do not wait until your next office visit. Recognize signs and symptoms of STROKE:    F-face looks uneven    A-arms unable to move or move unevenly    S-speech slurred or non-existent    T-time-call 911 as soon as signs and symptoms begin-DO NOT go       Back to bed or wait to see if you get better-TIME IS BRAIN. Warning Signs of HEART ATTACK     Call 911 if you have these symptoms:   Chest discomfort. Most heart attacks involve discomfort in the center of the chest that lasts more than a few minutes, or that goes away and comes back. It can feel like uncomfortable pressure, squeezing, fullness, or pain.  Discomfort in other areas of the upper body. Symptoms can include pain or discomfort in one or both arms, the back, neck, jaw, or stomach.  Shortness of breath with or without chest discomfort.  Other signs may include breaking out in a cold sweat, nausea, or lightheadedness. Don't wait more than five minutes to call 911 - MINUTES MATTER! Fast action can save your life. Calling 911 is almost always the fastest way to get lifesaving treatment. Emergency Medical Services staff can begin treatment when they arrive -- up to an hour sooner than if someone gets to the hospital by car. The discharge information has been reviewed with the patient and caregiver. The patient and caregiver verbalized understanding. Discharge medications reviewed with the patient and caregiver and appropriate educational materials and side effects teaching were provided.   ___________________________________________________________________________________________________________________________________

## 2018-11-27 NOTE — H&P
[facility]      The history and physical is reviewed by me and updated today. There are no changes from the previous history and physical.  This file should be an external document in the notes section or could be in the media portion of the chart. The risks of the procedure including, bleeding, infection, problems with anesthesia, need for further procedures, and death have been discussed with the patient. We also discussed the fact that symptoms may not improve or potentially could worsen. Also discussed the alternatives of continued medical management. The patient desires to proceed.     Jaime Melendez MD

## 2018-12-05 ENCOUNTER — OFFICE VISIT (OUTPATIENT)
Dept: INTERNAL MEDICINE CLINIC | Age: 53
End: 2018-12-05

## 2018-12-05 VITALS
HEIGHT: 71 IN | DIASTOLIC BLOOD PRESSURE: 80 MMHG | WEIGHT: 207 LBS | SYSTOLIC BLOOD PRESSURE: 122 MMHG | BODY MASS INDEX: 28.98 KG/M2 | HEART RATE: 87 BPM | OXYGEN SATURATION: 95 %

## 2018-12-05 DIAGNOSIS — F41.0 SEVERE ANXIETY WITH PANIC: Primary | ICD-10-CM

## 2018-12-05 RX ORDER — CITALOPRAM 20 MG/1
20 TABLET, FILM COATED ORAL DAILY
Qty: 30 TAB | Refills: 1 | Status: SHIPPED | OUTPATIENT
Start: 2018-12-05 | End: 2021-10-15 | Stop reason: ALTCHOICE

## 2018-12-05 NOTE — PATIENT INSTRUCTIONS
Panic Attacks: Care Instructions  Your Care Instructions    During a panic attack, you may have a feeling of intense fear or terror, trouble breathing, chest pain or tightness, heartbeat changes, dizziness, sweating, and shaking. A panic attack starts suddenly and usually lasts from 5 to 20 minutes but may last even longer. You have the most anxiety about 10 minutes after the attack starts. An attack can begin with a stressful event, or it can happen without a cause. Although panic attacks can cause scary symptoms, you can learn to manage them with self-care, counseling, and medicine. Follow-up care is a key part of your treatment and safety. Be sure to make and go to all appointments, and call your doctor if you are having problems. It's also a good idea to know your test results and keep a list of the medicines you take. How can you care for yourself at home? · Take your medicine exactly as directed. Call your doctor if you think you are having a problem with your medicine. · Go to your counseling sessions and follow-up appointments. · Recognize and accept your anxiety. Then, when you are in a situation that makes you anxious, say to yourself, \"This is not an emergency. I feel uncomfortable, but I am not in danger. I can keep going even if I feel anxious. \"  · Be kind to your body:  ? Relieve tension with exercise or a massage. ? Get enough rest.  ? Avoid alcohol, caffeine, nicotine, and illegal drugs. They can increase your anxiety level, cause sleep problems, or trigger a panic attack. ? Learn and do relaxation techniques. See below for more about these techniques. · Engage your mind. Get out and do something you enjoy. Go to a funny movie, or take a walk or hike. Plan your day. Having too much or too little to do can make you anxious. · Keep a record of your symptoms. Discuss your fears with a good friend or family member, or join a support group for people with similar problems.  Talking to others sometimes relieves stress. · Get involved in social groups, or volunteer to help others. Being alone sometimes makes things seem worse than they are. · Get at least 30 minutes of exercise on most days of the week to relieve stress. Walking is a good choice. You also may want to do other activities, such as running, swimming, cycling, or playing tennis or team sports. Relaxation techniques  Do relaxation exercises for 10 to 20 minutes a day. You can play soothing, relaxing music while you do them, if you wish. · Tell others in your house that you are going to do your relaxation exercises. Ask them not to disturb you. · Find a comfortable place, away from all distractions and noise. · Lie down on your back, or sit with your back straight. · Focus on your breathing. Make it slow and steady. · Breathe in through your nose. Breathe out through either your nose or mouth. · Breathe deeply, filling up the area between your navel and your rib cage. Breathe so that your belly goes up and down. · Do not hold your breath. · Breathe like this for 5 to 10 minutes. Notice the feeling of calmness throughout your whole body. As you continue to breathe slowly and deeply, relax by doing the following for another 5 to 10 minutes:  · Tighten and relax each muscle group in your body. You can begin at your toes and work your way up to your head. · Imagine your muscle groups relaxing and becoming heavy. · Empty your mind of all thoughts. · Let yourself relax more and more deeply. · Become aware of the state of calmness that surrounds you. · When your relaxation time is over, you can bring yourself back to alertness by moving your fingers and toes and then your hands and feet and then stretching and moving your entire body. Sometimes people fall asleep during relaxation, but they usually wake up shortly afterward.   · Always give yourself time to return to full alertness before you drive a car or do anything that might cause an accident if you are not fully alert. Never play a relaxation tape while driving a car. When should you call for help? Call 911 anytime you think you may need emergency care. For example, call if:    · You feel you cannot stop from hurting yourself or someone else.    Watch closely for changes in your health, and be sure to contact your doctor if:    · Your panic attacks get worse.     · You have new or different anxiety.     · You are not getting better as expected. Where can you learn more? Go to http://chuck-edgardo.info/. Enter H601 in the search box to learn more about \"Panic Attacks: Care Instructions. \"  Current as of: December 7, 2017  Content Version: 11.8  © 4095-3111 [employer]. Care instructions adapted under license by [employer] (which disclaims liability or warranty for this information). If you have questions about a medical condition or this instruction, always ask your healthcare professional. [place] [address] any warranty or liability for your use of this information.

## 2018-12-05 NOTE — PROGRESS NOTES
This note will not be viewable in [address] Subjective:     Mr. Gabriele Salvador presents to the office today with complaints of anxiety and panic. 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 At that time he would start to feel nervous, tremulous and start to sweat. He would get out of the situation, symptoms would improve and he would be able to go back to work. Gradually over time this is worsened in the last 6 months or so he has had a couple of surgeries and he has been out of work. He is started to note that these are occurring without any provocation. The patient will suddenly start to feel anxious, tremulous, nervous and have sweats. Occasionally he will have palpitations but never syncope. The patient denies any history of depression. The patient has had problems with opiates in the past and is currently on a methadone program.  He denies any family history of anxiety or depression.     Past Medical History:   Diagnosis Date    Stevens's disease ([facility] [address]     Arthritis     Chest pain 8/22/2017    Chronic back pain 8/13/2017    Chronic insomnia 8/10/2017    Difficulty in urination 8/10/2017    Endocrine disease     raman's disease    GERD (gastroesophageal reflux disease)     Hypercholesteremia     Hypogonadism male 8/10/2017    Hypothyroidism 8/10/2017    ADDISONS DISEASE    Kidney stones     multiple, has had lithotripsey as well as passed on his own    Low testosterone 8/10/2018    Opioid dependence ([address] 8/10/2017    Other ill-defined conditions(799.89)     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, on methadone    Severe anxiety with panic 12/5/2018    Thumb pain 8/10/2017    Vitamin D deficiency 8/10/2017     Past Surgical History:   Procedure Laterality Date    HX ORTHOPAEDIC      LEFT ROTATER CUFF REPAIR     HX ORTHOPAEDIC  08/2018    LUMBAR FUSION    HX OTHER SURGICAL      pylonidal cyst    HX OTHER SURGICAL      penial fracture     Allergies   Allergen Reactions    Iodinated Contrast- Oral And Iv Dye Hives    Other Medication Hives     CONTRAST WITH MRI     Current Outpatient Medications   Medication Sig Dispense Refill    citalopram (CELEXA) 20 mg tablet Take 1 Tab by mouth daily. 30 Tab 1    diclofenac EC (VOLTAREN) 75 mg EC tablet Take 75 mg by mouth as needed.  b-complex with vitamin c tablet Take 1 Tab by mouth daily.  Omeprazole delayed release (PRILOSEC D/R) 20 mg tablet Take 20 mg by mouth nightly.  testosterone cypionate (DEPOTESTOTERONE CYPIONATE) 200 mg/mL injection 75 mg by IntraMUSCular route Once every 2 weeks. LAST DOSE 8/6/18.  MULTIVITAMIN PO Take 1 Tab by mouth daily.  METHADONE HCL (METHADONE PO) Take 60 mg by mouth every morning.  simvastatin (ZOCOR) 10 mg tablet Take 40 mg by mouth nightly. Indications: HYPERCHOLESTEROLEMIA      levothyroxine (LEVOXYL) 100 mcg tablet Take 125 mcg by mouth Daily (before breakfast).  hydrocortisone (CORTEF) 10 mg tablet Take 20 mg by mouth every morning.  gabapentin (NEURONTIN) 100 mg capsule Take 600 mg by mouth two (2) times a day. Back pain      oxymetazoline (AFRIN, OXYMETAZOLINE,) 0.05 % nasal spray 2 Sprays by Both Nostrils route two (2) times a day.        Social History     Socioeconomic History    Marital status:      Spouse name: Not on file    Number of children: 2    Years of education: Not on file    Highest education level: Not on file   Tobacco Use    Smoking status: Current Every Day Smoker     Packs/day: 1.00     Years: 36.00     Pack years: 36.00    Smokeless tobacco: Never Used    Tobacco comment: LOOKING INTO CHANTIX   Substance and Sexual Activity    Alcohol use: No     Comment: RARELY    Drug use: No     Comment: METHODONE     Family History   Problem Relation Age of Onset    Cancer Mother         breast    High Cholesterol Mother     Stroke Father    [place] Diabetes Father     High Cholesterol Father     Cancer Sister         breast    High Cholesterol Sister     Cancer Sister         breast    High Cholesterol Sister     Cancer Brother         SKIN CANCER    Other Brother         GOUT    High Cholesterol Brother     Heart Attack Paternal Grandmother     Anesth Problems Neg Hx        Review of Systems:  GEN: no weight loss, weight gain, fatigue or night sweats  CV: no PND, orthopnea, or palpitations  Resp: no dyspnea on exertion, no cough  Abd: no nausea, vomiting or diarrhea  EXT: denies edema, claudication  Endocrine: no hair loss, excessive thirst or polyuria  Neurological ROS: no TIA or stroke symptoms  Psych: Positive for anxiety and intermittent panic  ROS otherwise negative      Objective:     Visit Vitals  /80 (BP 1 Location: Right arm, BP Patient Position: Sitting)   Pulse 87   Ht 5' 11\" (1.803 m)   Wt 207 lb (93.9 kg)   SpO2 95%   BMI 28.87 kg/m²     Body mass index is 28.87 kg/m². General:   alert, cooperative and no distress   Eyes: conjunctivae/sclerae clear. PERRL, EOM's intact   Mouth:  No oral lesions, no pharyngeal erythema, no exudates   Neck: Trachea midline, no thyromegaly, no bruits   Heart: S1 and S2 normal,no murmurs noted    Lungs: Clear to auscultation bilaterally, no increased work of breathing   Abdomen: Soft, nontender. Normal bowel sounds   Extremities: No edema or cyanosis   Neuro: ..alert, oriented x3,speech normal in context and clarity, cranial nerves II-XII intact,motor strength: full proximally and distally,gait: normal  reflexes: full and symmetric     Physical exam otherwise negative         Assessment/Plan:     Diagnoses and all orders for this visit:    Severe anxiety with panic  -     citalopram (CELEXA) 20 mg tablet; Take 1 Tab by mouth daily. , Normal, Disp-30 Tab, R-1        Other instructions: The patient's medications are reviewed and reconciled.   The patient is not a candidate for benzodiazepines due to his opiate usage. We will start him on Celexa 20 mg daily. Side effect profile was discussed and if he starts having unbearable side effects we will consider reducing dose to 10 mg a day. Have asked him return in 6 weeks time for reevaluation    Follow-up Disposition:  Return in about 6 weeks (around 1/16/2019).     Miguelito Farrell MD

## 2018-12-05 NOTE — PROGRESS NOTES
Barby Davies. is a 47 y.o. male presenting for Anxiety  . 1. Have you been to the ER, urgent care clinic since your last visit? Hospitalized since your last visit? Yes When: 11-26-18 Where: ENT Reason for visit: sinus surgery. 2. Have you seen or consulted any other health care providers outside of the [facility] since your last visit? Include any pap smears or colon screening. Yes When: 11-30-18 Where: ENT Reason for visit: sinus surgery follow up. No flowsheet data found. Abuse Screening Questionnaire 8/10/2018   Do you ever feel afraid of your partner? N   Are you in a relationship with someone who physically or mentally threatens you? N   Is it safe for you to go home? Y       PHQ over the last two weeks 8/10/2018   Little interest or pleasure in doing things Not at all   Feeling down, depressed, irritable, or hopeless Not at all   Total Score PHQ 2 0       There are no discontinued medications.

## 2019-06-24 ENCOUNTER — HOSPITAL ENCOUNTER (OUTPATIENT)
Dept: CT IMAGING | Age: 54
Discharge: HOME | End: 2019-06-24
Attending: ORTHOPAEDIC SURGERY
Payer: COMMERCIAL

## 2019-06-24 DIAGNOSIS — G89.29 CHRONIC BILATERAL LOW BACK PAIN WITHOUT SCIATICA: ICD-10-CM

## 2019-06-24 DIAGNOSIS — M54.50 CHRONIC BILATERAL LOW BACK PAIN WITHOUT SCIATICA: ICD-10-CM

## 2019-06-24 PROCEDURE — 72131 CT LUMBAR SPINE W/O DYE: CPT

## 2021-10-13 ENCOUNTER — TELEPHONE (OUTPATIENT)
Dept: INTERNAL MEDICINE CLINIC | Age: 56
End: 2021-10-13

## 2021-10-13 NOTE — TELEPHONE ENCOUNTER
Patient states he has had a cough since July and it is getting worse. Also he had a kidney stone about a month ago and blood in his stool this week. He states it may be from a hemorrhoid. He would like to be seen.  [phone] Please leave a message.

## 2021-10-15 ENCOUNTER — OFFICE VISIT (OUTPATIENT)
Dept: INTERNAL MEDICINE CLINIC | Age: 56
End: 2021-10-15
Payer: COMMERCIAL

## 2021-10-15 VITALS
OXYGEN SATURATION: 97 % | SYSTOLIC BLOOD PRESSURE: 122 MMHG | TEMPERATURE: 98.2 F | HEIGHT: 71 IN | RESPIRATION RATE: 20 BRPM | BODY MASS INDEX: 26.4 KG/M2 | WEIGHT: 188.6 LBS | HEART RATE: 89 BPM | DIASTOLIC BLOOD PRESSURE: 72 MMHG

## 2021-10-15 DIAGNOSIS — K92.1 BLOOD IN STOOL: ICD-10-CM

## 2021-10-15 DIAGNOSIS — F17.210 CIGARETTE NICOTINE DEPENDENCE WITHOUT COMPLICATION: ICD-10-CM

## 2021-10-15 DIAGNOSIS — R05.9 COUGH: Primary | ICD-10-CM

## 2021-10-15 PROCEDURE — 99214 OFFICE O/P EST MOD 30 MIN: CPT | Performed by: INTERNAL MEDICINE

## 2021-10-15 RX ORDER — BENZONATATE 200 MG/1
200 CAPSULE ORAL
Qty: 30 CAPSULE | Refills: 0 | Status: SHIPPED | OUTPATIENT
Start: 2021-10-15 | End: 2021-10-22

## 2021-10-15 NOTE — PATIENT INSTRUCTIONS
Stopping Smokeless Tobacco Use: Care Instructions  Your Care Instructions     Smokeless tobacco comes in many forms, such as snuff and chewing tobacco:  · Snuff is finely ground tobacco sold in cans or pouches. Most of the time, snuff is used by putting a \"pinch\" or \"dip\" between the lower lip or cheek and the gum. · Chewing tobacco is sold as loose leaves, plugs, or twists. It is chewed or placed between the cheek and the gum or teeth. There are plenty of reasons to stop using smokeless tobacco. These products are harmful. They are not risk-free alternatives to smoking. Smokeless tobacco contains nicotine, which is addicting. Though using smokeless tobacco is less harmful than smoking cigarettes, it can cause serious health problems, such as:  · White patches or red sores in your mouth that can turn into mouth cancer involving the lip, tongue, or cheek. · Tooth loss and other dental problems. · Gum disease. Your gums may pull away from your teeth and not grow back. People who use smokeless tobacco crave the nicotine in it. Giving up smokeless tobacco is much harder than simply changing a habit. Your body has to stop craving the nicotine. It is hard to quit, but you can do it. Many tools are available for people who want to quit using smokeless tobacco. You may find that combining tools works best for you. There are several steps to quitting. First you get ready to quit. Then you get support to help you. After that, you learn new skills and behaviors to quit. For many people, a necessary step is getting and using medicine. Your doctor will help you set up the plan that best meets your needs. You may want to attend a tobacco cessation program. When you choose a program, look for one that has proven success. Ask your doctor for ideas.  You will greatly increase your chances of success if you take medicine as well as get counseling or join a cessation program.  Some of the changes you feel when you first quit smokeless tobacco are uncomfortable. Your body will miss the nicotine at first, and you may feel short-tempered and grumpy. You may have trouble sleeping or concentrating. Medicine can help you deal with these symptoms. You may struggle with changing your habits and rituals. The last step is the tricky one: Be prepared for the urge to use smokeless tobacco to continue for a time. This is a lot to deal with, but keep at it. You will feel better. Follow-up care is a key part of your treatment and safety. Be sure to make and go to all appointments, and call your doctor if you are having problems. It's also a good idea to know your test results and keep a list of the medicines you take. How can you care for yourself at home? · Ask your family, friends, and coworkers for support. You have a better chance of quitting if you have help and support. · Join a support group for people who are trying to quit using smokeless tobacco.  · Set a quit date. Pick your date carefully so that it is not right in the middle of a big deadline or stressful time. After you quit, do not use smokeless tobacco even once. Get rid of all spit cups, cans, and pouches after your last use. Clean your house and your clothes so that they do not smell of tobacco.  · Learn how to be a non-user. Think about ways you can avoid those things that make you reach for tobacco.  ? Learn some ways to deal with cravings, like calling a friend or going for a walk. Cravings often pass. ? Avoid situations that put you at greatest risk for using smokeless tobacco. For some people, it is hard to spend time with friends without dipping or chewing. For others, they might skip a coffee break with coworkers who smoke or use smokeless tobacco.  ? Change your daily routine. Take a different route to work, or eat a meal in a different place. · Cut down on stress.  Calm yourself or release tension by doing an activity you enjoy, such as reading a book, taking a hot bath, or gardening. · Talk to your doctor or pharmacist about nicotine replacement therapy. You still get nicotine, but you do not use tobacco. Nicotine replacement products help you slowly reduce the amount of nicotine you need. Many of these products are available over the counter. They include nicotine patches, gum, lozenges, and inhalers. · Ask your doctor about bupropion (Wellbutrin) or varenicline (Chantix), which are prescription medicines. They do not contain nicotine. They help you by reducing withdrawal symptoms, such as stress and anxiety. · Get regular exercise. Having healthy habits will help your body move past its craving for nicotine. · Be prepared to keep trying. Most people are not successful the first few times they try to quit. Do not get mad at yourself if you use tobacco again. Make a list of things you learned, and think about when you want to try again, such as next week, next month, or next year. Where can you learn more? Go to http://www.gray.com/  Enter H181 in the search box to learn more about \"Stopping Smokeless Tobacco Use: Care Instructions. \"  Current as of: February 11, 2021               Content Version: 13.0  © 0145-1792 [employer]. Care instructions adapted under license by [employer] (which disclaims liability or warranty for this information). If you have questions about a medical condition or this instruction, always ask your healthcare professional. [place] [address] any warranty or liability for your use of this information.

## 2021-10-15 NOTE — PROGRESS NOTES
Subjective:     Mr. Juanito Rocha presents to the office today complaining of a dry cough. He has not been seen in this office in 3 years. He has been receiving his medical care from Dr. Robina Dial. The patient notes the acute onset of a dry cough in July. It is been consistent since that time. He denies any wheezing or sputum production. He has had no hoarseness or weight loss. The patient is a smoker of 1 pack a day for 40 years. He has also noted a recent hemorrhoid and some blood in his stool. It is not painful. Blood was present only once. He notes no family history of colon cancer or other colon problems. He is yet to have a screening colonoscopy.     Past Medical History:   Diagnosis Date    [place]'s disease ([facility]     Arthritis     Chest pain 8/22/2017    Chronic back pain 8/13/2017    Chronic insomnia 8/10/2017    Difficulty in urination 8/10/2017    Endocrine disease     raman's disease    GERD (gastroesophageal reflux disease)     Hypercholesteremia     Hypogonadism male 8/10/2017    Hypothyroidism 8/10/2017    ADDISONS DISEASE    Kidney stones     multiple, has had lithotripsey as well as passed on his own    Low testosterone 8/10/2018    Opioid dependence ([facility] 8/10/2017    Other ill-defined conditions(799.89)     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, on methadone    Severe anxiety with panic 12/5/2018    Thumb pain 8/10/2017    Vitamin D deficiency 8/10/2017     Past Surgical History:   Procedure Laterality Date    HX ORTHOPAEDIC      LEFT ROTATER CUFF REPAIR     HX ORTHOPAEDIC  08/2018    LUMBAR FUSION    HX OTHER SURGICAL      pylonidal cyst    HX OTHER SURGICAL      penial fracture     Allergies   Allergen Reactions    Iodinated Contrast Media Hives    Other Medication Hives     CONTRAST WITH MRI     Current Outpatient Medications   Medication Sig Dispense Refill    benzonatate (TESSALON) 200 mg capsule Take 1 Capsule by mouth three (3) times daily as needed for Cough for up to 7 days. 30 Capsule 0    b-complex with vitamin c tablet Take 1 Tab by mouth daily.  Omeprazole delayed release (PRILOSEC D/R) 20 mg tablet Take 20 mg by mouth nightly.  testosterone cypionate (DEPOTESTOTERONE CYPIONATE) 200 mg/mL injection 75 mg by IntraMUSCular route Once every 2 weeks. LAST DOSE 8/6/18.  MULTIVITAMIN PO Take 1 Tab by mouth daily.  simvastatin (ZOCOR) 10 mg tablet Take 40 mg by mouth nightly. Indications: HYPERCHOLESTEROLEMIA      levothyroxine (LEVOXYL) 100 mcg tablet Take 125 mcg by mouth Daily (before breakfast).  hydrocortisone (CORTEF) 10 mg tablet Take 20 mg by mouth every morning. Social History     Socioeconomic History    Marital status:      Spouse name: Not on file    Number of children: 2    Years of education: Not on file    Highest education level: Not on file   Tobacco Use    Smoking status: Current Every Day Smoker     Packs/day: 1.00     Years: 36.00     Pack years: 36.00    Smokeless tobacco: Never Used    Tobacco comment: LOOKING INTO CHANTIX   Vaping Use    Vaping Use: Never used   Substance and Sexual Activity    Alcohol use: No     Comment: RARELY    Drug use: No     Comment: METHODONE     Social Determinants of Health     Financial Resource Strain:     Difficulty of Paying Living Expenses:    Food Insecurity:     Worried About Running Out of Food in the Last Year:     Ran Out of Food in the Last Year:    Transportation Needs:     Lack of Transportation (Medical):      Lack of Transportation (Non-Medical):    Physical Activity:     Days of Exercise per Week:     Minutes of Exercise per Session:    Stress:     Feeling of Stress :    Social Connections:     Frequency of Communication with Friends and Family:     Frequency of Social Gatherings with Friends and Family:     Attends Yazdanism Services:     Active Member of Clubs or Organizations:     Attends Club or Organization Meetings:     Marital Status:      Family History   Problem Relation Age of Onset    Cancer Mother         breast    High Cholesterol Mother     Stroke Father     Diabetes Father     High Cholesterol Father     Cancer Sister         breast    High Cholesterol Sister     Cancer Sister         breast    High Cholesterol Sister     Cancer Brother         SKIN CANCER    Other Brother         GOUT    High Cholesterol Brother     Heart Attack Paternal Grandmother     Anesth Problems Neg Hx        Review of Systems:  GEN: no weight loss, weight gain, fatigue or night sweats  CV: no PND, orthopnea, or palpitations  Resp: Positive for persistent dry cough  Abd: Positive for recent hemorrhoid and one-time episode of blood in stool  EXT: denies edema, claudication  Endocrine: no hair loss, excessive thirst or polyuria  Neurological ROS: no TIA or stroke symptoms  ROS otherwise negative      Objective:     Visit Vitals  /72 (BP 1 Location: Left upper arm, BP Patient Position: Sitting, BP Cuff Size: Adult)   Pulse 89   Temp 98.2 °F (36.8 °C) (Oral)   Resp 20   Ht 5' 11\" (1.803 m)   Wt 188 lb 9.6 oz (85.5 kg)   SpO2 97%   BMI 26.30 kg/m²     Body mass index is 26.3 kg/m². General:   alert, cooperative and no distress   Eyes: conjunctivae/sclerae clear. PERRL, EOM's intact   Mouth:  No oral lesions, no pharyngeal erythema, no exudates   Neck: Trachea midline, no thyromegaly, no bruits   Heart: S1 and S2 normal,no murmurs noted    Lungs: Clear to auscultation bilaterally, no increased work of breathing   Abdomen: Soft, nontender. Normal bowel sounds   Extremities: No edema or cyanosis   Neuro: ..alert, oriented x3,speech normal in context and clarity, cranial nerves II-XII intact,motor strength: full proximally and distally,gait: normal  reflexes: full and symmetric     Physical exam otherwise negative         Assessment/Plan:     Diagnoses and all orders for this visit:    Cough  -     XR CHEST PA LAT;  Future  -     CT CHEST WO CONT; Future    Cigarette nicotine dependence without complication  -     XR CHEST PA LAT; Future  -     CT CHEST WO CONT; Future    Blood in stool  -     REFERRAL TO COLON AND RECTAL SURGERY    Other orders  -     benzonatate (TESSALON) 200 mg capsule; Take 1 Capsule by mouth three (3) times daily as needed for Cough for up to 7 days. , Normal, Disp-30 Capsule, R-0        Other instructions: The patient's medications were reviewed and reconciled. A chest x-ray is obtained and appears unremarkable. A CT of the chest will be obtained due to his smoking history and persistent cough    Tessalon Perles prescribed to take symptomatically    Referral to colon and rectal specialist ordered    Follow-up here pending results and response to the above    Luis Felipe Beasley MD    Please note that this dictation was completed with [employer], the computer voice recognition software. Quite often unanticipated grammatical, syntax, homophones, and other interpretive errors are inadvertently transcribed by the computer software. Please disregard these errors. Please excuse any errors that have escaped final proofreading.

## 2021-10-15 NOTE — PROGRESS NOTES
Kailyn Hawley. is a 55 y.o. male presenting for Cough  . 1. Have you been to the ER, urgent care clinic since your last visit? Hospitalized since your last visit? No    2. Have you seen or consulted any other health care providers outside of the [address] [place] [address] since your last visit? Include any pap smears or colon screening. Dr Duane Elise    No flowsheet data found. Abuse Screening Questionnaire 8/10/2018   Do you ever feel afraid of your partner? N   Are you in a relationship with someone who physically or mentally threatens you? N   Is it safe for you to go home? Y       3 most recent PHQ Screens 10/15/2021   Little interest or pleasure in doing things Not at all   Feeling down, depressed, irritable, or hopeless Not at all   Total Score PHQ 2 0       There are no discontinued medications.

## 2021-10-21 ENCOUNTER — TELEPHONE (OUTPATIENT)
Dept: INTERNAL MEDICINE CLINIC | Age: 56
End: 2021-10-21

## 2021-10-21 NOTE — TELEPHONE ENCOUNTER
[facility] CT Dept. Called and states his insurance approved the scan for dx [id number] which is a cancer screening. Your original order was for [phone] chest CT scan. If you want him to have the original order, they would like for you to put in another order for [phone].

## 2021-11-17 ENCOUNTER — TRANSCRIBE ORDER (OUTPATIENT)
Dept: SCHEDULING | Age: 56
End: 2021-11-17

## 2021-11-17 ENCOUNTER — TELEPHONE (OUTPATIENT)
Dept: INTERNAL MEDICINE CLINIC | Age: 56
End: 2021-11-17

## 2021-11-17 NOTE — TELEPHONE ENCOUNTER
Message sent to radiology to be more specific in regards to which specific CT scan to order as the CPT code does not help in the order section

## 2021-11-18 DIAGNOSIS — R05.9 COUGH: ICD-10-CM

## 2021-11-18 DIAGNOSIS — Z12.2 SCREENING FOR LUNG CANCER: Primary | ICD-10-CM

## 2021-11-22 ENCOUNTER — TELEPHONE (OUTPATIENT)
Dept: INTERNAL MEDICINE CLINIC | Age: 56
End: 2021-11-22

## 2021-11-22 NOTE — TELEPHONE ENCOUNTER
Patient called regarding a Ct scan that was needed. Per patient one was set up at the hospital however, the hospital canceled it without notifying him. Now the patient is having issues getting another Ct scheduled as the code for the procedure has been changed. Patient requested to speak to Naveen Silva regarding getting the Ct scan straight.     CB: [phone]

## 2021-11-22 NOTE — TELEPHONE ENCOUNTER
Patient advised Dr Niranjan Lowe reordered the CT on 11-18-21. Gave patient the scheduling # to [facility] to call and set this up.

## 2021-11-26 ENCOUNTER — HOSPITAL ENCOUNTER (OUTPATIENT)
Dept: CT IMAGING | Age: 56
Discharge: HOME | End: 2021-11-26
Attending: INTERNAL MEDICINE
Payer: COMMERCIAL

## 2021-11-26 ENCOUNTER — APPOINTMENT (OUTPATIENT)
Dept: CT IMAGING | Age: 56
End: 2021-11-26
Attending: INTERNAL MEDICINE
Payer: COMMERCIAL

## 2021-11-26 ENCOUNTER — TELEPHONE (OUTPATIENT)
Dept: INTERNAL MEDICINE CLINIC | Age: 56
End: 2021-11-26

## 2021-11-26 DIAGNOSIS — E04.1 THYROID NODULE: Primary | ICD-10-CM

## 2021-11-26 DIAGNOSIS — Z12.2 SCREENING FOR LUNG CANCER: ICD-10-CM

## 2021-11-26 DIAGNOSIS — R05.9 COUGH: ICD-10-CM

## 2021-11-26 PROCEDURE — 71271 CT THORAX LUNG CANCER SCR C-: CPT

## 2021-11-26 NOTE — PROGRESS NOTES
CT chest shows two small nodules that they recommend FU CT scan in 1 year    1.7 cm right thyroid nodule present and they recommend ultrasound of thyroid gland

## 2021-11-26 NOTE — TELEPHONE ENCOUNTER
----- Message from Quan Beaulieu MD sent at 11/26/2021  9:14 AM EST -----  CT chest shows two small nodules that they recommend FU CT scan in 1 year    1.7 cm right thyroid nodule present and they recommend ultrasound of thyroid gland

## 2021-12-02 ENCOUNTER — HOSPITAL ENCOUNTER (OUTPATIENT)
Dept: ULTRASOUND IMAGING | Age: 56
Discharge: HOME | End: 2021-12-02
Attending: INTERNAL MEDICINE
Payer: COMMERCIAL

## 2021-12-02 DIAGNOSIS — E04.1 THYROID NODULE: ICD-10-CM

## 2021-12-02 PROCEDURE — 76536 US EXAM OF HEAD AND NECK: CPT | Performed by: INTERNAL MEDICINE

## 2021-12-02 NOTE — PROGRESS NOTES
Ultrasound of thyroid shows a homogenous nodule and not a cyst.  Would recommend an endocrinology consultation to have this further worked up for possible biopsy

## 2022-01-06 ENCOUNTER — TRANSCRIBE ORDER (OUTPATIENT)
Dept: SCHEDULING | Age: 57
End: 2022-01-06

## 2022-01-06 DIAGNOSIS — E04.1 CYST OF THYROID: Primary | ICD-10-CM

## 2022-01-11 ENCOUNTER — TRANSCRIBE ORDER (OUTPATIENT)
Dept: SCHEDULING | Age: 57
End: 2022-01-11

## 2022-01-11 DIAGNOSIS — E04.1 CYST OF THYROID: Primary | ICD-10-CM

## 2022-01-19 ENCOUNTER — HOSPITAL ENCOUNTER (OUTPATIENT)
Dept: ULTRASOUND IMAGING | Age: 57
Discharge: HOME | End: 2022-01-19
Attending: INTERNAL MEDICINE
Payer: COMMERCIAL

## 2022-01-19 DIAGNOSIS — E04.1 CYST OF THYROID: ICD-10-CM

## 2022-01-19 PROCEDURE — 88173 CYTOPATH EVAL FNA REPORT: CPT

## 2022-01-19 PROCEDURE — 74011000250 HC RX REV CODE- 250: Performed by: PHYSICIAN ASSISTANT

## 2022-01-19 PROCEDURE — 10005 FNA BX W/US GDN 1ST LES: CPT

## 2022-01-19 PROCEDURE — 88172 CYTP DX EVAL FNA 1ST EA SITE: CPT

## 2022-01-19 RX ORDER — LIDOCAINE HYDROCHLORIDE 10 MG/ML
10 INJECTION, SOLUTION EPIDURAL; INFILTRATION; INTRACAUDAL; PERINEURAL
Status: COMPLETED | OUTPATIENT
Start: 2022-01-19 | End: 2022-01-19

## 2022-01-19 RX ADMIN — LIDOCAINE HYDROCHLORIDE 10 ML: 10 INJECTION, SOLUTION EPIDURAL; INFILTRATION; INTRACAUDAL; PERINEURAL at 17:00

## 2022-03-18 PROBLEM — F17.210 CIGARETTE NICOTINE DEPENDENCE WITHOUT COMPLICATION: Status: ACTIVE | Noted: 2017-08-22

## 2022-03-18 PROBLEM — R05.9 COUGH: Status: ACTIVE | Noted: 2021-10-15

## 2022-03-18 PROBLEM — F41.0 SEVERE ANXIETY WITH PANIC: Status: ACTIVE | Noted: 2018-12-05

## 2022-03-18 PROBLEM — E27.1 ADDISON'S DISEASE (HCC): Status: ACTIVE | Noted: 2017-08-13

## 2022-03-18 PROBLEM — R79.89 LOW TESTOSTERONE: Status: ACTIVE | Noted: 2018-08-10

## 2022-03-18 PROBLEM — M54.9 CHRONIC BACK PAIN: Status: ACTIVE | Noted: 2017-08-13

## 2022-03-18 PROBLEM — G89.29 CHRONIC BACK PAIN: Status: ACTIVE | Noted: 2017-08-13

## 2022-03-19 PROBLEM — K92.1 BLOOD IN STOOL: Status: ACTIVE | Noted: 2021-10-15

## 2022-03-19 PROBLEM — R39.198 DIFFICULTY IN URINATION: Status: ACTIVE | Noted: 2017-08-10

## 2022-03-19 PROBLEM — R07.9 CHEST PAIN: Status: ACTIVE | Noted: 2017-08-22

## 2022-03-19 PROBLEM — M48.061 SPINAL STENOSIS, LUMBAR: Status: ACTIVE | Noted: 2018-08-20

## 2022-03-19 PROBLEM — F51.04 CHRONIC INSOMNIA: Status: ACTIVE | Noted: 2017-08-10

## 2022-03-19 PROBLEM — N20.0 KIDNEY STONES: Status: ACTIVE | Noted: 2017-08-13

## 2022-03-19 PROBLEM — E29.1 HYPOGONADISM MALE: Status: ACTIVE | Noted: 2017-08-10

## 2022-03-19 PROBLEM — R97.20 RISING PSA LEVEL: Status: ACTIVE | Noted: 2017-08-22

## 2022-03-19 PROBLEM — D75.1 POLYCYTHEMIA: Status: ACTIVE | Noted: 2017-08-22

## 2022-03-19 PROBLEM — E03.9 HYPOTHYROIDISM: Status: ACTIVE | Noted: 2017-08-10

## 2022-03-19 PROBLEM — E55.9 VITAMIN D DEFICIENCY: Status: ACTIVE | Noted: 2017-08-10

## 2022-03-20 PROBLEM — F11.20 OPIOID DEPENDENCE (HCC): Status: ACTIVE | Noted: 2017-08-10

## 2022-03-20 PROBLEM — M79.646 THUMB PAIN: Status: ACTIVE | Noted: 2017-08-10

## 2022-03-24 ENCOUNTER — TRANSCRIBE ORDER (OUTPATIENT)
Dept: SCHEDULING | Age: 57
End: 2022-03-24

## 2022-03-24 DIAGNOSIS — E04.1 NONTOXIC UNINODULAR GOITER: Primary | ICD-10-CM

## 2022-03-28 ENCOUNTER — TRANSCRIBE ORDER (OUTPATIENT)
Dept: SCHEDULING | Age: 57
End: 2022-03-28

## 2022-03-28 DIAGNOSIS — E04.1 THYROID NODULE: Primary | ICD-10-CM

## 2022-04-05 ENCOUNTER — HOSPITAL ENCOUNTER (OUTPATIENT)
Dept: ULTRASOUND IMAGING | Age: 57
Discharge: HOME | End: 2022-04-05
Attending: INTERNAL MEDICINE
Payer: COMMERCIAL

## 2022-04-05 DIAGNOSIS — E04.1 THYROID NODULE: ICD-10-CM

## 2022-04-05 PROCEDURE — 88172 CYTP DX EVAL FNA 1ST EA SITE: CPT

## 2022-04-05 PROCEDURE — 10005 FNA BX W/US GDN 1ST LES: CPT

## 2022-04-05 PROCEDURE — 88173 CYTOPATH EVAL FNA REPORT: CPT

## 2022-04-05 PROCEDURE — 74011000250 HC RX REV CODE- 250: Performed by: STUDENT IN AN ORGANIZED HEALTH CARE EDUCATION/TRAINING PROGRAM

## 2022-04-05 RX ORDER — LIDOCAINE HYDROCHLORIDE 10 MG/ML
10 INJECTION, SOLUTION EPIDURAL; INFILTRATION; INTRACAUDAL; PERINEURAL
Status: COMPLETED | OUTPATIENT
Start: 2022-04-05 | End: 2022-04-05

## 2022-04-05 RX ADMIN — LIDOCAINE HYDROCHLORIDE 10 ML: 10 INJECTION, SOLUTION EPIDURAL; INFILTRATION; INTRACAUDAL; PERINEURAL at 16:00

## 2022-06-08 ENCOUNTER — HOSPITAL ENCOUNTER (OUTPATIENT)
Dept: PREADMISSION TESTING | Age: 57
Discharge: HOME | End: 2022-06-08
Payer: COMMERCIAL

## 2022-06-08 VITALS
TEMPERATURE: 98.3 F | HEIGHT: 71 IN | DIASTOLIC BLOOD PRESSURE: 86 MMHG | HEART RATE: 80 BPM | WEIGHT: 203.93 LBS | SYSTOLIC BLOOD PRESSURE: 128 MMHG | BODY MASS INDEX: 28.55 KG/M2 | OXYGEN SATURATION: 94 %

## 2022-06-08 LAB
ALBUMIN SERPL-MCNC: 3.7 G/DL (ref 3.5–5)
ALBUMIN/GLOB SERPL: 1.1 {RATIO} (ref 1.1–2.2)
ALP SERPL-CCNC: 76 U/L (ref 45–117)
ALT SERPL-CCNC: 78 U/L (ref 12–78)
ANION GAP SERPL CALC-SCNC: 5 MMOL/L (ref 5–15)
AST SERPL-CCNC: 34 U/L (ref 15–37)
ATRIAL RATE: 78 BPM
BASOPHILS # BLD: 0 K/UL (ref 0–0.1)
BASOPHILS NFR BLD: 1 % (ref 0–1)
BILIRUB SERPL-MCNC: 0.5 MG/DL (ref 0.2–1)
BUN SERPL-MCNC: 18 MG/DL (ref 6–20)
BUN/CREAT SERPL: 16 (ref 12–20)
CALCIUM SERPL-MCNC: 9.6 MG/DL (ref 8.5–10.1)
CALCULATED P AXIS, ECG09: 132 DEGREES
CALCULATED R AXIS, ECG10: 36 DEGREES
CALCULATED T AXIS, ECG11: 78 DEGREES
CHLORIDE SERPL-SCNC: 105 MMOL/L (ref 97–108)
CO2 SERPL-SCNC: 26 MMOL/L (ref 21–32)
CREAT SERPL-MCNC: 1.1 MG/DL (ref 0.7–1.3)
DIAGNOSIS, 93000: NORMAL
DIFFERENTIAL METHOD BLD: NORMAL
EOSINOPHIL # BLD: 0.2 K/UL (ref 0–0.4)
EOSINOPHIL NFR BLD: 2 % (ref 0–7)
ERYTHROCYTE [DISTWIDTH] IN BLOOD BY AUTOMATED COUNT: 12.8 % (ref 11.5–14.5)
GLOBULIN SER CALC-MCNC: 3.4 G/DL (ref 2–4)
GLUCOSE SERPL-MCNC: 153 MG/DL (ref 65–100)
HCT VFR BLD AUTO: 50.3 % (ref 36.6–50.3)
HGB BLD-MCNC: 16.5 G/DL (ref 12.1–17)
IMM GRANULOCYTES # BLD AUTO: 0 K/UL (ref 0–0.04)
IMM GRANULOCYTES NFR BLD AUTO: 0 % (ref 0–0.5)
LYMPHOCYTES # BLD: 2.7 K/UL (ref 0.8–3.5)
LYMPHOCYTES NFR BLD: 35 % (ref 12–49)
MCH RBC QN AUTO: 31.5 PG (ref 26–34)
MCHC RBC AUTO-ENTMCNC: 32.8 G/DL (ref 30–36.5)
MCV RBC AUTO: 96 FL (ref 80–99)
MONOCYTES # BLD: 0.5 K/UL (ref 0–1)
MONOCYTES NFR BLD: 6 % (ref 5–13)
NEUTS SEG # BLD: 4.3 K/UL (ref 1.8–8)
NEUTS SEG NFR BLD: 56 % (ref 32–75)
NRBC # BLD: 0 K/UL (ref 0–0.01)
NRBC BLD-RTO: 0 PER 100 WBC
P-R INTERVAL, ECG05: 174 MS
PLATELET # BLD AUTO: 307 K/UL (ref 150–400)
PMV BLD AUTO: 9.4 FL (ref 8.9–12.9)
POTASSIUM SERPL-SCNC: 3.7 MMOL/L (ref 3.5–5.1)
PROT SERPL-MCNC: 7.1 G/DL (ref 6.4–8.2)
Q-T INTERVAL, ECG07: 386 MS
QRS DURATION, ECG06: 78 MS
QTC CALCULATION (BEZET), ECG08: 440 MS
RBC # BLD AUTO: 5.24 M/UL (ref 4.1–5.7)
SODIUM SERPL-SCNC: 136 MMOL/L (ref 136–145)
T4 FREE SERPL-MCNC: 0.9 NG/DL (ref 0.8–1.5)
TSH SERPL DL<=0.05 MIU/L-ACNC: 0.98 UIU/ML (ref 0.36–3.74)
VENTRICULAR RATE, ECG03: 78 BPM
WBC # BLD AUTO: 7.8 K/UL (ref 4.1–11.1)

## 2022-06-08 PROCEDURE — 84439 ASSAY OF FREE THYROXINE: CPT

## 2022-06-08 PROCEDURE — 93005 ELECTROCARDIOGRAM TRACING: CPT

## 2022-06-08 PROCEDURE — 85025 COMPLETE CBC W/AUTO DIFF WBC: CPT

## 2022-06-08 PROCEDURE — 84443 ASSAY THYROID STIM HORMONE: CPT

## 2022-06-08 PROCEDURE — 80053 COMPREHEN METABOLIC PANEL: CPT

## 2022-06-08 NOTE — PERIOP NOTES
[address] INSTRUCTIONS    Surgery Date:   6/10/22    Your surgeon's office or [facility] staff will call you between 4 PM- 8 PM the day before surgery with your arrival time. If your surgery is on a Monday, you will receive a call the preceding Friday. 1. Please report to [facility] Patient Access/Admitting on the 1st floor. Bring your insurance card, photo identification, and any copayment ( if applicable). 2. If you are going home the same day of your surgery, you must have a responsible adult to drive you home. You need to have a responsible adult to stay with you the first 24 hours after surgery and you should not drive a car for 24 hours following your surgery. 3. Do NOT eat any solid foods after midnight the night before surgery including candy, mint or gum. You may drink clear liquids from midnight until 1 hour prior to your arrival. You may drink up to 12 ounces at one time every 4 hours. Please note special instructions, if applicable, below for medications. 4. Do NOT drink alcohol or smoke 24 hours before surgery. STOP smoking for 14 days prior as it helps with breathing and healing after surgery. 5. If you are being admitted to the hospital, please leave personal belongings/luggage in your car until you have an assigned hospital room number. 6. Please wear comfortable clothes. Wear your glasses instead of contacts. We ask that all money, jewelry and valuables be left at home. Wear no make up, particularly mascara, the day of surgery. 7.  All body piercings, rings, and jewelry need to be removed and left at home. Please remove any nail polish or artifical nails from your fingernails. Please wear your hair loose or down. Please no pony-tails, buns, or any metal hair accessories. If you shower the morning of surgery, please do not apply any lotions or powders afterwards. You may wear deodorant, unless having breast surgery.   Do not shave any body area within 24 hours of your surgery. 8. Please follow all instructions to avoid any potential surgical cancellation. 9. Should your physical condition change, (i.e. fever, cold, flu, etc.) please notify your surgeon as soon as possible. 10. It is important to be on time. If a situation occurs where you may be delayed, please call:  [phone] / [phone] on the day of surgery. 11. The Preadmission Testing staff can be reached at [phone]. 12. Special instructions: NONE      Current Outpatient Medications   Medication Sig    b-complex with vitamin c tablet Take 1 Tab by mouth daily.  Omeprazole delayed release (PRILOSEC D/R) 20 mg tablet Take 20 mg by mouth nightly.  testosterone cypionate (DEPOTESTOTERONE CYPIONATE) 200 mg/mL injection 75 mg by IntraMUSCular route Once every 2 weeks. LAST DOSE 8/6/18.  MULTIVITAMIN PO Take 1 Tab by mouth daily.  simvastatin (ZOCOR) 10 mg tablet Take 40 mg by mouth nightly. Indications: HYPERCHOLESTEROLEMIA    levothyroxine (LEVOXYL) 100 mcg tablet Take 125 mcg by mouth Daily (before breakfast).  hydrocortisone (CORTEF) 10 mg tablet Take 20 mg by mouth every morning. No current facility-administered medications for this encounter. 1. YOU MUST ONLY TAKE THESE MEDICATIONS THE MORNING OF SURGERY WITH A SIP OF WATER: LEVOTHYROXINE, HYDROCORTISONE,   2. MEDICATIONS TO TAKE THE MORNING OF SURGERY ONLY IF NEEDED: NONE  3. HOLD these prescription medications BEFORE Surgery: NONE  4. Ask your surgeon/prescribing physician about when/if to STOP taking these medications: NONE  5. Stop all vitamins, herbal medicines and Aspirin containing products 7 days prior to surgery. Stop any non-steroidal anti-inflammatory drugs (i.e. Ibuprofen, Naproxen, Advil, Aleve) 3 days before surgery. You may take Tylenol. 6. If you are currently taking Plavix, Coumadin,or any other blood-thinning/anticoagulant medication contact your prescribing physician for instructions.     Eating and Drinking Before Surgery     You may eat a regular dinner at the usual time on the day before your surgery.  Do NOT eat any solid foods after midnight unless your arrival time at the hospital is 3pm or later.  You may drink clear liquids only from 12 midnight until 1 hours prior to your arrival time at the hospital on the day of your surgery. Do NOT drink alcohol.  Clear liquids include:  o Water  o Fruit juices without pulp( i.e. apple juice)  o Carbonated beverages  o Black coffee (no cream/milk)  o Tea (no cream/milk)  o Gatorade   You may drink up to 12-16 ounces at one time every 4 hours between the hours of midnight and 1 hour before your arrival time at the hospital. Example- if your arrival time at the hospital is 6am, you may drink 12-16 ounces of clear liquids no later than 5am.   If your arrival time at the hospital is 3pm or later, you may eat a light breakfast before 8am.   A light breakfast includes:  o Toast or bagel (no butter)  o Black coffee (no cream/milk)  o Tea (no cream/milk)  o Fruit juices without pulp ( i.e. apple juice)  o Do NOT eat meat, eggs, vegetables or fruit   If you have any questions, please contact your surgeon's office. Preventing Infections Before and After - Your Surgery    IMPORTANT INSTRUCTIONS    You play an important role in your health and preparation for surgery. To reduce the germs on your skin you will need to shower with CHG soap (Chorhexidine gluconate 4%) two times before surgery. CHG soap (Hibiclens, Hex-A-Clens or store brand)   CHG soap will be provided at your Preadmission Testing (PAT) appointment.  If you do not have a PAT appointment before surgery, you may arrange to  CHG soap from our office or purchase CHG soap at a pharmacy, grocery or department store.  You need to purchase TWO 4 ounce bottles to use for your 2 showers. Steps to follow:  1.  Wash your hair with your normal shampoo and your body with regular soap and rinse well to remove shampoo and soap from your skin. 2. Wet a clean washcloth and turn off the shower. 3. Put CHG soap on washcloth and apply to your entire body from the neck down. Do not use on your head, face or private parts(genitals). Do not use CHG soap on open sores, wounds or areas of skin irritation. 4. Wash you body gently for 5 minutes. Do not wash your skin too hard. This soap does not create lather. Pay special attention to your underarms and from your belly button to your feet. 5. Turn the shower back on and rinse well to get CHG soap off your body. 6. Pat your skin dry with a clean, dry towel. Do not apply lotions or moisturizer. 7. Put on clean clothes and sleep on fresh bed sheets and do not allow pets to sleep with you. Shower with CHG soap 2 times before your surgery   The evening before your surgery   The morning of your surgery      Tips to help prevent infections after your surgery:  1. Protect your surgical wound from germs:  ? Hand washing is the most important thing you and your caregivers can do to prevent infections. ? Keep your bandage clean and dry! ? Do not touch your surgical wound. 2. Use clean, freshly washed towels and washcloths every time you shower; do not share bath linens with others. 3. Until your surgical wound is healed, wear clothing and sleep on bed linens each day that are clean and freshly washed. 4. Do not allow pets to sleep in your bed with you or touch your surgical wound. 5. Do not smoke - smoking delays wound healing. This may be a good time to stop smoking. 6. If you have diabetes, it is important for you to manage your blood sugar levels properly before your surgery as well as after your surgery. Poorly managed blood sugar levels slow down wound healing and prevent you from healing completely.               Patient Information Regarding COVID Restrictions      Day of Procedure     Please park in the parking deck or any designated visitor parking lot.   Enter the facility through the Main Entrance of the hospital.   On the day of surgery, please provide the cell phone number of the person who will be waiting for you to the Patient Access representative at the time of registration.  Please wear a mask on the day of your procedure.  We are now allowing two designated visitors per stay. Pediatric patients may have 2 designated visitors. These two people may come in with you on the day of your procedure.  No visitors under the age of 13.  The designated visitor must also wear a mask.  Once your procedure and the immediate recovery period is completed, a nurse in the recovery area will contact your designated visitor to inform them of your room number or to otherwise review other pertinent information regarding your care.  Social distancing practices are to be adhered to in waiting areas and the cafeteria. The patient was contacted  in person. He verbalized understanding of all instructions and does not need reinforcement.

## 2022-06-08 NOTE — PERIOP NOTES
Jackie Lanes Jr.(1965) was seen at [address] [place] [address] on 6/8/22. They have surgery scheduled with Dr. Shahram Chance on 6/10/22. The results of their CECY STOP-BANG Scoring Tool indicates the potential need for a referral to sleep medicine. Results forwarded to PCP for follow-up/further recommendations regarding evaluation for sleep apnea. CECY STOP-BANG Scoring Tool    [x] Does the patient snore loud enough to be heard through a closed door? [x] Does the patient often feel tired, fatigued or sleep during the daytime or after a \"good\" night's sleep? [x] Has anyone observed the patient stop breathing during their sleep? [] Does the patient have or are they treated for HTN? [] Is the patient's BMI >35? [] Is the patient's neck size >17\"(male) or >16\"(female)? [x] Is the patient older than 48? [x] Is the patient male?     CECY Risk Score: 8[address] [facility], NP

## 2022-06-09 NOTE — PERIOP NOTES
CALLED DR. Julio Situ OFFICE TO NOTIFY OF ABNORMAL LABS (GLUCOSE 153) SPOKE WITH REBECCA THAT WILL NOTIFY DR. Bridger Mensah. ALSO TOLD HER THAT EKG DONE ON 6/8/22 WAS ABNORMAL AND THAT I HAD CONSULTED OUR NP, ANIKA QUEZADA ON IT AND SHE STATED IT WAS OK FOR SURGERY. CMP RESULTS ROUTED VIA CC FAX TO PCP'S OFFICE [phone].

## 2022-06-10 ENCOUNTER — HOSPITAL ENCOUNTER (OUTPATIENT)
Age: 57
Setting detail: OBSERVATION
Discharge: HOME | End: 2022-06-11
Attending: OTOLARYNGOLOGY | Admitting: OTOLARYNGOLOGY
Payer: COMMERCIAL

## 2022-06-10 ENCOUNTER — ANESTHESIA (OUTPATIENT)
Dept: MEDSURG UNIT | Age: 57
End: 2022-06-10
Payer: COMMERCIAL

## 2022-06-10 ENCOUNTER — ANESTHESIA EVENT (OUTPATIENT)
Dept: MEDSURG UNIT | Age: 57
End: 2022-06-10
Payer: COMMERCIAL

## 2022-06-10 DIAGNOSIS — L76.82 PAIN AT SURGICAL INCISION: Primary | ICD-10-CM

## 2022-06-10 PROBLEM — E04.1 THYROID NODULE: Status: ACTIVE | Noted: 2022-06-10

## 2022-06-10 LAB
GLUCOSE BLD STRIP.AUTO-MCNC: 186 MG/DL (ref 65–117)
SERVICE CMNT-IMP: ABNORMAL

## 2022-06-10 PROCEDURE — 74011250636 HC RX REV CODE- 250/636: Performed by: OTOLARYNGOLOGY

## 2022-06-10 PROCEDURE — 2709999900 HC NON-CHARGEABLE SUPPLY

## 2022-06-10 PROCEDURE — 74011250636 HC RX REV CODE- 250/636: Performed by: NURSE ANESTHETIST, CERTIFIED REGISTERED

## 2022-06-10 PROCEDURE — 77030002933 HC SUT MCRYL J&J -A: Performed by: OTOLARYNGOLOGY

## 2022-06-10 PROCEDURE — 74011000250 HC RX REV CODE- 250: Performed by: OTOLARYNGOLOGY

## 2022-06-10 PROCEDURE — 77030013079 HC BLNKT BAIR HGGR 3M -A: Performed by: NURSE ANESTHETIST, CERTIFIED REGISTERED

## 2022-06-10 PROCEDURE — 76030000001 HC AMB SURG OR TIME 1 TO 1.5: Performed by: OTOLARYNGOLOGY

## 2022-06-10 PROCEDURE — 82962 GLUCOSE BLOOD TEST: CPT

## 2022-06-10 PROCEDURE — 77030031753 HC SHR ENDO COAG HARM J&J -E: Performed by: OTOLARYNGOLOGY

## 2022-06-10 PROCEDURE — 77030040922 HC BLNKT HYPOTHRM STRY -A

## 2022-06-10 PROCEDURE — 77030026438 HC STYL ET INTUB CARD -A: Performed by: NURSE ANESTHETIST, CERTIFIED REGISTERED

## 2022-06-10 PROCEDURE — 82310 ASSAY OF CALCIUM: CPT

## 2022-06-10 PROCEDURE — 83735 ASSAY OF MAGNESIUM: CPT

## 2022-06-10 PROCEDURE — 74011250636 HC RX REV CODE- 250/636: Performed by: ANESTHESIOLOGY

## 2022-06-10 PROCEDURE — G0378 HOSPITAL OBSERVATION PER HR: HCPCS

## 2022-06-10 PROCEDURE — 76060000062 HC AMB SURG ANES 1 TO 1.5 HR: Performed by: OTOLARYNGOLOGY

## 2022-06-10 PROCEDURE — 74011000250 HC RX REV CODE- 250: Performed by: NURSE ANESTHETIST, CERTIFIED REGISTERED

## 2022-06-10 PROCEDURE — 77030031139 HC SUT VCRL2 J&J -A: Performed by: OTOLARYNGOLOGY

## 2022-06-10 PROCEDURE — 77030008698 HC TU ET REINF MEDT -D: Performed by: NURSE ANESTHETIST, CERTIFIED REGISTERED

## 2022-06-10 PROCEDURE — 77030019908 HC STETH ESOPH SIMS -A: Performed by: NURSE ANESTHETIST, CERTIFIED REGISTERED

## 2022-06-10 PROCEDURE — 2709999900 HC NON-CHARGEABLE SUPPLY: Performed by: OTOLARYNGOLOGY

## 2022-06-10 PROCEDURE — 74011250637 HC RX REV CODE- 250/637: Performed by: OTOLARYNGOLOGY

## 2022-06-10 PROCEDURE — 88307 TISSUE EXAM BY PATHOLOGIST: CPT

## 2022-06-10 PROCEDURE — 77030021052 HC RNG RETRCTR STAY COOP -A: Performed by: OTOLARYNGOLOGY

## 2022-06-10 PROCEDURE — 76210000040 HC AMBSU PH I REC FIRST 0.5 HR: Performed by: OTOLARYNGOLOGY

## 2022-06-10 PROCEDURE — 84100 ASSAY OF PHOSPHORUS: CPT

## 2022-06-10 PROCEDURE — 77030002996 HC SUT SLK J&J -A: Performed by: OTOLARYNGOLOGY

## 2022-06-10 PROCEDURE — 36415 COLL VENOUS BLD VENIPUNCTURE: CPT

## 2022-06-10 RX ORDER — MIDAZOLAM HYDROCHLORIDE 1 MG/ML
1 INJECTION, SOLUTION INTRAMUSCULAR; INTRAVENOUS AS NEEDED
Status: DISCONTINUED | OUTPATIENT
Start: 2022-06-10 | End: 2022-06-10 | Stop reason: HOSPADM

## 2022-06-10 RX ORDER — DIPHENHYDRAMINE HYDROCHLORIDE 50 MG/ML
12.5 INJECTION, SOLUTION INTRAMUSCULAR; INTRAVENOUS AS NEEDED
Status: DISCONTINUED | OUTPATIENT
Start: 2022-06-10 | End: 2022-06-10 | Stop reason: HOSPADM

## 2022-06-10 RX ORDER — SODIUM CHLORIDE 0.9 % (FLUSH) 0.9 %
5-40 SYRINGE (ML) INJECTION EVERY 8 HOURS
Status: DISCONTINUED | OUTPATIENT
Start: 2022-06-10 | End: 2022-06-11 | Stop reason: HOSPADM

## 2022-06-10 RX ORDER — PHENYLEPHRINE HCL IN 0.9% NACL 0.4MG/10ML
SYRINGE (ML) INTRAVENOUS AS NEEDED
Status: DISCONTINUED | OUTPATIENT
Start: 2022-06-10 | End: 2022-06-10 | Stop reason: HOSPADM

## 2022-06-10 RX ORDER — CALCIUM CARBONATE 500(1250)
1 TABLET ORAL EVERY 6 HOURS
Qty: 180 TABLET | Refills: 3 | Status: ON HOLD | OUTPATIENT
Start: 2022-06-10 | End: 2022-09-30

## 2022-06-10 RX ORDER — SODIUM CHLORIDE 0.9 % (FLUSH) 0.9 %
5-40 SYRINGE (ML) INJECTION AS NEEDED
Status: DISCONTINUED | OUTPATIENT
Start: 2022-06-10 | End: 2022-06-10 | Stop reason: HOSPADM

## 2022-06-10 RX ORDER — LEVOTHYROXINE SODIUM 175 UG/1
175 TABLET ORAL
Qty: 180 TABLET | Refills: 3 | Status: SHIPPED | OUTPATIENT
Start: 2022-06-10

## 2022-06-10 RX ORDER — SODIUM CHLORIDE, SODIUM LACTATE, POTASSIUM CHLORIDE, CALCIUM CHLORIDE 600; 310; 30; 20 MG/100ML; MG/100ML; MG/100ML; MG/100ML
75 INJECTION, SOLUTION INTRAVENOUS CONTINUOUS
Status: DISCONTINUED | OUTPATIENT
Start: 2022-06-10 | End: 2022-06-10 | Stop reason: HOSPADM

## 2022-06-10 RX ORDER — NORETHINDRONE AND ETHINYL ESTRADIOL 0.5-0.035
KIT ORAL AS NEEDED
Status: DISCONTINUED | OUTPATIENT
Start: 2022-06-10 | End: 2022-06-10 | Stop reason: HOSPADM

## 2022-06-10 RX ORDER — SODIUM CHLORIDE 9 MG/ML
50 INJECTION, SOLUTION INTRAVENOUS CONTINUOUS
Status: DISCONTINUED | OUTPATIENT
Start: 2022-06-10 | End: 2022-06-10 | Stop reason: HOSPADM

## 2022-06-10 RX ORDER — MORPHINE SULFATE 2 MG/ML
2 INJECTION, SOLUTION INTRAMUSCULAR; INTRAVENOUS
Status: DISCONTINUED | OUTPATIENT
Start: 2022-06-10 | End: 2022-06-10 | Stop reason: HOSPADM

## 2022-06-10 RX ORDER — SODIUM CHLORIDE 0.9 % (FLUSH) 0.9 %
5-40 SYRINGE (ML) INJECTION AS NEEDED
Status: DISCONTINUED | OUTPATIENT
Start: 2022-06-10 | End: 2022-06-11 | Stop reason: HOSPADM

## 2022-06-10 RX ORDER — OXYCODONE AND ACETAMINOPHEN 2.5; 325 MG/1; MG/1
1 TABLET ORAL
Qty: 12 TABLET | Refills: 0 | Status: SHIPPED | OUTPATIENT
Start: 2022-06-10 | End: 2022-06-10

## 2022-06-10 RX ORDER — HYDROMORPHONE HYDROCHLORIDE 1 MG/ML
0.2 INJECTION, SOLUTION INTRAMUSCULAR; INTRAVENOUS; SUBCUTANEOUS
Status: DISCONTINUED | OUTPATIENT
Start: 2022-06-10 | End: 2022-06-10 | Stop reason: HOSPADM

## 2022-06-10 RX ORDER — OXYCODONE AND ACETAMINOPHEN 10; 325 MG/1; MG/1
1 TABLET ORAL
Status: DISCONTINUED | OUTPATIENT
Start: 2022-06-10 | End: 2022-06-11 | Stop reason: HOSPADM

## 2022-06-10 RX ORDER — FENTANYL CITRATE 50 UG/ML
25 INJECTION, SOLUTION INTRAMUSCULAR; INTRAVENOUS
Status: COMPLETED | OUTPATIENT
Start: 2022-06-10 | End: 2022-06-10

## 2022-06-10 RX ORDER — LABETALOL HYDROCHLORIDE 5 MG/ML
10 INJECTION, SOLUTION INTRAVENOUS ONCE
Status: COMPLETED | OUTPATIENT
Start: 2022-06-10 | End: 2022-06-10

## 2022-06-10 RX ORDER — ONDANSETRON 2 MG/ML
4 INJECTION INTRAMUSCULAR; INTRAVENOUS AS NEEDED
Status: DISCONTINUED | OUTPATIENT
Start: 2022-06-10 | End: 2022-06-10 | Stop reason: HOSPADM

## 2022-06-10 RX ORDER — ONDANSETRON 2 MG/ML
4 INJECTION INTRAMUSCULAR; INTRAVENOUS
Status: DISCONTINUED | OUTPATIENT
Start: 2022-06-10 | End: 2022-06-11 | Stop reason: HOSPADM

## 2022-06-10 RX ORDER — MIDAZOLAM HYDROCHLORIDE 1 MG/ML
0.5 INJECTION, SOLUTION INTRAMUSCULAR; INTRAVENOUS
Status: DISCONTINUED | OUTPATIENT
Start: 2022-06-10 | End: 2022-06-10 | Stop reason: HOSPADM

## 2022-06-10 RX ORDER — FENTANYL CITRATE 50 UG/ML
50 INJECTION, SOLUTION INTRAMUSCULAR; INTRAVENOUS AS NEEDED
Status: DISCONTINUED | OUTPATIENT
Start: 2022-06-10 | End: 2022-06-10 | Stop reason: HOSPADM

## 2022-06-10 RX ORDER — SODIUM CHLORIDE 0.9 % (FLUSH) 0.9 %
5-40 SYRINGE (ML) INJECTION EVERY 8 HOURS
Status: DISCONTINUED | OUTPATIENT
Start: 2022-06-10 | End: 2022-06-10 | Stop reason: HOSPADM

## 2022-06-10 RX ORDER — OXYCODONE AND ACETAMINOPHEN 5; 325 MG/1; MG/1
1 TABLET ORAL
Qty: 12 TABLET | Refills: 0 | Status: SHIPPED | OUTPATIENT
Start: 2022-06-10 | End: 2022-06-13

## 2022-06-10 RX ORDER — OXYCODONE AND ACETAMINOPHEN 5; 325 MG/1; MG/1
1 TABLET ORAL
Status: DISCONTINUED | OUTPATIENT
Start: 2022-06-10 | End: 2022-06-11 | Stop reason: HOSPADM

## 2022-06-10 RX ORDER — ACETAMINOPHEN 325 MG/1
650 TABLET ORAL
Status: DISCONTINUED | OUTPATIENT
Start: 2022-06-10 | End: 2022-06-11 | Stop reason: HOSPADM

## 2022-06-10 RX ORDER — DEXMEDETOMIDINE HYDROCHLORIDE 100 UG/ML
INJECTION, SOLUTION INTRAVENOUS AS NEEDED
Status: DISCONTINUED | OUTPATIENT
Start: 2022-06-10 | End: 2022-06-10 | Stop reason: HOSPADM

## 2022-06-10 RX ORDER — FENTANYL CITRATE 50 UG/ML
INJECTION, SOLUTION INTRAMUSCULAR; INTRAVENOUS AS NEEDED
Status: DISCONTINUED | OUTPATIENT
Start: 2022-06-10 | End: 2022-06-10 | Stop reason: HOSPADM

## 2022-06-10 RX ORDER — ROCURONIUM BROMIDE 10 MG/ML
INJECTION, SOLUTION INTRAVENOUS AS NEEDED
Status: DISCONTINUED | OUTPATIENT
Start: 2022-06-10 | End: 2022-06-10 | Stop reason: HOSPADM

## 2022-06-10 RX ORDER — CALCITRIOL 0.25 UG/1
0.25 CAPSULE ORAL DAILY
Qty: 30 CAPSULE | Refills: 3 | Status: ON HOLD | OUTPATIENT
Start: 2022-06-10 | End: 2022-09-30

## 2022-06-10 RX ORDER — ONDANSETRON 2 MG/ML
INJECTION INTRAMUSCULAR; INTRAVENOUS AS NEEDED
Status: DISCONTINUED | OUTPATIENT
Start: 2022-06-10 | End: 2022-06-10 | Stop reason: HOSPADM

## 2022-06-10 RX ORDER — SUCCINYLCHOLINE CHLORIDE 20 MG/ML
INJECTION INTRAMUSCULAR; INTRAVENOUS AS NEEDED
Status: DISCONTINUED | OUTPATIENT
Start: 2022-06-10 | End: 2022-06-10 | Stop reason: HOSPADM

## 2022-06-10 RX ORDER — DEXTROSE, SODIUM CHLORIDE, AND POTASSIUM CHLORIDE 5; .45; .15 G/100ML; G/100ML; G/100ML
25 INJECTION INTRAVENOUS CONTINUOUS
Status: DISCONTINUED | OUTPATIENT
Start: 2022-06-10 | End: 2022-06-11 | Stop reason: HOSPADM

## 2022-06-10 RX ORDER — LIDOCAINE HYDROCHLORIDE AND EPINEPHRINE 10; 10 MG/ML; UG/ML
INJECTION, SOLUTION INFILTRATION; PERINEURAL AS NEEDED
Status: DISCONTINUED | OUTPATIENT
Start: 2022-06-10 | End: 2022-06-10 | Stop reason: HOSPADM

## 2022-06-10 RX ORDER — HYDRALAZINE HYDROCHLORIDE 20 MG/ML
20 INJECTION INTRAMUSCULAR; INTRAVENOUS ONCE
Status: COMPLETED | OUTPATIENT
Start: 2022-06-10 | End: 2022-06-10

## 2022-06-10 RX ORDER — OXYCODONE AND ACETAMINOPHEN 5; 325 MG/1; MG/1
1 TABLET ORAL AS NEEDED
Status: DISCONTINUED | OUTPATIENT
Start: 2022-06-10 | End: 2022-06-10 | Stop reason: HOSPADM

## 2022-06-10 RX ORDER — PROPOFOL 10 MG/ML
INJECTION, EMULSION INTRAVENOUS AS NEEDED
Status: DISCONTINUED | OUTPATIENT
Start: 2022-06-10 | End: 2022-06-10 | Stop reason: HOSPADM

## 2022-06-10 RX ORDER — MORPHINE SULFATE 2 MG/ML
2 INJECTION, SOLUTION INTRAMUSCULAR; INTRAVENOUS ONCE
Status: COMPLETED | OUTPATIENT
Start: 2022-06-10 | End: 2022-06-10

## 2022-06-10 RX ORDER — FERROUS SULFATE, DRIED 160(50) MG
1 TABLET, EXTENDED RELEASE ORAL EVERY 6 HOURS
Status: DISCONTINUED | OUTPATIENT
Start: 2022-06-10 | End: 2022-06-11 | Stop reason: HOSPADM

## 2022-06-10 RX ORDER — LIDOCAINE HYDROCHLORIDE 10 MG/ML
0.1 INJECTION, SOLUTION EPIDURAL; INFILTRATION; INTRACAUDAL; PERINEURAL AS NEEDED
Status: DISCONTINUED | OUTPATIENT
Start: 2022-06-10 | End: 2022-06-10 | Stop reason: HOSPADM

## 2022-06-10 RX ORDER — LIDOCAINE HYDROCHLORIDE 20 MG/ML
INJECTION, SOLUTION EPIDURAL; INFILTRATION; INTRACAUDAL; PERINEURAL AS NEEDED
Status: DISCONTINUED | OUTPATIENT
Start: 2022-06-10 | End: 2022-06-10 | Stop reason: HOSPADM

## 2022-06-10 RX ADMIN — ROCURONIUM BROMIDE 5 MG: 10 SOLUTION INTRAVENOUS at 09:35

## 2022-06-10 RX ADMIN — HYDRALAZINE HYDROCHLORIDE 10 MG: 20 INJECTION INTRAMUSCULAR; INTRAVENOUS at 11:45

## 2022-06-10 RX ADMIN — SODIUM CHLORIDE, POTASSIUM CHLORIDE, SODIUM LACTATE AND CALCIUM CHLORIDE 75 ML/HR: 600; 310; 30; 20 INJECTION, SOLUTION INTRAVENOUS at 08:12

## 2022-06-10 RX ADMIN — CALCIUM CARBONATE-VITAMIN D TAB 500 MG-200 UNIT 1 TABLET: 500-200 TAB at 13:49

## 2022-06-10 RX ADMIN — ONDANSETRON HYDROCHLORIDE 4 MG: 2 INJECTION, SOLUTION INTRAMUSCULAR; INTRAVENOUS at 10:31

## 2022-06-10 RX ADMIN — PROPOFOL 50 MG: 10 INJECTION, EMULSION INTRAVENOUS at 09:36

## 2022-06-10 RX ADMIN — FENTANYL CITRATE 25 MCG: 50 INJECTION, SOLUTION INTRAMUSCULAR; INTRAVENOUS at 10:12

## 2022-06-10 RX ADMIN — FENTANYL CITRATE 25 MCG: 50 INJECTION, SOLUTION INTRAMUSCULAR; INTRAVENOUS at 11:46

## 2022-06-10 RX ADMIN — PROPOFOL 50 MG: 10 INJECTION, EMULSION INTRAVENOUS at 09:41

## 2022-06-10 RX ADMIN — POTASSIUM CHLORIDE, DEXTROSE MONOHYDRATE AND SODIUM CHLORIDE 25 ML/HR: 150; 5; 450 INJECTION, SOLUTION INTRAVENOUS at 13:49

## 2022-06-10 RX ADMIN — PROPOFOL 50 MG: 10 INJECTION, EMULSION INTRAVENOUS at 10:12

## 2022-06-10 RX ADMIN — FENTANYL CITRATE 100 MCG: 50 INJECTION, SOLUTION INTRAMUSCULAR; INTRAVENOUS at 09:35

## 2022-06-10 RX ADMIN — DEXMEDETOMIDINE HYDROCHLORIDE 10 MCG: 100 INJECTION, SOLUTION, CONCENTRATE INTRAVENOUS at 10:53

## 2022-06-10 RX ADMIN — FENTANYL CITRATE 25 MCG: 50 INJECTION, SOLUTION INTRAMUSCULAR; INTRAVENOUS at 11:12

## 2022-06-10 RX ADMIN — CALCIUM CARBONATE-VITAMIN D TAB 500 MG-200 UNIT 1 TABLET: 500-200 TAB at 23:16

## 2022-06-10 RX ADMIN — OXYCODONE AND ACETAMINOPHEN 1 TABLET: 10; 325 TABLET ORAL at 18:53

## 2022-06-10 RX ADMIN — EPHEDRINE SULFATE 10 MG: 50 INJECTION INTRAVENOUS at 10:20

## 2022-06-10 RX ADMIN — PROPOFOL 150 MG: 10 INJECTION, EMULSION INTRAVENOUS at 09:35

## 2022-06-10 RX ADMIN — OXYCODONE AND ACETAMINOPHEN 1 TABLET: 10; 325 TABLET ORAL at 23:16

## 2022-06-10 RX ADMIN — Medication 80 MCG: at 10:31

## 2022-06-10 RX ADMIN — SUCCINYLCHOLINE CHLORIDE 160 MG: 20 INJECTION, SOLUTION INTRAMUSCULAR; INTRAVENOUS at 09:35

## 2022-06-10 RX ADMIN — FENTANYL CITRATE 25 MCG: 50 INJECTION, SOLUTION INTRAMUSCULAR; INTRAVENOUS at 11:29

## 2022-06-10 RX ADMIN — SODIUM CHLORIDE, PRESERVATIVE FREE 10 ML: 5 INJECTION INTRAVENOUS at 22:00

## 2022-06-10 RX ADMIN — OXYCODONE AND ACETAMINOPHEN 1 TABLET: 5; 325 TABLET ORAL at 13:49

## 2022-06-10 RX ADMIN — FENTANYL CITRATE 25 MCG: 50 INJECTION, SOLUTION INTRAMUSCULAR; INTRAVENOUS at 12:08

## 2022-06-10 RX ADMIN — HYDRALAZINE HYDROCHLORIDE 10 MG: 20 INJECTION INTRAMUSCULAR; INTRAVENOUS at 11:25

## 2022-06-10 RX ADMIN — CALCIUM CARBONATE-VITAMIN D TAB 500 MG-200 UNIT 1 TABLET: 500-200 TAB at 18:53

## 2022-06-10 RX ADMIN — LIDOCAINE HYDROCHLORIDE 40 MG: 20 INJECTION, SOLUTION EPIDURAL; INFILTRATION; INTRACAUDAL; PERINEURAL at 09:35

## 2022-06-10 RX ADMIN — DEXMEDETOMIDINE HYDROCHLORIDE 5 MCG: 100 INJECTION, SOLUTION, CONCENTRATE INTRAVENOUS at 10:54

## 2022-06-10 RX ADMIN — LABETALOL HYDROCHLORIDE 10 MG: 5 INJECTION INTRAVENOUS at 12:06

## 2022-06-10 RX ADMIN — SODIUM CHLORIDE, PRESERVATIVE FREE 10 ML: 5 INJECTION INTRAVENOUS at 13:50

## 2022-06-10 RX ADMIN — WATER 2 G: 1 INJECTION INTRAMUSCULAR; INTRAVENOUS; SUBCUTANEOUS at 09:43

## 2022-06-10 RX ADMIN — EPHEDRINE SULFATE 5 MG: 50 INJECTION INTRAVENOUS at 10:11

## 2022-06-10 RX ADMIN — MORPHINE SULFATE 2 MG: 2 INJECTION, SOLUTION INTRAMUSCULAR; INTRAVENOUS at 18:53

## 2022-06-10 NOTE — BRIEF OP NOTE
Brief Postoperative Note    Patient: Primitivo Chavarria. YOB: 1965  MRN: [id number]    Date of Procedure: 10 Consuelo 2022  Pre-operative Diagnosis:   60-year-old euthyroid male with dominant 1.6 cm right thyroid nodule with FNA showing findings suspicious for malignancy  Post-operative Diagnosis:  60-year-old euthyroid male with dominant 1.6 cm right thyroid nodule with FNA showing findings suspicious for malignancy  Procedure(s):   Neck exploration   Total Thyroidectomy  Laryngeal nerve monitoring  History:  60-year-old euthyroid male with dominant 1.6 cm right thyroid nodule with FNA showing findings suspicious for malignancy  PMHx: Alfonsos Disease, Arthritis, Chest Pain, Chronic Back Pain, Chronic Insomnia, GERD, HLD, Hypogonadism, Hypothyroidism Kidney Stones, Low Testosterone, Opioid Dependence, Severe Anxiety, Vit D Deficiency    No palpable cervical adenopathy. No history of head or neck radiation. No family history of endocrine cancer    TSH 0.33-0.60 uIU/mL  04/05/2022  INDICATION: Nontoxic single thyroid nodule  TECHNIQUE: The risks and benefits of the procedure were discussed with the patient. Written consent was obtained. Preliminary ultrasound imaging of the neck again demonstrates a 1.6 cm solid nodule in the right thyroid lobe. The patient was prepped and draped in sterile fashion. 1% lidocaine was injected locally. 5 passes into the lesion were made under ultrasound guidance using 25-gauge needles. Specimens were transmitted to pathology. The patient tolerated the procedure well. There were no immediate complications. IMPRESSION: Successful ultrasound guided fine needle aspiration of dominant solid nodule in the right thyroid lobe. Pathology results are pending. 04/19/2022  FNA Pathology Result  Right thyroid nodule, 1.6 cm  AFIRMA GSC: Suspicious  Risk of Malignancy 50%  12/02/2021  EXAM: US THYROID/PARATHYROID/SOFT TISS   INDICATION: Thyroid nodule.   COMPARISON: None.  TECHNIQUE: Real-time sonography of the thyroid gland was performed with a high frequency linear transducer. Multiple static images were obtained. FINDINGS:  The thyroid is homogeneous in echotexture with a 1.4 x 1.3 x 1.2 cm isoechoic discrete homogeneous nodule without calcifications or altered vascularity. There is no adenopathy. The right lobe measures 5.2 x 2.2 x 2.0 cm and the left lobe measures 5.4 x 1.9 x 1.7 cm. The isthmus measures 0.5 cm. IMPRESSION: 1.3 cm homogeneous right thyroid nodule. Surgeon(s) and Role:   Panel 1:   * Lino Acosta MD - Co-surgeon  * Janessa Becerra MD, ANAM FACS - Co-surgeon   Anesthesia: General   Urine output: Not documented  Estimated Blood Loss: 2 ml   IVF: 800 ml crystalloid             Drains: None  Patient in room at 0930 hours  Antibiotic prophylaxis ANCEF 2.0 gm at 0943 hours  Preliminary time out at 0944 hours  Beta blocker not indicated  Prayer at 0948 hours  Time out for surgery at 0948 hours    (Correct patient, operative site and procedure confirmed, along with having the necessary equipment on hand to perform the operation safely)  Start of surgery at 0948 hours   End of surgery at 1033 hours   VTE prophylaxis with bilateral lower extremity compression devices   Pressure points padded   Sponge, sharp and instrument count: Correct  Procedure (s) performed:   Neck exploration   Total Thyroidectomy  Laryngeal nerve monitoring  Specimens: Total Thyroidectomy - single short stitch right superior thyroid pole; double stitch on the anterior isthmus  Findings:  1. The right lobe measures 5.0 x 3.5 x 2.5 cm.   2. The left lobe measures 5.5 x 3.0 x 1.5 cm. 3. The isthmus measures 0.5 cm. 4. Dominant, solid, encapsulated 1.6 x 1.3 cm right thyroid nodule without suspicious findings. 5. No findings suspicious for malignancy. 6. No palpable central or lateral neck adenopathy.    7. Two normal appearing right parathyroid glands identified and preserved  8. Two normal appearing left parathyroid glands identified and preserved   9. Left and right recurrent laryngeal nerves identified and carefully preserved throughout the entire course of the operation and confirmed to be structurally and functionally intact - audible signal attained with nerve stimulator / NIM system when applied to the left and right recurrent laryngeal nerves   10. Excellent hemostasis confirmed at end of operation    Pre-op holding area:   Patient seen in pre-operative holding area. Operative site marked. Patient was informed of the indications, nature, risks, benefits, alternatives and expected outcomes of the proposed operation:   Neck Exploration,   Laryngeal Neuromonitoring,   Total thyroidectomy. The patient voiced understanding of the aforesaid and provided informed consent to proceed. The patient asked pertinent questions, all of which were answered to his apparent satisfaction. Specimens:   ID Type Source Tests Collected by Time Destination   1 : Total Thyroidectomy Specimen Fresh Thyroid  Joyce Zuleta MD 6/10/2022 1023 Pathology      Operative Procedure: The patient was escorted to the operating room. Upon arrival to the operative room, the patient, procedure, and operative site were confirmed via a pre-operative time-out; all in attendance agreed. The patient was placed in the supine position and general anesthesia induced without incident. General endotracheal anesthesia was induced with endotracheal placement of NIM tube to facilitate laryngeal neuro-monitoring. Prophylactic antibiotic (ANCEF 2.0 gm) was administered prior to the procedure. Beta blockade not indicated. With the patient in the supine position the arms were tucked, a pillow was placed behind the knees and the heels padded; the neck was slightly extended, and table elevated to 30 degrees. A roll was placed behind the scapulae to create mild degree of neck extension.   Sternal subcutaneous NIM monitoring system leads were placed. Pressure ulcer prophylaxis was in effect with pressure point padding. VTE prophylaxis was also in effect with lower extremity mechanical compression devices. Sterile anterior neck prep (Chlorhexidine - alcohol) and drape. We prayed for our patient, and we repeated the TIME OUT prior to commencing the operation to confirm the    correct patient,    correct operative site,    correct operative procedure and    necessary equipment on hand to conduct the operation safely. Local anesthesia (50:50 mix of 1% LIDO w/ EPI - 5 ml) infiltrated subcutaneously at site of proposed anterior cervical skin incision. Low anterior cervical incision was made, 7 cm in length. Limited sub-platysmal flaps were created. Strap musculature was  in the midline. The right lateral thyroid recess was developed using gentle blunt dissection. The right thyroid lobe was gently lifted anteriorly and mobilized medially to the left. The right middle thyroid vein was isolated controlled, ligated and divided with Harmonic scalpel. The space was developed between the right cricothyroid muscle and the medial aspect of right superior thyroid pole. The right superior pole vessels (right superior thyroid artery and right superior thyroid vein) were meticulously controlled (with Harmonic scalpel division) directly on the thyroid capsule to avoid injury to the right superior laryngeal nerve's external branch. The right recurrent laryngeal nerve was identified in the right trachea-esophageal groove in proximity to the right inferior thyroid artery and right inferior thyroid vein. The branches of the right inferior thyroid artery and right inferior thyroid vein were carefully dissected while maintaining the encountered right recurrent laryngeal nerve out of harms way.    The right inferior thyroid artery and right inferior thyroid vein were controlled directly on the thyroid capsule with Harmonic scalpel to avoid injury to the right recurrent laryngeal nerve, which was meticulously preserved intact. A normal appearing right inferior parathyroid gland was identified anterior and medial to the right inferior thyroid artery and right recurrent laryngeal nerve. The right inferior parathyroid gland was carefully preserved with its vasculature intact. Diligent search for the right superior parathyroid gland superior to the middle thyroid vein and posterior and lateral to the right recurrent laryngeal nerve identified a normal appearing right superior parathyroid gland. The right superior parathyroid gland was carefully preserved with its vasculature intact. There is a dominant, solid, encapsulated right thyroid nodule measuring 1.6 x 1.3 cm, without suspicious gross morphological finding. The tubercle of Zukerkandl of the right thyroid was identified and gently dissected free of both identified branches of the right recurrent laryngeal nerve. Both terminal right recurrent laryngeal nerve branches were identified and meticulously preserved, as they were seen terminating near the right cricothyroid joint. The right thyroid lobe was further mobilized with care to the left. The attachments of the posterior right thyroid lobe and isthmus to the trachea were divided taking care to avoid injury to the terminal branches of the right recurrent laryngeal nerve. The structural and functional integrity of the right recurrent laryngeal nerve was confirmed with the nerve stimulator (NIM system), as a prominent, audible, biphasic signal was attained with stimulation of the right recurrent laryngeal nerve and both its branches. Excellent hemostasis was confirmed along with normal and viable appearing/well perfused right superior and right inferior parathyroid glands. Attention was then directed to the left side of the neck.   A plane of dissection was developed between the posterior surface of the strap musculature (sternothyroid muscle) and the anterior surface of the left thyroid lobe. The left lateral thyroid recess was developed using gentle blunt dissection. The left thyroid lobe was gently lifted anteriorly. The left middle thyroid vein was isolated. The left middle thyroid vein was controlled, ligated, and divided with Harmonic scalpel on the left thyroid capsule. We then focused our attention on the left superior thyroid pole. The space was developed between the left cricothyroid muscle and medial aspect of the left superior thyroid pole. Recognizing that variations exist between the course of the superior laryngeal nerves and the first branch of the external carotid artery (the superior thyroid artery), we took great care to dissect the superior thyroid artery and its branches prior to ligation. We were careful to keep the external branch of the superior laryngeal nerve (EBSLN, which provides motor fibers to the cricothyroid muscle and the inferior pharyngeal constrictor muscle) out of harms way. The left thyroid lobe was gently retracted in a caudal and lateral direction. The left superior pole vessels (left superior thyroid artery and left superior thyroid vein) were skeletonized and meticulously controlled individually (with Harmonic scalpel division) directly on the thyroid capsule to avoid injury to the left superior laryngeal nerve's external branch (EBSLN). We identified the left recurrent laryngeal nerve between the trachea and esophagus. The left recurrent laryngeal nerve was palpable and visually identified in the left tracheo-esophageal groove in proximity to the left inferior thyroid artery and left inferior thyroid vein. The left recurrent laryngeal nerves terminal branches provide motor innervation to all intrinsic muscles of the larynx except the cricothyroid muscle, which is innervated by EBSLN.      The branches of the left inferior thyroid artery and branches of the left inferior thyroid vein were carefully dissected while maintaining the encountered left recurrent laryngeal nerve in view and out of harms way. The left inferior thyroid artery sends branches to both the superior and inferior parathyroid glands. The superior parathyroid gland (usually encountered at the level of the Ligament of Sanders, lateral to the recurrent laryngeal nerve) is more posterior in position relative to the more anteriorly situated inferior parathyroid gland (usually encountered anterior to the recurrent laryngeal nerve, inferior to where the nerve crosses the inferior thyroid artery). The left inferior thyroid artery branches were controlled directly on the thyroid capsule after its identified parathyroid branches were given off. Branches of the left inferior thyroid vein were controlled on the thyroid capsule with Harmonic scalpel to avoid injury to the left inferior parathyroid gland that was identified anterior and medial to the left recurrent laryngeal nerve. Both the left inferior parathyroid gland and left recurrent laryngeal nerve were meticulously preserved intact and maintained out of harms way. The left inferior parathyroid gland was normal in size and appearance, and well perfused with its vasculature carefully preserved intact. Diligent search for the left superior parathyroid gland, superior to the middle thyroid vein, and posterior and lateral to the left recurrent laryngeal nerve, identified a normal appearing, well perfused left superior parathyroid gland, which was also carefully maintained out of harms way. The left superior parathyroid gland was carefully preserved on its vascular pedicle. The tubercle of Zukerkandl of the left thyroid was identified and gently dissected free of both identified branches of the left recurrent laryngeal nerve.  We recognize that this specific anatomic area, where the left recurrent laryngeal nerve most closely approximates the thyroid gland and the terminal ascending branches of the inferior thyroid artery, is where the recurrent nerve is at greatest risk of injury. The terminal branches of the left recurrent nerve may be anterior, posterior, or in between the branches of the ascending left inferior thyroid artery. The Ligament of Toy Copper was identified at the posterolateral portion of the thyroid lobe just caudal to the cricoid cartilage, and it was gently dissected to identify its relationship to the terminal aspects of the left recurrent laryngeal nerve, which was found to be passing posterior to the Ligament of Sanders to enter the larynx. The terminal branches of the left inferior thyroid artery were meticulously controlled with bi-polar cautery taking care to spare the adjacent left recurrent laryngeal nerve. Both terminal branches of the left recurrent laryngeal nerve were identified and meticulously preserved, as they were seen passing under the inferior constrictor muscle, and through the cricothyroid membrane near the left cricothyroid joint, just superior to the cricoid cartilage, to enter the posterior and medial aspect of the larynx. The left thyroid lobe was further mobilized with care to the right. The attachments of the posterior left thyroid lobe and isthmus to the pre-tracheal fascia were divided with precision taking great care to avoid injury to the terminal branches of the left recurrent laryngeal nerve. The total thyroidectomy specimen was delivered, oriented for pathological processing (single stitch right superior thyroid pole, double stitch anterior isthmus), and submitted for permanent pathology analysis. The structural and functional integrity of the left recurrent laryngeal nerve was confirmed with the nerve stimulator ([email or web address] system), as a prominent, audible biphasic signal was attained with stimulation of the left recurrent nerve in its proximal, mid, and distal cervical portions.   Excellent hemostasis was confirmed along with normal superior, and normal inferior viable appearing/well perfused left superior and inferior parathyroid glands. The structural and functional integrity of the right recurrent laryngeal nerve was once again confirmed with the nerve stimulator ([email or web address] System), as a prominent, audible, biphasic signal was once again attained when both branches of the right recurrent laryngeal nerves were stimulated. Palpation of the right neck, and diligent search of the central and left lateral areas of the neck was unrevealing; specifically, there was no apparent adenopathy in either the right or left lateral (Level II, III, IV) neck, or the central compartment (Level VI and VII). Excellent hemostasis was confirmed along with two viable appearing left parathyroid glands and two viable appearing right parathyroid glands. **  Excellent hemostasis in the operative field was re-confirmed under Valsalva maneuver. Surgicel was placed in the operative field as a hemostatic adjunct. The strap musculature (sternothyroid and sternohyoid muscles) was re-approximated in the midline with running 3-0 Vicryl suture. The platysma muscle was reconstituted with running absorbable (3-0 Vicryl) suture. The wound was irrigated with saline solution   The skin incision was closed with running absorbable subcuticular suture (4-0 Monocryl). This was an uncomplicated operation with minimal blood loss. The patient was extubated uneventfully in the operating room and transferred to the PACU in stable condition with aspiration precautions in effect   Complications: None   Implants: None  Disposition:   To PACU extubated and in stable condition with aspiration precautions in effect   Kyle Killian MD, MBA FACS   Sabino Bates MD    Electronically Signed by Kyle Killian MD on 6/10/2022 at 10:48 AM

## 2022-06-10 NOTE — ANESTHESIA POSTPROCEDURE EVALUATION
Procedure(s):  TOTAL THYROIDECTOMY. general    Anesthesia Post Evaluation      Multimodal analgesia: multimodal analgesia used between 6 hours prior to anesthesia start to PACU discharge  Patient location during evaluation: PACU  Patient participation: complete - patient participated  Level of consciousness: awake  Pain management: adequate  Airway patency: patent  Anesthetic complications: no  Cardiovascular status: acceptable  Respiratory status: acceptable  Hydration status: acceptable  Comments: Seen, no complaints   Post anesthesia nausea and vomiting:  none  Final Post Anesthesia Temperature Assessment:  Normothermia (36.0-37.5 degrees C)      INITIAL Post-op Vital signs:   Vitals Value Taken Time   /90 06/10/22 1226   Temp 36.7 °C (98.1 °F) 06/10/22 1056   Pulse 66 06/10/22 1227   Resp 16 06/10/22 1227   SpO2 95 % 06/10/22 1227   Vitals shown include unvalidated device data.

## 2022-06-10 NOTE — PERIOP NOTES
Verbal report given to Licina(name) on NAME  being transferred to (unit) for routine progression of care       Report consisted of patients Situation, Background, Assessment and   Recommendations(SBAR). Information from the following report(s) Procedure Summary, MAR and Recent Results was reviewed with the receiving nurse. Lines:       Opportunity for questions and clarification was provided.       Patient transported with:   Registered Nurse

## 2022-06-10 NOTE — ANESTHESIA PREPROCEDURE EVALUATION
Relevant Problems   RENAL FAILURE   (+) Kidney stones      ENDOCRINE   (+) Hypothyroidism   Anesthetic History   No history of anesthetic complications            Review of Systems / Medical History  Patient summary reviewed, nursing notes reviewed and pertinent labs reviewed    Pulmonary          Smoker         Neuro/Psych   Within defined limits           Cardiovascular  Within defined limits                     GI/Hepatic/Renal     GERD           Endo/Other      Hypothyroidism       Other Findings   Comments: Opioid dependence  Halifax's disease           Physical Exam    Airway  Mallampati: II  TM Distance: > 6 cm  Neck ROM: normal range of motion   Mouth opening: Normal     Cardiovascular  Regular rate and rhythm,  S1 and S2 normal,  no murmur, click, rub, or gallop             Dental  No notable dental hx       Pulmonary  Breath sounds clear to auscultation               Abdominal  GI exam deferred       Other Findings            Anesthetic Plan    ASA: 2  Anesthesia type: general          Induction: Intravenous  Anesthetic plan and risks discussed with: Patient              Anesthetic History   No history of anesthetic complications            Review of Systems / Medical History  Patient summary reviewed, nursing notes reviewed and pertinent labs reviewed    Pulmonary          Smoker      Comments: 40 pack years   Neuro/Psych         Psychiatric history    Comments: 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, now off methadone  ANXIETY Cardiovascular              Hyperlipidemia    Exercise tolerance: >4 METS     GI/Hepatic/Renal     GERD: well controlled    Renal disease: stones       Endo/Other      Hypothyroidism  Arthritis    Comments: Endocrine disease (E34.9)  alfonso's disease  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)       Other Findings              Physical Exam    Airway  Mallampati: II  TM Distance: 4 - 6 cm  Neck ROM: normal range of motion   Mouth opening: Normal     Cardiovascular  Regular rate and rhythm,  S1 and S2 normal,  no murmur, click, rub, or gallop  Rhythm: regular  Rate: normal         Dental    Dentition: Caps/crowns and Implants     Pulmonary  Breath sounds clear to auscultation               Abdominal  GI exam deferred       Other Findings            Anesthetic Plan    ASA: 3  Anesthesia type: general          Induction: Intravenous  Anesthetic plan and risks discussed with: Patient      Stress dose steroids

## 2022-06-10 NOTE — OP NOTES
Date of Procedure: 10 Consuelo 2022  Pre-operative Diagnosis:   28-year-old euthyroid male with dominant 1.6 cm right thyroid nodule with FNA showing findings suspicious for malignancy  Post-operative Diagnosis:  28-year-old euthyroid male with dominant 1.6 cm right thyroid nodule with FNA showing findings suspicious for malignancy  Procedure(s):   Neck exploration   Total Thyroidectomy  Laryngeal nerve monitoring  History:  28-year-old euthyroid male with dominant 1.6 cm right thyroid nodule with FNA showing findings suspicious for malignancy  PMHx: Dunns Disease, Arthritis, Chest Pain, Chronic Back Pain, Chronic Insomnia, GERD, HLD, Hypogonadism, Hypothyroidism Kidney Stones, Low Testosterone, Opioid Dependence, Severe Anxiety, Vit D Deficiency    No palpable cervical adenopathy. No history of head or neck radiation. No family history of endocrine cancer    TSH 0.33-0.60 uIU/mL  04/05/2022  INDICATION: Nontoxic single thyroid nodule  TECHNIQUE: The risks and benefits of the procedure were discussed with the patient. Written consent was obtained. Preliminary ultrasound imaging of the neck again demonstrates a 1.6 cm solid nodule in the right thyroid lobe. The patient was prepped and draped in sterile fashion. 1% lidocaine was injected locally. 5 passes into the lesion were made under ultrasound guidance using 25-gauge needles. Specimens were transmitted to pathology. The patient tolerated the procedure well. There were no immediate complications. IMPRESSION: Successful ultrasound guided fine needle aspiration of dominant solid nodule in the right thyroid lobe. Pathology results are pending. 04/19/2022  FNA Pathology Result  Right thyroid nodule, 1.6 cm  AFIRMA GSC: Suspicious  Risk of Malignancy 50%  12/02/2021  EXAM: US THYROID/PARATHYROID/SOFT TISS   INDICATION: Thyroid nodule. COMPARISON: None. TECHNIQUE: Real-time sonography of the thyroid gland was performed with a high frequency linear transducer. Multiple static images were obtained. FINDINGS:  The thyroid is homogeneous in echotexture with a 1.4 x 1.3 x 1.2 cm isoechoic discrete homogeneous nodule without calcifications or altered vascularity. There is no adenopathy. The right lobe measures 5.2 x 2.2 x 2.0 cm and the left lobe measures 5.4 x 1.9 x 1.7 cm. The isthmus measures 0.5 cm. IMPRESSION: 1.3 cm homogeneous right thyroid nodule. Surgeon(s) and Role:   Panel 1:   * Geovani Fermin MD - Co-surgeon  * Marline Quispe MD, ANAM FACS - Co-surgeon   Anesthesia: General   Urine output: Not documented  Estimated Blood Loss: 2 ml   IVF: 800 ml crystalloid             Drains: None  Patient in room at 0930 hours  Antibiotic prophylaxis ANCEF 2.0 gm at 0943 hours  Preliminary time out at 0944 hours  Beta blocker not indicated  Prayer at 0948 hours  Time out for surgery at 0948 hours    (Correct patient, operative site and procedure confirmed, along with having the necessary equipment on hand to perform the operation safely)  Start of surgery at 0948 hours   End of surgery at 1033 hours   VTE prophylaxis with bilateral lower extremity compression devices   Pressure points padded   Sponge, sharp and instrument count: Correct  Procedure (s) performed:   Neck exploration   Total Thyroidectomy  Laryngeal nerve monitoring  Specimens: Total Thyroidectomy - single short stitch right superior thyroid pole; double stitch on the anterior isthmus  Findings:  1. The right lobe measures 5.0 x 3.5 x 2.5 cm.   2. The left lobe measures 5.5 x 3.0 x 1.5 cm. 3. The isthmus measures 0.5 cm. 4. Dominant, solid, encapsulated 1.6 x 1.3 cm right thyroid nodule without suspicious findings. 5. No findings suspicious for malignancy. 6. No palpable central or lateral neck adenopathy. 7. Two normal appearing right parathyroid glands identified and preserved  8. Two normal appearing left parathyroid glands identified and preserved   9.  Left and right recurrent laryngeal nerves identified and carefully preserved throughout the entire course of the operation and confirmed to be structurally and functionally intact - audible signal attained with nerve stimulator / NIM system when applied to the left and right recurrent laryngeal nerves   10. Excellent hemostasis confirmed at end of operation    Pre-op holding area:   Patient seen in pre-operative holding area. Operative site marked. Patient was informed of the indications, nature, risks, benefits, alternatives and expected outcomes of the proposed operation:   Neck Exploration,   Laryngeal Neuromonitoring,   Total thyroidectomy. The patient voiced understanding of the aforesaid and provided informed consent to proceed. The patient asked pertinent questions, all of which were answered to his apparent satisfaction. Operative Procedure: The patient was escorted to the operating room. Upon arrival to the operative room, the patient, procedure, and operative site were confirmed via a pre-operative time-out; all in attendance agreed. The patient was placed in the supine position and general anesthesia induced without incident. General endotracheal anesthesia was induced with endotracheal placement of NIM tube to facilitate laryngeal neuro-monitoring. Prophylactic antibiotic (ANCEF 2.0 gm) was administered prior to the procedure. Beta blockade not indicated. With the patient in the supine position the arms were tucked, a pillow was placed behind the knees and the heels padded; the neck was slightly extended, and table elevated to 30 degrees. A roll was placed behind the scapulae to create mild degree of neck extension. Sternal subcutaneous NIM monitoring system leads were placed. Pressure ulcer prophylaxis was in effect with pressure point padding. VTE prophylaxis was also in effect with lower extremity mechanical compression devices. Sterile anterior neck prep (Chlorhexidine - alcohol) and drape.    We prayed for our patient, and we repeated the TIME OUT prior to commencing the operation to confirm the    correct patient,    correct operative site,    correct operative procedure and    necessary equipment on hand to conduct the operation safely. Local anesthesia (50:50 mix of 1% LIDO w/ EPI - 5 ml) infiltrated subcutaneously at site of proposed anterior cervical skin incision. Low anterior cervical incision was made, 7 cm in length. Limited sub-platysmal flaps were created. Strap musculature was  in the midline. The right lateral thyroid recess was developed using gentle blunt dissection. The right thyroid lobe was gently lifted anteriorly and mobilized medially to the left. The right middle thyroid vein was isolated controlled, ligated and divided with Harmonic scalpel. The space was developed between the right cricothyroid muscle and the medial aspect of right superior thyroid pole. The right superior pole vessels (right superior thyroid artery and right superior thyroid vein) were meticulously controlled (with Harmonic scalpel division) directly on the thyroid capsule to avoid injury to the right superior laryngeal nerve's external branch. The right recurrent laryngeal nerve was identified in the right trachea-esophageal groove in proximity to the right inferior thyroid artery and right inferior thyroid vein. The branches of the right inferior thyroid artery and right inferior thyroid vein were carefully dissected while maintaining the encountered right recurrent laryngeal nerve out of harms way. The right inferior thyroid artery and right inferior thyroid vein were controlled directly on the thyroid capsule with Harmonic scalpel to avoid injury to the right recurrent laryngeal nerve, which was meticulously preserved intact.   A normal appearing right inferior parathyroid gland was identified anterior and medial to the right inferior thyroid artery and right recurrent laryngeal nerve.  The right inferior parathyroid gland was carefully preserved with its vasculature intact. Diligent search for the right superior parathyroid gland superior to the middle thyroid vein and posterior and lateral to the right recurrent laryngeal nerve identified a normal appearing right superior parathyroid gland. The right superior parathyroid gland was carefully preserved with its vasculature intact. There is a dominant, solid, encapsulated right thyroid nodule measuring 1.6 x 1.3 cm, without suspicious gross morphological finding. The tubercle of Zukerkandl of the right thyroid was identified and gently dissected free of both identified branches of the right recurrent laryngeal nerve. Both terminal right recurrent laryngeal nerve branches were identified and meticulously preserved, as they were seen terminating near the right cricothyroid joint. The right thyroid lobe was further mobilized with care to the left. The attachments of the posterior right thyroid lobe and isthmus to the trachea were divided taking care to avoid injury to the terminal branches of the right recurrent laryngeal nerve. The structural and functional integrity of the right recurrent laryngeal nerve was confirmed with the nerve stimulator (Airu system), as a prominent, audible, biphasic signal was attained with stimulation of the right recurrent laryngeal nerve and both its branches. Excellent hemostasis was confirmed along with normal and viable appearing/well perfused right superior and right inferior parathyroid glands. Attention was then directed to the left side of the neck. A plane of dissection was developed between the posterior surface of the strap musculature (sternothyroid muscle) and the anterior surface of the left thyroid lobe. The left lateral thyroid recess was developed using gentle blunt dissection. The left thyroid lobe was gently lifted anteriorly. The left middle thyroid vein was isolated.  The left middle thyroid vein was controlled, ligated, and divided with Harmonic scalpel on the left thyroid capsule. We then focused our attention on the left superior thyroid pole. The space was developed between the left cricothyroid muscle and medial aspect of the left superior thyroid pole. Recognizing that variations exist between the course of the superior laryngeal nerves and the first branch of the external carotid artery (the superior thyroid artery), we took great care to dissect the superior thyroid artery and its branches prior to ligation. We were careful to keep the external branch of the superior laryngeal nerve (EBSLN, which provides motor fibers to the cricothyroid muscle and the inferior pharyngeal constrictor muscle) out of harms way. The left thyroid lobe was gently retracted in a caudal and lateral direction. The left superior pole vessels (left superior thyroid artery and left superior thyroid vein) were skeletonized and meticulously controlled individually (with Harmonic scalpel division) directly on the thyroid capsule to avoid injury to the left superior laryngeal nerve's external branch (EBSLN). We identified the left recurrent laryngeal nerve between the trachea and esophagus. The left recurrent laryngeal nerve was palpable and visually identified in the left tracheo-esophageal groove in proximity to the left inferior thyroid artery and left inferior thyroid vein. The left recurrent laryngeal nerves terminal branches provide motor innervation to all intrinsic muscles of the larynx except the cricothyroid muscle, which is innervated by EBSLN. The branches of the left inferior thyroid artery and branches of the left inferior thyroid vein were carefully dissected while maintaining the encountered left recurrent laryngeal nerve in view and out of harms way. The left inferior thyroid artery sends branches to both the superior and inferior parathyroid glands.  The superior parathyroid gland (usually encountered at the level of the Ligament of Sanders, lateral to the recurrent laryngeal nerve) is more posterior in position relative to the more anteriorly situated inferior parathyroid gland (usually encountered anterior to the recurrent laryngeal nerve, inferior to where the nerve crosses the inferior thyroid artery). The left inferior thyroid artery branches were controlled directly on the thyroid capsule after its identified parathyroid branches were given off. Branches of the left inferior thyroid vein were controlled on the thyroid capsule with Harmonic scalpel to avoid injury to the left inferior parathyroid gland that was identified anterior and medial to the left recurrent laryngeal nerve. Both the left inferior parathyroid gland and left recurrent laryngeal nerve were meticulously preserved intact and maintained out of harms way. The left inferior parathyroid gland was normal in size and appearance, and well perfused with its vasculature carefully preserved intact. Diligent search for the left superior parathyroid gland, superior to the middle thyroid vein, and posterior and lateral to the left recurrent laryngeal nerve, identified a normal appearing, well perfused left superior parathyroid gland, which was also carefully maintained out of harms way. The left superior parathyroid gland was carefully preserved on its vascular pedicle. The tubercle of Zukerkandl of the left thyroid was identified and gently dissected free of both identified branches of the left recurrent laryngeal nerve. We recognize that this specific anatomic area, where the left recurrent laryngeal nerve most closely approximates the thyroid gland and the terminal ascending branches of the inferior thyroid artery, is where the recurrent nerve is at greatest risk of injury. The terminal branches of the left recurrent nerve may be anterior, posterior, or in between the branches of the ascending left inferior thyroid artery. The Ligament of Germain Turkbe was identified at the posterolateral portion of the thyroid lobe just caudal to the cricoid cartilage, and it was gently dissected to identify its relationship to the terminal aspects of the left recurrent laryngeal nerve, which was found to be passing posterior to the Ligament of Sanders to enter the larynx. The terminal branches of the left inferior thyroid artery were meticulously controlled with bi-polar cautery taking care to spare the adjacent left recurrent laryngeal nerve. Both terminal branches of the left recurrent laryngeal nerve were identified and meticulously preserved, as they were seen passing under the inferior constrictor muscle, and through the cricothyroid membrane near the left cricothyroid joint, just superior to the cricoid cartilage, to enter the posterior and medial aspect of the larynx. The left thyroid lobe was further mobilized with care to the right. The attachments of the posterior left thyroid lobe and isthmus to the pre-tracheal fascia were divided with precision taking great care to avoid injury to the terminal branches of the left recurrent laryngeal nerve. The total thyroidectomy specimen was delivered, oriented for pathological processing (single stitch right superior thyroid pole, double stitch anterior isthmus), and submitted for permanent pathology analysis. The structural and functional integrity of the left recurrent laryngeal nerve was confirmed with the nerve stimulator (NIM system), as a prominent, audible biphasic signal was attained with stimulation of the left recurrent nerve in its proximal, mid, and distal cervical portions. Excellent hemostasis was confirmed along with normal superior, and normal inferior viable appearing/well perfused left superior and inferior parathyroid glands.   The structural and functional integrity of the right recurrent laryngeal nerve was once again confirmed with the nerve stimulator (NIM System), as a prominent, audible, biphasic signal was once again attained when both branches of the right recurrent laryngeal nerves were stimulated. Palpation of the right neck, and diligent search of the central and left lateral areas of the neck was unrevealing; specifically, there was no apparent adenopathy in either the right or left lateral (Level II, III, IV) neck, or the central compartment (Level VI and VII). Excellent hemostasis was confirmed along with two viable appearing left parathyroid glands and two viable appearing right parathyroid glands. **  Excellent hemostasis in the operative field was re-confirmed under Valsalva maneuver. Surgicel was placed in the operative field as a hemostatic adjunct. The strap musculature (sternothyroid and sternohyoid muscles) was re-approximated in the midline with running 3-0 Vicryl suture. The platysma muscle was reconstituted with running absorbable (3-0 Vicryl) suture. The wound was irrigated with saline solution   The skin incision was closed with running absorbable subcuticular suture (4-0 Monocryl). This was an uncomplicated operation with minimal blood loss. The patient was extubated uneventfully in the operating room and transferred to the PACU in stable condition with aspiration precautions in effect   Complications: None   Implants: None  Disposition:   To PACU extubated and in stable condition with aspiration precautions in effect   Raoul Lazo MD, MBA FACS   July Osuna MD

## 2022-06-10 NOTE — PROGRESS NOTES
Misael Salinas provided to patient/representative with verbal explanation of the notice. Time allotted for questions regarding the notice. Patient /representative provided a completed copy of the VOON notice. Copy placed on bedside chart.   Melani Rodriguez, Care Management Assistant

## 2022-06-10 NOTE — PROGRESS NOTES
Patient called attention of nursing staff, stated that he was having difficulty swallowing and increased pressure on the anterior neck. On exam the patients swelling had increased with extention of bruising to the anterior neck. Physician on call contacted and rapid response initiated. Patients SpO2 remained stable throughout event not decreasing below 94%. Patient noted to have plate at bedside with solid food despite having orders for liquid diet. Food container not recognized as being supplied by hospital dietary dept. Patient reminded to leave ice pack in place and to consume cold beverages at bedside as provided by nursing staff.

## 2022-06-10 NOTE — DISCHARGE INSTRUCTIONS
Post Thyroidectomy Instructions    Follow up: with Dr. Jesus Manuel Boone 2 weeks after surgery to have your steristrips removed. Shortly after surgery call [phone] to schedule this appointment.  Eat regular foods.  You may shower in 24 hours. Do not allow direct water pressure on your wound. If water trickles down while washing your hair, allow the wound to dry on its own.  Do not scrub or soak your wound for 2 weeks or 14 days.  No strenuous activity: for 14 days.  No moving more than 15 pounds: for 14 days. Then includes pulling, pushing, tugging, throwing.  There is generally not a lot of pain: with this surgery. Take your pain medication as needed. Most patients, if they do have pain, will have neck stiffness/discomfort. You may have numbness or tingling surrounding the area of your wound. Narcotics can cause constipation; use your Colace if this is the case.  Nausea and vomiting: from lingering effects of general anesthesia usually resolves by the following day. The narcotic pain medication can cause nausea and vomiting. They should be taken with food or fluids to minimize this. Medications that reduce nausea and vomiting can be prescribed by your physician.  Fever above 100.4, redness around wound, pus drainage from wound: call your doctor.  Bleeding: is uncommon (less than 1%). If it does occur your neck will develop a fullness. It is good to take a look at your neck shortly after surgery to see what a baseline appearance is. If there are changes to this, then call your provider. \    CALL [phone] for questions or concerns     Special Instructions for if you had both sides of your thyroid removed or the remainder of your thyroid removed:    o You may be on Calcium ([place]) - it is very important that you take this.   Dr. Jesus Manuel Boone will start a taper at your follow up visit  o If you experience tingling in the hands or around your mouth, your calcium may be dropping, go to the emergency room immediately and tell them you had your thyroid removed.  o You will be started on a dose of thyroid hormone replacement if you did not have high levels of thyroid hormone prior to surgery. Take this every day.

## 2022-06-10 NOTE — PROGRESS NOTES
Patient interviewed and examined with nurse  Patient's wife is at bedside  The patient reports several coughing spells earlier with subsequent neck swelling  He is tolerating liquid and solid intake without problem  No hoarseness, dysarthria, dysphonia, or dysphagia  No shortness of breath/difficulty breathing  No signs or symptoms of expanding operative site hematoma    Awake, alert, fully oriented  The patient appears comfortable, in no acute distress  Vital signs are stable: T 97.6, HR 81, RR 16, /88 SpO2 95% RA  Anterior neck incision clean, dry and intact with steri strips securely in place  Ni-incisional ecchymosis apparent with small ~4 x 5 cm underlying hematoma  No signs or symptoms of airway compromise    Clinically stable on day of surgery s/p neck exploration, laryngeal nerve monitoring, total thyroidectomy. Details of the surgical procedure and operative findings explained to the patient and his wife. All of their stated questions were answered to their voiced satisfaction. The patient and his wife were informed about the signs and symptoms of worsening operative site hematoma that would warrant surgical intervention.     The current plan of care is to limit oral intake to liquids, to continue aspiration precautions, application of ice pack to the anterior neck, and close clinical monitoring for signs and symptoms of expanding hematoma    Allyson Luis MD [facility] MSc FACS

## 2022-06-11 VITALS
BODY MASS INDEX: 28.42 KG/M2 | TEMPERATURE: 99.2 F | OXYGEN SATURATION: 95 % | RESPIRATION RATE: 18 BRPM | HEIGHT: 71 IN | HEART RATE: 89 BPM | SYSTOLIC BLOOD PRESSURE: 127 MMHG | WEIGHT: 203 LBS | DIASTOLIC BLOOD PRESSURE: 79 MMHG

## 2022-06-11 LAB
CALCIUM SERPL-MCNC: 8.6 MG/DL (ref 8.5–10.1)
CALCIUM SERPL-MCNC: 8.6 MG/DL (ref 8.5–10.1)
CALCIUM SERPL-MCNC: 9 MG/DL (ref 8.5–10.1)
MAGNESIUM SERPL-MCNC: 1.6 MG/DL (ref 1.6–2.4)
MAGNESIUM SERPL-MCNC: 1.6 MG/DL (ref 1.6–2.4)
MAGNESIUM SERPL-MCNC: 1.7 MG/DL (ref 1.6–2.4)
PHOSPHATE SERPL-MCNC: 3.8 MG/DL (ref 2.6–4.7)
PHOSPHATE SERPL-MCNC: 4 MG/DL (ref 2.6–4.7)
PHOSPHATE SERPL-MCNC: 4.3 MG/DL (ref 2.6–4.7)

## 2022-06-11 PROCEDURE — 82310 ASSAY OF CALCIUM: CPT

## 2022-06-11 PROCEDURE — 74011000250 HC RX REV CODE- 250: Performed by: OTOLARYNGOLOGY

## 2022-06-11 PROCEDURE — 84100 ASSAY OF PHOSPHORUS: CPT

## 2022-06-11 PROCEDURE — G0378 HOSPITAL OBSERVATION PER HR: HCPCS

## 2022-06-11 PROCEDURE — 74011250637 HC RX REV CODE- 250/637: Performed by: OTOLARYNGOLOGY

## 2022-06-11 PROCEDURE — 83735 ASSAY OF MAGNESIUM: CPT

## 2022-06-11 PROCEDURE — 36415 COLL VENOUS BLD VENIPUNCTURE: CPT

## 2022-06-11 RX ADMIN — CALCIUM CARBONATE-VITAMIN D TAB 500 MG-200 UNIT 1 TABLET: 500-200 TAB at 11:44

## 2022-06-11 RX ADMIN — OXYCODONE AND ACETAMINOPHEN 1 TABLET: 10; 325 TABLET ORAL at 15:57

## 2022-06-11 RX ADMIN — SODIUM CHLORIDE, PRESERVATIVE FREE 20 ML: 5 INJECTION INTRAVENOUS at 14:22

## 2022-06-11 RX ADMIN — CALCIUM CARBONATE-VITAMIN D TAB 500 MG-200 UNIT 1 TABLET: 500-200 TAB at 07:15

## 2022-06-11 RX ADMIN — OXYCODONE AND ACETAMINOPHEN 1 TABLET: 10; 325 TABLET ORAL at 07:15

## 2022-06-11 RX ADMIN — OXYCODONE AND ACETAMINOPHEN 1 TABLET: 10; 325 TABLET ORAL at 03:36

## 2022-06-11 RX ADMIN — LEVOTHYROXINE SODIUM 175 MCG: 0.07 TABLET ORAL at 08:45

## 2022-06-11 NOTE — PROGRESS NOTES
Spiritual Care Assessment/Progress Note  [address]      NAME: Leonel Paniagua. MRN: [id number]  AGE: 55 y.o.  SEX: male  Jainism Affiliation: Gnosticist   Language: English     6/11/2022     Total Time (in minutes): 16     Spiritual Assessment begun in [facility] through conversation with:         [x]Patient        [x] Family    [] Friend(s)        Reason for Consult: Advance medical directive consult     Spiritual beliefs: (Please include comment if needed)     - Identifies with a adalgisa tradition:    Gnosticist     - Supported by a adalgisa community:            - Claims no spiritual orientation:           - Seeking spiritual identity:                - Adheres to an individual form of spirituality:           - Not able to assess:                           Identified resources for coping:      [] Prayer                               [] Music                  [] Guided Imagery     [x] Family/friends                 [] Pet visits     [] Devotional reading                         [] Unknown     [] Other:                                               Interventions offered during this visit: (See comments for more details)    Patient Interventions: Advance medical directive consult,Catharsis/review of pertinent events in supportive environment,Initial/Spiritual assessment, patient floor,Prayer (assurance of)     Family/Friend(s): Catharsis/review of pertinent events in supportive environment,Prayer (assurance of)     Plan of Care:     [] Support spiritual and/or cultural needs    [] Support AMD and/or advance care planning process      [] Support grieving process   [] Coordinate Rites and/or Rituals    [] Coordination with community clergy   [] No spiritual needs identified at this time   [] Detailed Plan of Care below (See Comments)  [] Make referral to Music Therapy  [] Make referral to Pet Therapy     [] Make referral to Addiction services  [] Make referral to [facility]  [] Make referral to Spiritual Care Partner  [] No future visits requested        [x] Contact Spiritual Care for further referrals     Comments: Provided support to this pt in [address] [place] [address]. Reviewed pt's chart prior to this visit. Pt's wife was present during this visit. Offered assistance to pt as a result of AMD consult. Pt stated he already had the document but wasn't interested in completing AMD at this time. Pt didn't have any questions concerning AMD at this time. [facility] engaged pt in life review and offered listening presence. [facility] assured pt of prayer and concluded by affirming ongoing availability of support. Carmella Bee MDiv.  Staff   Request  Support/Spiritual Care Services on [phone])

## 2022-06-11 NOTE — PROGRESS NOTES
Bedside shift change report given to Randolph Turner RN (oncoming nurse) by Finn Ny RN (offgoing nurse). Report included the following information SBAR.

## 2022-06-11 NOTE — PROGRESS NOTES
POD 1 Total thyroidectomy  Here to reassess neck 4 hours from concerns for swelling - neck is still very soft - tender to palpation - but very soft - able to recline - swallowing well and breathing well  Continue to monitor in house  Wes Vargas MD

## 2022-06-11 NOTE — PROGRESS NOTES
Repeat calcium level drawn at 1414 pm returned at 8.6 which was the same as results from earlier this AM, (0713 am). Per MD orders, pt is to be discharged home. Pt denies swallowing difficulty or resp difficulty. Anterior neck inc remains intact with steri strips, bruising and some swelling but has not changed since early this shift when first assessment done. Pt responds that throat/ neck is sore but no other c/o voiced. Pt wife at bedside. Pt aware of things to report or return to hospital if occurs and to  new prescriptions from his preferred pharmacy. Pt aware of new medications and how to take them. Pt has all personal belongings at time of discharge. writtne and verbal instructions provided for pt and his wife.

## 2022-06-11 NOTE — PROGRESS NOTES
Patient interviewed and examined  Resting comfortably in bed  Reports neck is less swollen this morning compared to yesterday  He is tolerating oral intake well without problem  No hoarseness, dysarthria, dysphonia, or dysphagia  No shortness of breath/difficulty breathing  No signs or symptoms of expanding operative site hematoma  He is able to lie flat on his back and breath comfortably in that position     Awake, alert, fully oriented  The patient appears comfortable, in no acute distress  Vital signs remain stable: T 98.2, HR 84, RR 18, /86 SpO2 93% RA  Anterior neck incision clean, dry and intact with steri strips securely in place  Ni-incisional ecchymosis apparent without change  The previously noted operative site hematoma is now smaller and the neck remains soft to palpation with minimal tenderness when palpated  No signs or symptoms of airway compromise     Serum Ca/Mg/Phos are within normal range - 9.0/1.6/4.3    Clinically stable POD1 s/p neck exploration, laryngeal nerve monitoring, total thyroidectomy.   Details of the surgical procedure and operative findings were explained to the patient, which he voiced understanding of.     Advance diet as tolerated    Continue to monitor operative site    Continue to monitor serum Ca/Mg/Phos  Calcium-Vitamin D replacement    Continue aspiration precautions      Katarzyna Martinez MD [facility] CLARKE Ludwig FACS

## 2022-06-11 NOTE — ACP (ADVANCE CARE PLANNING)
Advance Care Planning   Advance Care Planning Inpatient Note  [address]    Today's Date: 6/11/2022  Unit: [facility] 5E1 SURGICAL UNIT    Received request from [facility]. Upon review of chart and communication with care team, patient's decision making abilities are not in question. Patient and Spouse was/were present in the room during visit. Goals of ACP Conversation:  Discuss Advance Care planning documents    Health Care Decision Makers:    No healthcare decision makers have been documented. Click here to complete [address] including selection of the Healthcare Decision Maker Relationship (ie \"Primary\")    Summary:  No Decision Maker named by patient at this time    Advance Care Planning Documents (Patient Wishes) on file:  None     Assessment:    Provided support to this pt in [facility] 502. Reviewed pt's chart prior to this visit. Pt's wife was present during this visit. Offered assistance to pt as a result of AMD consult. Pt stated he already had the document but wasn't interested in completing AMD at this time. Pt didn't have any questions concerning AMD at this time. [address] engaged pt in life review and offered listening presence. [address] assured pt of prayer and concluded by affirming ongoing availability of support.      Interventions:  Deferred conversation as patient not interested in completing an advance directive at this time    Care Preferences Communicated:  No    Outcomes/Plan:  ACP Discussion Refused    Alan Cardenas on 6/11/2022 at 12:44 PM

## 2022-07-20 ENCOUNTER — TRANSCRIBE ORDER (OUTPATIENT)
Dept: SCHEDULING | Age: 57
End: 2022-07-20

## 2022-07-20 DIAGNOSIS — C73 THYROID CANCER (HCC): Primary | ICD-10-CM

## 2022-07-29 ENCOUNTER — HOSPITAL ENCOUNTER (OUTPATIENT)
Dept: NUCLEAR MEDICINE | Age: 57
Discharge: HOME | End: 2022-07-29
Attending: INTERNAL MEDICINE
Payer: COMMERCIAL

## 2022-07-29 DIAGNOSIS — C73 THYROID CANCER (HCC): ICD-10-CM

## 2022-07-29 PROCEDURE — 79005 NUCLEAR RX ORAL ADMIN: CPT

## 2022-07-29 RX ORDER — SODIUM IODIDE I 131 100 MCI/1
101.6 CAPSULE ORAL ONCE
Status: COMPLETED | OUTPATIENT
Start: 2022-07-29 | End: 2022-07-29

## 2022-07-29 RX ADMIN — SODIUM IODIDE I 131 101.6 MILLICURIE: 100 CAPSULE ORAL at 11:00

## 2022-08-05 ENCOUNTER — HOSPITAL ENCOUNTER (OUTPATIENT)
Dept: NUCLEAR MEDICINE | Age: 57
Discharge: HOME | End: 2022-08-05
Attending: INTERNAL MEDICINE
Payer: COMMERCIAL

## 2022-08-05 DIAGNOSIS — C73 THYROID CANCER (HCC): ICD-10-CM

## 2022-08-05 PROCEDURE — 78018 THYROID MET IMAGING BODY: CPT

## 2022-08-11 ENCOUNTER — TRANSCRIBE ORDER (OUTPATIENT)
Dept: SCHEDULING | Age: 57
End: 2022-08-11

## 2022-08-11 DIAGNOSIS — C73 THYROID CANCER (HCC): Primary | ICD-10-CM

## 2022-08-22 ENCOUNTER — HOSPITAL ENCOUNTER (OUTPATIENT)
Dept: CT IMAGING | Age: 57
Discharge: HOME | End: 2022-08-22
Attending: INTERNAL MEDICINE

## 2022-08-22 DIAGNOSIS — C73 THYROID CANCER (HCC): ICD-10-CM

## 2022-08-22 PROCEDURE — 74170 CT ABD WO CNTRST FLWD CNTRST: CPT

## 2022-08-22 PROCEDURE — 74011000636 HC RX REV CODE- 636: Performed by: RADIOLOGY

## 2022-08-22 RX ADMIN — IOPAMIDOL 100 ML: 755 INJECTION, SOLUTION INTRAVENOUS at 09:50

## 2022-08-22 NOTE — ROUTINE PROCESS
08/22/220  0830  Pt with Iodine Allergy documented in chart. Pt strenuously denies allergies, states it is allergy to MRI. Reviewed chart, no IV contrast given at [facility], spoke with Dr. Hilary Aparicio and patient again. Patient continues to deny allergy to Iodine. Dr Hilary Aparicio ok'd scan with contrast.    1015  Contrast given, patient observed 20 min following scan, no reaction to Contrast at this time. Pt discharged with instructions to contact MD regarding allergies in chart.   Ariana Damon, RT

## 2022-08-24 ENCOUNTER — TRANSCRIBE ORDER (OUTPATIENT)
Dept: SCHEDULING | Age: 57
End: 2022-08-24

## 2022-08-24 DIAGNOSIS — C22.8 MALIGNANT NEOPLASM OF LIVER, PRIMARY (HCC): Primary | ICD-10-CM

## 2022-09-15 ENCOUNTER — HOSPITAL ENCOUNTER (OUTPATIENT)
Dept: MRI IMAGING | Age: 57
Discharge: HOME | End: 2022-09-15
Attending: INTERNAL MEDICINE

## 2022-09-15 DIAGNOSIS — C22.8 MALIGNANT NEOPLASM OF LIVER, PRIMARY (HCC): ICD-10-CM

## 2022-09-16 ENCOUNTER — HOSPITAL ENCOUNTER (OUTPATIENT)
Dept: MRI IMAGING | Age: 57
Discharge: HOME | End: 2022-09-16
Attending: INTERNAL MEDICINE

## 2022-09-16 ENCOUNTER — HOSPITAL ENCOUNTER (EMERGENCY)
Age: 57
Discharge: STILL ADMITTED OR ADMINISTRATIVE | End: 2022-09-16

## 2022-09-16 PROCEDURE — 74011000258 HC RX REV CODE- 258: Performed by: RADIOLOGY

## 2022-09-16 PROCEDURE — 74011000250 HC RX REV CODE- 250: Performed by: RADIOLOGY

## 2022-09-16 PROCEDURE — 74011250636 HC RX REV CODE- 250/636

## 2022-09-16 PROCEDURE — 77030021566 MRI ABD W WO CONT

## 2022-09-16 PROCEDURE — A9576 INJ PROHANCE MULTIPACK: HCPCS

## 2022-09-16 RX ORDER — SODIUM CHLORIDE 0.9 % (FLUSH) 0.9 %
10 SYRINGE (ML) INJECTION
Status: COMPLETED | OUTPATIENT
Start: 2022-09-16 | End: 2022-09-16

## 2022-09-16 RX ADMIN — SODIUM CHLORIDE 100 ML: 900 INJECTION, SOLUTION INTRAVENOUS at 20:00

## 2022-09-16 RX ADMIN — SODIUM CHLORIDE, PRESERVATIVE FREE 10 ML: 5 INJECTION INTRAVENOUS at 20:00

## 2022-09-16 RX ADMIN — GADOTERIDOL 19 ML: 279.3 INJECTION, SOLUTION INTRAVENOUS at 19:40

## 2022-09-29 ENCOUNTER — HOSPITAL ENCOUNTER (EMERGENCY)
Age: 57
Discharge: ANOTHER HOSPITAL | End: 2022-09-30
Attending: EMERGENCY MEDICINE
Payer: COMMERCIAL

## 2022-09-29 DIAGNOSIS — F23 ACUTE PSYCHOSIS (HCC): Primary | ICD-10-CM

## 2022-09-29 DIAGNOSIS — Z72.0 TOBACCO ABUSE: ICD-10-CM

## 2022-09-29 LAB — SARS-COV-2, COV2: NORMAL

## 2022-09-29 PROCEDURE — 87636 SARSCOV2 & INF A&B AMP PRB: CPT

## 2022-09-29 PROCEDURE — 80307 DRUG TEST PRSMV CHEM ANLYZR: CPT

## 2022-09-29 PROCEDURE — 82077 ASSAY SPEC XCP UR&BREATH IA: CPT

## 2022-09-29 PROCEDURE — 87635 SARS-COV-2 COVID-19 AMP PRB: CPT

## 2022-09-29 PROCEDURE — 80053 COMPREHEN METABOLIC PANEL: CPT

## 2022-09-30 ENCOUNTER — HOSPITAL ENCOUNTER (INPATIENT)
Age: 57
LOS: 6 days | Discharge: HOME | DRG: 881 | End: 2022-10-06
Attending: PSYCHIATRY & NEUROLOGY | Admitting: PSYCHIATRY & NEUROLOGY
Payer: COMMERCIAL

## 2022-09-30 VITALS
OXYGEN SATURATION: 94 % | BODY MASS INDEX: 29.01 KG/M2 | RESPIRATION RATE: 18 BRPM | WEIGHT: 207.23 LBS | HEART RATE: 84 BPM | HEIGHT: 71 IN | SYSTOLIC BLOOD PRESSURE: 136 MMHG | TEMPERATURE: 98.4 F | DIASTOLIC BLOOD PRESSURE: 78 MMHG

## 2022-09-30 DIAGNOSIS — E27.1 ADDISON'S DISEASE (HCC): ICD-10-CM

## 2022-09-30 DIAGNOSIS — F22 DELUSIONAL DISORDER (HCC): Primary | ICD-10-CM

## 2022-09-30 PROBLEM — F29 UNSPECIFIED PSYCHOSIS NOT DUE TO A SUBSTANCE OR KNOWN PHYSIOLOGICAL CONDITION (HCC): Status: ACTIVE | Noted: 2022-09-30

## 2022-09-30 LAB
ALBUMIN SERPL-MCNC: 4 G/DL (ref 3.5–5)
ALBUMIN/GLOB SERPL: 1.1 {RATIO} (ref 1.1–2.2)
ALP SERPL-CCNC: 80 U/L (ref 45–117)
ALT SERPL-CCNC: 39 U/L (ref 12–78)
AMPHET UR QL SCN: POSITIVE
ANION GAP SERPL CALC-SCNC: 9 MMOL/L (ref 5–15)
AST SERPL-CCNC: 30 U/L (ref 15–37)
BARBITURATES UR QL SCN: NEGATIVE
BASOPHILS # BLD: 0.1 K/UL (ref 0–0.1)
BASOPHILS NFR BLD: 1 % (ref 0–1)
BENZODIAZ UR QL: NEGATIVE
BILIRUB SERPL-MCNC: 1.1 MG/DL (ref 0.2–1)
BUN SERPL-MCNC: 11 MG/DL (ref 6–20)
BUN/CREAT SERPL: 10 (ref 12–20)
CALCIUM SERPL-MCNC: 9.1 MG/DL (ref 8.5–10.1)
CANNABINOIDS UR QL SCN: NEGATIVE
CHLORIDE SERPL-SCNC: 104 MMOL/L (ref 97–108)
CO2 SERPL-SCNC: 24 MMOL/L (ref 21–32)
COCAINE UR QL SCN: NEGATIVE
COVID-19 RAPID TEST, COVR: NOT DETECTED
CREAT SERPL-MCNC: 1.14 MG/DL (ref 0.7–1.3)
DIFFERENTIAL METHOD BLD: ABNORMAL
DRUG SCRN COMMENT,DRGCM: ABNORMAL
EOSINOPHIL # BLD: 0.2 K/UL (ref 0–0.4)
EOSINOPHIL NFR BLD: 2 % (ref 0–7)
ERYTHROCYTE [DISTWIDTH] IN BLOOD BY AUTOMATED COUNT: 12.6 % (ref 11.5–14.5)
ETHANOL SERPL-MCNC: <10 MG/DL
FLUAV RNA SPEC QL NAA+PROBE: NOT DETECTED
FLUBV RNA SPEC QL NAA+PROBE: NOT DETECTED
GLOBULIN SER CALC-MCNC: 3.7 G/DL (ref 2–4)
GLUCOSE SERPL-MCNC: 137 MG/DL (ref 65–100)
HCT VFR BLD AUTO: 45.7 % (ref 36.6–50.3)
HGB BLD-MCNC: 15.6 G/DL (ref 12.1–17)
IMM GRANULOCYTES # BLD AUTO: 0 K/UL (ref 0–0.04)
IMM GRANULOCYTES NFR BLD AUTO: 0 % (ref 0–0.5)
LYMPHOCYTES # BLD: 2.5 K/UL (ref 0.8–3.5)
LYMPHOCYTES NFR BLD: 24 % (ref 12–49)
MCH RBC QN AUTO: 32.4 PG (ref 26–34)
MCHC RBC AUTO-ENTMCNC: 34.1 G/DL (ref 30–36.5)
MCV RBC AUTO: 94.8 FL (ref 80–99)
METHADONE UR QL: NEGATIVE
MONOCYTES # BLD: 0.9 K/UL (ref 0–1)
MONOCYTES NFR BLD: 9 % (ref 5–13)
NEUTS SEG # BLD: 6.9 K/UL (ref 1.8–8)
NEUTS SEG NFR BLD: 64 % (ref 32–75)
NRBC # BLD: 0 K/UL (ref 0–0.01)
NRBC BLD-RTO: 0 PER 100 WBC
OPIATES UR QL: NEGATIVE
PCP UR QL: NEGATIVE
PLATELET # BLD AUTO: 315 K/UL (ref 150–400)
PMV BLD AUTO: 8.5 FL (ref 8.9–12.9)
POTASSIUM SERPL-SCNC: 3.5 MMOL/L (ref 3.5–5.1)
PROT SERPL-MCNC: 7.7 G/DL (ref 6.4–8.2)
RBC # BLD AUTO: 4.82 M/UL (ref 4.1–5.7)
SARS-COV-2, COV2: NOT DETECTED
SODIUM SERPL-SCNC: 137 MMOL/L (ref 136–145)
SOURCE, COVRS: NORMAL
UR CULT HOLD, URHOLD: NORMAL
WBC # BLD AUTO: 10.5 K/UL (ref 4.1–11.1)

## 2022-09-30 PROCEDURE — 74011250637 HC RX REV CODE- 250/637: Performed by: PSYCHIATRY & NEUROLOGY

## 2022-09-30 PROCEDURE — 36415 COLL VENOUS BLD VENIPUNCTURE: CPT

## 2022-09-30 PROCEDURE — 90791 PSYCH DIAGNOSTIC EVALUATION: CPT

## 2022-09-30 PROCEDURE — 65220000003 HC RM SEMIPRIVATE PSYCH

## 2022-09-30 PROCEDURE — 99283 EMERGENCY DEPT VISIT LOW MDM: CPT

## 2022-09-30 PROCEDURE — 85025 COMPLETE CBC W/AUTO DIFF WBC: CPT

## 2022-09-30 PROCEDURE — 74011250637 HC RX REV CODE- 250/637: Performed by: EMERGENCY MEDICINE

## 2022-09-30 RX ORDER — TRAZODONE HYDROCHLORIDE 50 MG/1
50 TABLET ORAL
Status: DISCONTINUED | OUTPATIENT
Start: 2022-09-30 | End: 2022-10-06 | Stop reason: HOSPADM

## 2022-09-30 RX ORDER — IBUPROFEN 200 MG
1 TABLET ORAL DAILY
Status: DISCONTINUED | OUTPATIENT
Start: 2022-09-30 | End: 2022-09-30 | Stop reason: HOSPADM

## 2022-09-30 RX ORDER — BENZTROPINE MESYLATE 1 MG/1
1 TABLET ORAL
Status: DISCONTINUED | OUTPATIENT
Start: 2022-09-30 | End: 2022-10-06 | Stop reason: HOSPADM

## 2022-09-30 RX ORDER — HYDROCORTISONE 10 MG/1
20 TABLET ORAL
Status: DISCONTINUED | OUTPATIENT
Start: 2022-10-01 | End: 2022-10-06 | Stop reason: HOSPADM

## 2022-09-30 RX ORDER — IBUPROFEN 200 MG
1 TABLET ORAL DAILY
Status: DISCONTINUED | OUTPATIENT
Start: 2022-10-01 | End: 2022-10-01

## 2022-09-30 RX ORDER — NAPROXEN SODIUM 220 MG
220 TABLET ORAL
COMMUNITY
End: 2022-10-06

## 2022-09-30 RX ORDER — MIDAZOLAM HYDROCHLORIDE 1 MG/ML
2 INJECTION, SOLUTION INTRAMUSCULAR; INTRAVENOUS
Status: DISCONTINUED | OUTPATIENT
Start: 2022-09-30 | End: 2022-10-06 | Stop reason: HOSPADM

## 2022-09-30 RX ORDER — ATORVASTATIN CALCIUM 40 MG/1
40 TABLET, FILM COATED ORAL
Status: COMPLETED | OUTPATIENT
Start: 2022-09-30 | End: 2022-09-30

## 2022-09-30 RX ORDER — IBUPROFEN 200 MG
600 TABLET ORAL
COMMUNITY

## 2022-09-30 RX ORDER — IBUPROFEN 400 MG/1
400 TABLET ORAL
Status: DISCONTINUED | OUTPATIENT
Start: 2022-09-30 | End: 2022-10-06 | Stop reason: HOSPADM

## 2022-09-30 RX ORDER — DM/P-EPHED/ACETAMINOPH/DOXYLAM 30-7.5/3
2 LIQUID (ML) ORAL
Status: DISCONTINUED | OUTPATIENT
Start: 2022-09-30 | End: 2022-10-06 | Stop reason: HOSPADM

## 2022-09-30 RX ORDER — LORAZEPAM 2 MG/ML
1 INJECTION INTRAMUSCULAR
Status: DISCONTINUED | OUTPATIENT
Start: 2022-09-30 | End: 2022-09-30 | Stop reason: ALTCHOICE

## 2022-09-30 RX ORDER — DIPHENHYDRAMINE HYDROCHLORIDE 50 MG/ML
50 INJECTION, SOLUTION INTRAMUSCULAR; INTRAVENOUS
Status: DISCONTINUED | OUTPATIENT
Start: 2022-09-30 | End: 2022-10-06 | Stop reason: HOSPADM

## 2022-09-30 RX ORDER — MELATONIN
1000 DAILY
COMMUNITY

## 2022-09-30 RX ORDER — PHENOL/SODIUM PHENOLATE
20 AEROSOL, SPRAY (ML) MUCOUS MEMBRANE
Status: ON HOLD | COMMUNITY
End: 2022-10-06 | Stop reason: SDUPTHER

## 2022-09-30 RX ORDER — PANTOPRAZOLE SODIUM 40 MG/1
40 TABLET, DELAYED RELEASE ORAL
Status: COMPLETED | OUTPATIENT
Start: 2022-09-30 | End: 2022-09-30

## 2022-09-30 RX ORDER — HYDROXYZINE 25 MG/1
50 TABLET, FILM COATED ORAL
Status: DISCONTINUED | OUTPATIENT
Start: 2022-09-30 | End: 2022-10-06 | Stop reason: HOSPADM

## 2022-09-30 RX ORDER — ADHESIVE BANDAGE
30 BANDAGE TOPICAL DAILY PRN
Status: DISCONTINUED | OUTPATIENT
Start: 2022-09-30 | End: 2022-10-06 | Stop reason: HOSPADM

## 2022-09-30 RX ORDER — ACETAMINOPHEN 325 MG/1
650 TABLET ORAL
Status: DISCONTINUED | OUTPATIENT
Start: 2022-09-30 | End: 2022-10-06 | Stop reason: HOSPADM

## 2022-09-30 RX ORDER — OLANZAPINE 5 MG/1
5 TABLET ORAL
Status: DISCONTINUED | OUTPATIENT
Start: 2022-09-30 | End: 2022-10-06 | Stop reason: HOSPADM

## 2022-09-30 RX ORDER — LORAZEPAM 1 MG/1
2 TABLET ORAL
Status: COMPLETED | OUTPATIENT
Start: 2022-09-30 | End: 2022-09-30

## 2022-09-30 RX ORDER — HALOPERIDOL 5 MG/ML
5 INJECTION INTRAMUSCULAR
Status: DISCONTINUED | OUTPATIENT
Start: 2022-09-30 | End: 2022-10-06 | Stop reason: HOSPADM

## 2022-09-30 RX ADMIN — ATORVASTATIN CALCIUM 40 MG: 40 TABLET, FILM COATED ORAL at 01:06

## 2022-09-30 RX ADMIN — PANTOPRAZOLE SODIUM 40 MG: 40 TABLET, DELAYED RELEASE ORAL at 01:06

## 2022-09-30 RX ADMIN — LORAZEPAM 2 MG: 1 TABLET ORAL at 01:06

## 2022-09-30 RX ADMIN — Medication 2 MG: at 21:26

## 2022-09-30 NOTE — ED NOTES
This rn at bedside to speak with pt and assess. Pt states hs just found out 2 days ago that his wife of 28 years is cheating on him, pt states ge found a red and white chalk based object in a vehicle in which he wasn't sure whos it was, he asked both of his daughters if they knew who's it was and they denied knowing, and when he asked his wife he wife appeared startled. He states \"the object had invisible ink in which he shined a light on it and it read \"I love the way you fuck me and the way you make me cum. \" Pt states his wife just went on a training trip for work and he states he believes she is having affairs with her boss. Pt also states \" there is a writing in the carpet as well that states messages in invisble ink that she is writing to him. \" Pt also states \"she wrote the initals of the man she is sleeping with clear as day on all my guns including my antique guns that have high value. \" Pt also states \"there are numbers written on my face bilateral sides that says 83. \" Pt states  the number 83 was written on the pillow and then transferred to his face. Police were called to the house due to patient irrational behavior, pt is voluntary at this time and denies and si/hi. Only medical hx is addisions and thyroid cancer and he is not currently on meds.

## 2022-09-30 NOTE — ED NOTES
As this rn was at bedside to admin meds per mar orders pt states he thinks there is writing and faces on his pants, pt states he just got his pants out the drawer before coming here.

## 2022-09-30 NOTE — BSMART NOTE
Comprehensive Assessment Form Part 1      Section I - Disposition    Unspecified Psychosis  Acute Stress Disorder      The Medical Doctor to Psychiatrist conference was not completed. The Medical Doctor is in agreement with Psychiatrist disposition because of (reason) ED provider in agreement. The plan is admit to behavioral health [facility]   The on-call Psychiatrist consulted was [facility]. The admitting Psychiatrist will be Dr. Eleazar Martinez. The admitting Diagnosis is Unspecified Psychosis. The Payor source is . The name of the representative was . This was . This writer reviewed the [address] in nursing flowsheet and the risk level assigned is no risk. Based on this assessment, the risk of suicide is no risk and the plan is admit to behavioral health. Section II - Integrated Summary  Summary:  Triage:Pt was brought to ER by his daughter d/t increasing paranoia and visual hallucinations. Pt believes his wife is cheating on him and is writing messages around their house in invisible ink. Pt denied auditory hallucination and SI/HI. Pt does have access to guns in his home. Pt is calm and cooperative in triage. At bedside, patient denied suicidal, homicidal thoughts and hallucinations. Patient reported he was at home as reported \"irate\" due to him finding out his wife is cheating on him. As reported he found out this information as reported by disc that has invisible ink on it. As reported the disk had writings on it from his wife and as reported her boss that she is cheating with. Patient also reported messages being on the wall, floor, carpet and his car. Patient stated that the messages were between them which detailed sexual things, her saying she hated him and other things about him. Patient reported only being able to see these messages at night with use of red light, 3D glasses and he reported having to heat up disc to see it.   As reported all the messages were repetitive. Patient reported getting shirt out his drawer and it had the man's initials on it as reported all his shirts had it on them. Patient also reported he went to closet and the shirts he looked at had his initials so he just did not look at the others. Patient reported he laid all his guns out because his initials were on the guns. Patient reported that this started when she went out of town earlier this week for work. Patient then began telling a story about earlier this year he felt bugs in his man cave and reported friend did but none of his family did, patient reported his wife is using that and recent thing going on to make people think he is crazy. Patient stated that she texted him the other day and stated when I come home I am going to have you committed. Patient reported she wants to give his truck away to her boyfriend. Patient reported over past few days he has not been sleeping well, not eating much and has missed his medication yesterday and today. No previous psychiatric admissions. Patient able to complete ADL's. Patient does not have any mental health providers. Patient was cooperative, pressured speech. Patient's daughter Doreen Prasad at bedside and he gave permission to speak with this writer. She reported being concerned after seeing her father's guns on bed and as reported he was on the ground with light and magnifying glass reported that he was seeing all these letters and words everywhere. As reported on the carpet and walls. The patienthas demonstrated mental capacity to provide informed consent. The information is given by the patient and relative(s). The Chief Complaint is delusions, paranoid. The Precipitant Factors are unknown. Previous Hospitalizations: no  The patient has not previously been in restraints. Current Psychiatrist and/or  is none. Lethality Assessment:    The potential for suicide noted by the following: not noted, active psychosis .   The potential for homicide is not noted. The patient has not been a perpetrator of sexual or physical abuse. There are not pending charges. The patient is felt to be at risk for self harm or harm to others. The attending nurse was advised patient denies any plans or past attempts, actively delusional, has guns in the home. Section III - Psychosocial  The patient's overall mood and attitude is upset about situation, cooperative with this writer. Feelings of helplessness and hopelessness are not observed. Generalized anxiety is not observed. Panic is not observed. Phobias are not observed. Obsessive compulsive tendencies are not observed. Section IV - Mental Status Exam  The patient's appearance shows no evidence of impairment. The patient's behavior shows no evidence of impairment. The patient is oriented to time, place, person and situation. The patient's speech is pressured. The patient's mood is euthymic. The range of affect shows no evidence of impairment. The patient's thought content demonstrates delusions and paranoia. The thought process shows no evidence of impairment. The patient's perception demonstrated changes in the following:  visual. The patient's memory shows no evidence of impairment. The patient's appetite shows no evidence of impairment. The patient's sleep has evidence of insomnia. The patient shows little insight. The patient's judgement is psychologically impaired. Section V - Substance Abuse  The patient is using substances. The patient is using tobacco by inhalation for greater than 10 years with last use on yesterday, alcohol for greater than 10 years with last use on social use, and cannabis by inhalation for greater than 10 years with last use on occasional use. The patient has experienced the following withdrawal symptoms: N/A. Section VI - Living Arrangements  The patient is .   The spouses approximate age is unknown and appears to be in unknown health. The patient lives with a spouse. The patient has children. The patient does plan to return home upon discharge. The patient does not have legal issues pending. The patient's source of income comes from employment. Anabaptism and cultural practices have not been voiced at this time. The patient's greatest support comes from family and this person will be involved with the treatment. The patient has not been in an event described as horrible or outside the realm of ordinary life experience either currently or in the past.  The patient has not been a victim of sexual/physical abuse. Section VII - Other Areas of Clinical Concern  The highest grade achieved is not assessed with the overall quality of school experience being described as not assessed. The patient is currently employed and speaks [place] as a primary language. The patient has no communication impairments affecting communication. The patient's preference for learning can be described as: can read and write adequately.   The patient's hearing is normal.  The patient's vision is normal.      Mark Jovel

## 2022-09-30 NOTE — ED PROVIDER NOTES
EMERGENCY DEPARTMENT HISTORY AND PHYSICAL EXAM       Please note that this dictation was completed with Max Rumpus, the computer voice recognition software. Quite often unanticipated grammatical, syntax, homophones, and other interpretive errors are inadvertently transcribed by the computer software. Please disregard these errors. Please excuse any errors that have escaped final proofreading. Date: 9/29/2022  Patient Name: Bassem Rivero. Patient Age and Sex: 65 y.o. male    History of Presenting Illness     Chief Complaint   Patient presents with    Mental Health Problem     Pt was brought to ER by his daughter d/t increasing paranoia and visual hallucinations. Pt believes his wife is cheating on him and is writing messages around their house in invisible ink. Pt denied auditory hallucination and SI/HI. Pt does have access to guns in his home. Pt is calm and cooperative in triage. History Provided By: Patient     Chief Complaint: mental health issue      HPI: Bassem Rivero., is a 65 y.o. male who presents to the ED voluntarily for a psychiatric evaluation. Patient states that he has been very anxious over the past week or so and all of his symptoms started after he discovered messages in his car rewritten in invisible ink and suggested that they were written by an individual whom his wife is having an affair with. After this he has seen invisible ink messages in multiple places around his house. He is also seeing numbers that are imprinted on his face and that are \"growing out of\" his face. His wife called the police today due to being increasingly worried about his mental health. When the police attempted to petition for an E CO and this was denied because the patient decided to voluntarily come and get evaluated. No alcohol or drug use. Patient is a history of opiate abuse and was on methadone for some time but has been tapered off years ago and has not used since.   Not currently under any psychiatric treatment. No known psych history. In addition to the patient's mental health, his wife is also concerned about their safety because the patient has multiple antique guns in the house. The patient denies SI/HI. The patient denies having any medical complaints or recent illnesses. 4    Pt denies any other alleviating or exacerbating factors. No other associated signs or symptoms. There are no other complaints, changes or physical findings at this time.      PCP: None    Past History   All documented elements of the [facility] reviewed and verified by me. -Chandana Dickerson MD    Past Medical History:  Past Medical History:   Diagnosis Date    [facility] ([facility] [facility]     Arthritis     Autoimmune disease ([facility] [facility]     YESSICA'S DISEASE    Chest pain 8/22/2017    Chronic back pain 8/13/2017    Chronic insomnia 8/10/2017    Difficulty in urination 8/10/2017    Endocrine disease     yessica's disease    GERD (gastroesophageal reflux disease)     Hypercholesteremia     Hypogonadism male 8/10/2017    Hypothyroidism 8/10/2017    ADDISONS DISEASE    Kidney stones     multiple, has had lithotripsey as well as passed on his own    Low testosterone 8/10/2018    Opioid dependence ([facility] [address] 8/10/2017    Other ill-defined conditions(799.89)     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, on methadone    Psychiatric disorder     ANXIETY    Severe anxiety with panic 12/5/2018    Thumb pain 8/10/2017    Vitamin D deficiency 8/10/2017       Past Surgical History:  Past Surgical History:   Procedure Laterality Date    HX ORTHOPAEDIC      LEFT ROTATER CUFF REPAIR     HX ORTHOPAEDIC  08/2018    LUMBAR FUSION    HX ORTHOPAEDIC Left     CYST REMOVED FROM HAND    HX OTHER SURGICAL      pylonidal cyst    HX OTHER SURGICAL      penial fracture       Family History:   Family History   Problem Relation Age of Onset    Cancer Mother         breast    High Cholesterol Mother     Stroke Father     Diabetes Father     High Cholesterol Father     Cancer Sister         breast    High Cholesterol Sister     Cancer Sister         breast    High Cholesterol Sister     Cancer Brother         SKIN CANCER    Other Brother         GOUT    High Cholesterol Brother     Heart Attack Paternal Grandmother     Anesth Problems Neg Hx        Social History:  Social History     Tobacco Use    Smoking status: Every Day     Packs/day: 1.00     Years: 40.00     Pack years: 40.00     Types: Cigarettes    Smokeless tobacco: Never    Tobacco comments:     LOOKING INTO CHANTIX   Vaping Use    Vaping Use: Never used   Substance Use Topics    Alcohol use: No     Comment: RARELY    Drug use: No       Current Medications:  No current facility-administered medications on file prior to encounter. Current Outpatient Medications on File Prior to Encounter   Medication Sig Dispense Refill    levothyroxine (SYNTHROID) 175 mcg tablet Take 1 Tablet by mouth Daily (before breakfast). 180 Tablet 3    b-complex with vitamin c tablet Take 1 Tab by mouth daily.  testosterone cypionate (DEPOTESTOTERONE CYPIONATE) 200 mg/mL injection 75 mg by IntraMUSCular route Once every 2 weeks. LAST DOSE 8/6/18.  calcium carbonate (OS-JAMMIE) 500 mg calcium (1,250 mg) tablet Take 1 Tablet by mouth every six (6) hours. (Patient not taking: Reported on 9/30/2022) 180 Tablet 3    calcitRIOL (ROCALTROL) 0.25 mcg capsule Take 1 Capsule by mouth daily. (Patient not taking: Reported on 9/30/2022) 30 Capsule 3    Omeprazole delayed release (PRILOSEC D/R) 20 mg tablet Take 20 mg by mouth nightly.  MULTIVITAMIN PO Take 1 Tab by mouth daily.  simvastatin (ZOCOR) 10 mg tablet Take 40 mg by mouth nightly. Indications: HYPERCHOLESTEROLEMIA      hydrocortisone (CORTEF) 10 mg tablet Take 20 mg by mouth every morning. Allergies:   Allergies   Allergen Reactions    Iodinated Contrast Media Hives     08/22/22   Patient denies allergy to Iodine Contrast.  Pt given contrast without premeds,  observed for 20 min following, No Reaction Noted. Pt discharged with instructions to follow up  with MD. Candice Hernandez Other Medication Hives     CONTRAST WITH MRI       Review of Systems   All other systems reviewed and negative    Review of Systems   Constitutional:  Negative for fever. HENT:  Negative for congestion. Eyes:  Negative for visual disturbance. Respiratory:  Negative for shortness of breath. Cardiovascular:  Negative for chest pain and palpitations. Gastrointestinal:  Negative for abdominal pain and vomiting. Endocrine: Negative. Genitourinary:  Negative for dysuria and hematuria. Musculoskeletal:  Negative for joint swelling. Skin: Negative. Negative for rash. Neurological:  Negative for weakness and numbness. Psychiatric/Behavioral:  Negative for suicidal ideas. The patient is nervous/anxious. All other systems reviewed and are negative. Physical Exam   Reviewed patients vital signs and nursing note    Physical Exam  Vitals and nursing note reviewed. Constitutional:       Appearance: He is not diaphoretic. HENT:      Head: Atraumatic. Nose: Nose normal.      Mouth/Throat:      Mouth: Mucous membranes are moist.   Eyes:      Extraocular Movements: Extraocular movements intact. Conjunctiva/sclera: Conjunctivae normal.      Pupils: Pupils are equal, round, and reactive to light. Cardiovascular:      Rate and Rhythm: Normal rate and regular rhythm. Pulses: Normal pulses. Heart sounds: Normal heart sounds. Pulmonary:      Effort: Pulmonary effort is normal.      Breath sounds: Normal breath sounds. Abdominal:      Palpations: Abdomen is soft. Tenderness: There is no abdominal tenderness. Musculoskeletal:         General: No tenderness. Normal range of motion. Cervical back: Normal range of motion. No rigidity. Skin:     General: Skin is warm and dry.       Capillary Refill: Capillary refill takes less than 2 seconds. Neurological:      General: No focal deficit present. Mental Status: He is alert and oriented to person, place, and time. Psychiatric:         Attention and Perception: He perceives visual hallucinations. Mood and Affect: Mood is anxious. Affect is flat. Speech: Speech normal.         Behavior: Behavior is cooperative. Thought Content: Thought content is paranoid and delusional. Thought content does not include homicidal or suicidal ideation. Diagnostic Study Results     Labs - I have personally reviewed and interpreted all laboratory results. Interpretation of available and pertinent results detailed below in MDM. David Medina MD, MSc  Recent Results (from the past 24 hour(s))   METABOLIC PANEL, COMPREHENSIVE    Collection Time: 09/29/22 11:18 PM   Result Value Ref Range    Sodium 137 136 - 145 mmol/L    Potassium 3.5 3.5 - 5.1 mmol/L    Chloride 104 97 - 108 mmol/L    CO2 24 21 - 32 mmol/L    Anion gap 9 5 - 15 mmol/L    Glucose 137 (H) 65 - 100 mg/dL    BUN 11 6 - 20 MG/DL    Creatinine 1.14 0.70 - 1.30 MG/DL    BUN/Creatinine ratio 10 (L) 12 - 20      GFR est AA >60 >60 ml/min/1.73m2    GFR est non-AA >60 >60 ml/min/1.73m2    Calcium 9.1 8.5 - 10.1 MG/DL    Bilirubin, total 1.1 (H) 0.2 - 1.0 MG/DL    ALT (SGPT) 39 12 - 78 U/L    AST (SGOT) 30 15 - 37 U/L    Alk.  phosphatase 80 45 - 117 U/L    Protein, total 7.7 6.4 - 8.2 g/dL    Albumin 4.0 3.5 - 5.0 g/dL    Globulin 3.7 2.0 - 4.0 g/dL    A-G Ratio 1.1 1.1 - 2.2     ETHYL ALCOHOL    Collection Time: 09/29/22 11:18 PM   Result Value Ref Range    ALCOHOL(ETHYL),SERUM <10 <10 MG/DL   DRUG SCREEN, URINE    Collection Time: 09/29/22 11:18 PM   Result Value Ref Range    AMPHETAMINES Positive (A) NEG      BARBITURATES Negative NEG      BENZODIAZEPINES Negative NEG      COCAINE Negative NEG      METHADONE Negative NEG      OPIATES Negative NEG      PCP(PHENCYCLIDINE) Negative NEG      THC (TH-CANNABINOL) Negative NEG Drug screen comment (NOTE)    SARS-COV-2    Collection Time: 09/29/22 11:18 PM   Result Value Ref Range    SARS-CoV-2 by PCR Please find results under separate order     COVID-19 RAPID TEST    Collection Time: 09/29/22 11:18 PM   Result Value Ref Range    Specimen source Nasopharyngeal      COVID-19 rapid test Not detected NOTD     COVID-19 WITH INFLUENZA A/B    Collection Time: 09/29/22 11:18 PM   Result Value Ref Range    SARS-CoV-2 by PCR Not detected NOTD      Influenza A by PCR Not detected NOTD      Influenza B by PCR Not detected NOTD     URINE CULTURE HOLD SAMPLE    Collection Time: 09/29/22 11:18 PM    Specimen: Serum   Result Value Ref Range    Urine culture hold        Urine on hold in Microbiology dept for 2 days. If unpreserved urine is submitted, it cannot be used for addtional testing after 24 hours, recollection will be required. CBC WITH AUTOMATED DIFF    Collection Time: 09/30/22  2:15 AM   Result Value Ref Range    WBC 10.5 4.1 - 11.1 K/uL    RBC 4.82 4.10 - 5.70 M/uL    HGB 15.6 12.1 - 17.0 g/dL    HCT 45.7 36.6 - 50.3 %    MCV 94.8 80.0 - 99.0 FL    MCH 32.4 26.0 - 34.0 PG    MCHC 34.1 30.0 - 36.5 g/dL    RDW 12.6 11.5 - 14.5 %    PLATELET 886 113 - 013 K/uL    MPV 8.5 (L) 8.9 - 12.9 FL    NRBC 0.0 0  WBC    ABSOLUTE NRBC 0.00 0.00 - 0.01 K/uL    NEUTROPHILS 64 32 - 75 %    LYMPHOCYTES 24 12 - 49 %    MONOCYTES 9 5 - 13 %    EOSINOPHILS 2 0 - 7 %    BASOPHILS 1 0 - 1 %    IMMATURE GRANULOCYTES 0 0.0 - 0.5 %    ABS. NEUTROPHILS 6.9 1.8 - 8.0 K/UL    ABS. LYMPHOCYTES 2.5 0.8 - 3.5 K/UL    ABS. MONOCYTES 0.9 0.0 - 1.0 K/UL    ABS. EOSINOPHILS 0.2 0.0 - 0.4 K/UL    ABS. BASOPHILS 0.1 0.0 - 0.1 K/UL    ABS. IMM. GRANS. 0.0 0.00 - 0.04 K/UL    DF AUTOMATED         Radiologic Studies - I have personally reviewed and interpreted (see [facility] for brief interpreation of available results) all imaging studies and agree with radiology interpretation and report.  Colin Niño MD, MSc  No orders to display         Medical Decision Making   I am the first provider for this patient. Records Reviewed:   I reviewed our electronic medical record system for any past medical records that were available that may contribute to the patient's current condition, including their PMH, surgical history, social and family history. This includes most recent ED visits, any available discharge summaries and prior ECGs, which I have reviewed and interpreted personally. I have summarized most salient findings in my HPI and MDM. Ashok Franz MD, MSc    I also reviewed the nursing notes and vital signs from today's visit. Nursing notes will be reviewed as they become available in realtime while the pt has been in the ED. Ashok Franz MD, MSc      Vital Signs-Reviewed the patient's vital signs. Patient Vitals for the past 24 hrs:   Temp Pulse Resp BP SpO2   09/30/22 0415 -- 86 -- (!) 156/101 97 %   09/30/22 0122 97.8 °F (36.6 °C) 90 18 (!) 149/95 96 %   09/29/22 1816 97.8 °F (36.6 °C) (!) 102 18 (!) 150/111 97 %         Provider Notes and Medical Decision Making: This patient presents with acute psychosis, visual hallucinations, paranoia and delusional thoughts . symptoms consistent with an underlying psychiatric disorder rather than acute organic causes such as encephalopathy, delirium, dementia. No history and low concern for acute ingestions or withdrawal; no evidence of toxidrome. Will consult appropriate behavioral health counselor to determine if patient requires inpatient care or is safe to be set up for outpatient treatment and follow up. Vital signs are within acceptable limits. BP is slightly elevated and likely driven up by is current circumstances and anxiety. At this time would not intervene on this as there is no evidence of end-organ damage. Recommend following the BP over the next few days and assess it once he has settled down after transfer.  His physical exam is normal. Medical screening exam shows no concern for underlying medical issue and patient is medically cleared. ED Course: The patients presenting problems have been discussed, and they are in agreement with the care plan formulated and outlined with them. I have encouraged them to ask questions as they arise throughout their visit. ED Course as of 09/30/22 0516   Fri Sep 30, 2022   9156 I was just informed by access center that the accepting NP (Cinthia) blocked the transfer/admission due to high BP and would like intervention on the BP prior to transfer. Patient's BP does not need emergent intervention. In fact, giving him bp medications right now may cause harm by dropping his bp too low once he is in a more normal enviroment and once anxiety is appropriately treated. I called the NP back but was told by her that she never blocked any transfer. Spoke with transfer center after that who confirmed that the np Cinthia blocked the transfer. Requested to transfer to proceed or to get me in touch with admitting/supervising physician. [TZ]      ED Course User Index  [TZ] Timur Mayes MD               TOBACCO COUNSELING:   Upon evaluation, pt expressed that they are an active tobacco user. For approximately 6 minutes, I counseled pt face-to-face on the dangers of smoking and encouraged quitting as soon as possible in order to decrease further risks to their health. I assessed their readiness to quit smoking and patient states they are actively thinking about it but the habit of smoking as well as euphoric effect of nicotine are barriers. I encouraged them to continue thinking about it and to set a quitting date. I provided specific suggestions on how to quit smoking, including CBT, support groups, acupuncture, medication assistance. Pt has conveyed their understanding of the risks involved should they continue to use tobacco products.       Consult Note:  Yvette Thompson MD spoke with  [facility],   Discussed pt's hx, physical exam and available diagnostic and imaging results. Reviewed care plans. Agree with management and plan thus far. Recommend admission. Patient still voluntary and cooperative. However, if he decides to leave [facility] would like to be contacted immediately as they would then petition for a TDO. DISPOSITION: ADMIT/TRANSFER  Patient is being admitted to the hospital.  Their test results and reasons for admission have been discussed. The patient and/or available family express agreement with and understanding of the need to be admitted and their admission diagnosis. Thank you for resuming the care of this patient. Please don't hesitate to contact me in the emergency department if you  have any additional questions. Mary Daily MD, MSc      Mahad Brooks MD, am the attending of record for this patient encounter. Diagnosis     Final Clinical Impression:   1. Acute psychosis ([address]    2. Tobacco abuse        Attestation:  I personally performed the services described in this documentation on this date 9/29/2022 for patient Martha Pfeiffer. Melissa Hebert MD

## 2022-09-30 NOTE — H&P
[address] [facility] HISTORY AND PHYSICAL    Name:  Haydee Ding  MR#:  [id number]  :  1965  ACCOUNT #:  [de-identified]  ADMIT DATE:  2022    PSYCHIATRIC INTAKE NOTE    CHIEF COMPLAINT:  \"My wife of 28 years has been cheating on me and my daughters do not believe me. \"    HISTORY OF PRESENT ILLNESS:  This is a 36-year-old  male with no prior psychiatric history, he works in pesticides and is an , who has [place]'s disease, had a thyroidectomy recently and low testosterone, comes to the hospital voluntary because his daughter who works in the prison system found him in his room with his guns laid on his bed and suspected something was wrong as he was paranoid and not in his right mind. Apparently, the patient had made statements that his wife had written on his guns like initials of a man that he believes she is cheating on him because of her vindictive and evil nature. They have been having relationship stress and problems for the last year and a half with no sexual contact. In the last 6 months, she has been more distant, working late at night and at job has a new boss and went away for training this past week. She has never been loving and touch or in words. When he talked to her, he heard a male in the background and she was cursing about it under her breath and when he texted her, she changed her tone. He found some other evidence around the house like an object that was like a red laminated piece of device or paper that he found in her car, he was cleaning up around the house. He thought it was his daughter's, tried to give it to them and they said it was not theirs. When he showed it to his wife, she got flustered and asked where he got it from versus saying it was not hers or it was hers.   On that, he has noted scrapings that when under a certain lighting, he can see statements like \"I like the way you fuck me and make me turn on,\" and other statements like \"I hate you very much Gama\" and different writings all over it. He has kept that as evidence, but he says in trying to bring it (the writing) forth, he messed part of it up, but the other parts that are legible still have information about writing this in her handwriting and he also found a key in the drawer that was not on the regular key rings that she noted for her work and for the house. It was not his daughters, it was not his and has scrapings on it as well showing a room number and she said yes, a new key for office that she does not need to go into, it is like a friend's office and he as an  has worked in her building and knows a lot of the rooms and it seems that she was telling fibs about that so he has suspicions based on these things that something is a lie and signs initially started showing up everywhere around the house and on his personal items and this upset him to think is this happening and he is like \"am I losing my mind. \"  He tried to show his daughters, they could not see it or did not want to see it and so when he was checking his room, he noticed some changes in the ways some of his personal items were arranged. He looked at them and noticed there were some writings on them, and the initials of this individual that he believes his wife is cheating on him with and one of the initials on his guns and when he noticed that, he said \"well, let me check the others\" and he went through his first locked box and found his guns racks were found with their initials all over those. He was trying to clean them and daughter came home and saw the guns out on his bed and did not understand what he was doing. As suspected, he might be losing his mind, called the police on him and he came voluntarily to talk to somebody. PAST PSYCHIATRIC HISTORY:  None. PAST MEDICAL HISTORY:  Alfonso's disease, a thyroid nodule, status post thyroidectomy, a low testosterone.     ALLERGIES:  TO DYE ONLY SMALL HIVES, NOT EVEN AN ITCH. SOCIAL HISTORY:  , 28 years, two daughters, one lives with him and recently had a child and was staying there with them until their house was built. He is an  of 32 years. He has been exposed to organophosphates in the past, but it has been a long time ago as well as DDT and smokes about three quarter packs a day of cigarettes, employed. No drugs or alcohol. States his wife actually has been diagnosed with bipolar disorder, is on treatments, but has not been hospitalized. They have been at odds for the last couple of years and she has openly said him that she hates his guts and cursed him out according the patient and so he has reasons to believe that she is cheating because she has not been with him for over a year and a half and in the last 6 months there was change in her behaviors and attitude. Even the daughters have admitted to her anger and outbursts and irritability unnecessarily towards them as well. There is one incident where he felt that he had an itch bug that infested a tree in his backyard right near his man cave area and he was getting bit often by this and he could not first see the bug and could not figure it out and so the family thought he might have been crazy looking for it until he was able to procure the tree that was infested and he got in and it is right by his man cave which is a garage, which was open all the time and he and his friend were getting bit by the bugs. He exposed and treated the condition. That he was very intense and trying to figure out what it was and was unable to do so and none of them were being eaten or bit by this bug, ladies of the house so he figures that might be one of the reasons why there have been doubts in their mind because of that situation beforehand, but he was never hospitalized and never treated and it was finished when he figured out what the condition was and treated it properly.   So he has never been hospitalized or treated psychiatrically or medically and no family history of dementia that we know of. MENTAL STATUS EXAM:  Adult male, calm, cooperative. Clear, coherent speech of average rate, volume and tone. Mood is depressed. Affect, withdrawn. Thoughts are linear and goal directed. Denies suicidal or homicidal ideations. No auditory or visual hallucinations. Aware of his surroundings, location, and situation, here voluntarily for evaluation of his situation that is disturbing in his relationship. DIAGNOSES:  Adjustment disorder, depressed mood, vs delusional disorder    PLAN:  Admit for safety and stabilization, medication modification as needed, group therapy, individual therapy. ESTIMATED LENGTH OF STAY:  Three to five days. DISPOSITION:  Planning with Social Work. Rule out delusional disorder versus pesticide-induced psychotic episode versus delusion due to general medical condition, his medical unlike thyroid and Alfonso's disease. ALEJANDRO Thakur MD      [id number]  D:  09/30/2022 13:26  T:  09/30/2022 18:17  JOB #:  [id number]

## 2022-09-30 NOTE — PROGRESS NOTES
[facility]: MED RECONCILIATION    Comments/Recommendations:   Patient is a good historian  Per patient, has not taken medications in about 3 days  Patient reports he alternates between using ibuprofen and naproxen for back pain but does not take them both in the same day  Patient willing to hold non-formulary testosterone during admission  Patient denies contrast allergy. States no known drug allergies and wishes to have chart updated. Confirmation with read back was completed with patient and allergy was removed from chart    Information obtained from: Patient and RxQuery    Allergies: Patient has no known allergies. Prior to Admission Medications:   Prior to Admission Medications   Prescriptions Last Dose Informant Patient Reported? Taking? Omeprazole delayed release (PRILOSEC D/R) 20 mg tablet  Self Yes Yes   Sig: Take 20 mg by mouth nightly. b-complex with vitamin c tablet  Self Yes Yes   Sig: Take 1 Tab by mouth daily. cholecalciferol (Vitamin D3) (1000 Units /25 mcg) tablet  Self Yes Yes   Sig: Take 1,000 Units by mouth daily. Indications: prevention of vitamin D deficiency   hydrocortisone (CORTEF) 20 mg tablet  Self Yes Yes   Sig: Take 20 mg by mouth daily. Indications: Clallam's disease   ibuprofen (MOTRIN) 200 mg tablet  Self Yes Yes   Sig: Take 600 mg by mouth two (2) times daily as needed for Pain.   levothyroxine (SYNTHROID) 175 mcg tablet  Self No Yes   Sig: Take 1 Tablet by mouth Daily (before breakfast). multivitamin (ONE A DAY) tablet  Self Yes Yes   Sig: Take 1 Tab by mouth daily. naproxen sodium (Aleve) 220 mg tablet  Self Yes Yes   Sig: Take 220 mg by mouth daily as needed for Pain.   simvastatin (ZOCOR) 40 mg tablet  Self Yes Yes   Sig: Take 40 mg by mouth nightly. Indications: HYPERCHOLESTEROLEMIA   testosterone cypionate (DEPOTESTOTERONE CYPIONATE) 200 mg/mL injection 2 weeks ago Self Yes Yes   Si mg by IntraMUSCular route Once every 2 weeks.         Thank you,  Leyla Goodwin, PharmD, [facility]  [phone]

## 2022-09-30 NOTE — ED PROVIDER NOTES
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    Chest pain 8/22/2017    Chronic back pain 8/13/2017    Chronic insomnia 8/10/2017    Difficulty in urination 8/10/2017    Endocrine disease     yessica's disease    GERD (gastroesophageal reflux disease)     Hypercholesteremia     Hypogonadism male 8/10/2017    Hypothyroidism 8/10/2017    ADDISONS DISEASE    Kidney stones     multiple, has had lithotripsey as well as passed on his own    Low testosterone 8/10/2018    Opioid dependence ([facility] [address] 8/10/2017    Other ill-defined conditions(799.89)     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, on methadone    Psychiatric disorder     ANXIETY    Severe anxiety with panic 12/5/2018    Thumb pain 8/10/2017    Vitamin D deficiency 8/10/2017       Past Surgical History:   Procedure Laterality Date    HX ORTHOPAEDIC      LEFT ROTATER CUFF REPAIR     HX ORTHOPAEDIC  08/2018    LUMBAR FUSION    HX ORTHOPAEDIC Left     CYST REMOVED FROM HAND    HX OTHER SURGICAL      pylonidal cyst    HX OTHER SURGICAL      penial fracture         Family History:   Problem Relation Age of Onset    Cancer Mother         breast    High Cholesterol Mother     Stroke Father     Diabetes Father     High Cholesterol Father     Cancer Sister         breast    High Cholesterol Sister     Cancer Sister         breast    High Cholesterol Sister     Cancer Brother         SKIN CANCER    Other Brother         GOUT    High Cholesterol Brother     Heart Attack Paternal Grandmother     Anesth Problems Neg Hx        Social History     Socioeconomic History    Marital status:      Spouse name: Not on file    Number of children: 2    Years of education: Not on file    Highest education level: Not on file   Occupational History    Not on file   Tobacco Use    Smoking status: Every Day     Packs/day: 1.00     Years: 40.00     Pack years: 40.00     Types: Cigarettes    Smokeless tobacco: Never    Tobacco comments:     LOOKING INTO CHANTIX   Vaping Use    Vaping Use: Never used   Substance and Sexual Activity    Alcohol use: No     Comment: RARELY    Drug use: No    Sexual activity: Not on file   Other Topics Concern    Not on file   Social History Narrative    Not on file     Social Determinants of Health     Financial Resource Strain: Not on file   Food Insecurity: Not on file   Transportation Needs: Not on file   Physical Activity: Not on file   Stress: Not on file   Social Connections: Not on file   Intimate Partner Violence: Not on file   Housing Stability: Not on file         ALLERGIES: Iodinated contrast media and Other medication    Review of Systems    Vitals:    09/29/22 1816 09/30/22 0122   BP: (!) 150/111 (!) 149/95   Pulse: (!) 102 90   Resp: 18 18   Temp: 97.8 °F (36.6 °C) 97.8 °F (36.6 °C)   SpO2: 97% 96%   Weight: 94 kg (207 lb 3.7 oz)    Height: 5' 11\" (1.803 m)             Physical Exam     MDM         Procedures

## 2022-09-30 NOTE — ED NOTES
Spoke with [facility], they are requesting to have BP rechecked and treated if high, MD notifed of BP of 156/101, this RN called [facility] for doctor's number to have physician to speak to physician

## 2022-09-30 NOTE — ED NOTES
Pt denies any substance or alcohol abuse. Pt states he use to be on pills and methadone due to back issues but has been clean for years.

## 2022-09-30 NOTE — GROUP NOTE
[facility]  GROUP DOCUMENTATION INDIVIDUAL                                                                          Group Therapy Note    Date: 9/30/2022    Group Start Time: 0900  Group End Time: 1000  Group Topic: Topic Group    [facility] 3 ACUTE BEHAV TH    [address] GROUP    Group Therapy Note    Attendees: 4       Attendance: Did not attend      Leilani Syed

## 2022-09-30 NOTE — PROGRESS NOTES
Pt is a 65year old white  male admitted VOL for stabilization of  visual hallucinations and concerning behavior. Pt arrived via medical transport on stretcher. Patient admitted from  [facility] ED. Pts allergies include Contrast Dye with hives as a reaction. Pts medical history includes Alfonso's Disease and Thyroid Cancer. Patient alert and oriented X3. Pt denies SI/HI/AVH, Depression 6/10, and anxiety 7/10. Pt cooperative and very talkative during assessment. Pt required several reminders to complete assessment. Pt continued to blame wife for making others believe he is crazy. Pt stated several places where he saw her invisible ink on him including his face and jeans. This Nurse did not see any writing. Skin assessment performed by [facility] RN and Hugh Looney. Skin intact. One bug bite noted on LLQ. Pt reports use of nicotine at 3/4-1PPD, and Alcohol rarely. Pt states he has a past history of cocaine use and oxycodone use, but denies usage of these substances recently. This is pts first inpatient psychiatric admission. 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 Pt was able to contract for safety. Pt oriented to unit and all questions answered. Q15 minute checks initiated for safety.

## 2022-09-30 NOTE — PROGRESS NOTES
Problem: Falls - Risk of  Goal: *Absence of Falls  Description: Document Martha Litter Fall Risk and appropriate interventions in the flowsheet.   Outcome: Progressing Towards Goal  Note: Fall Risk Interventions:   Medication Interventions: Teach patient to arise slowly  Problem: Altered Thought Process (Adult/Pediatric)  Goal: *STG: Remains safe in hospital  Outcome: Progressing Towards Goal  Goal: *STG: Seeks staff when feelings of anxiety and fear arise  Outcome: Progressing Towards Goal

## 2022-10-01 PROBLEM — F22 DELUSIONAL DISORDER (HCC): Status: ACTIVE | Noted: 2022-10-01

## 2022-10-01 PROCEDURE — 99232 SBSQ HOSP IP/OBS MODERATE 35: CPT | Performed by: PSYCHIATRY & NEUROLOGY

## 2022-10-01 PROCEDURE — 90836 PSYTX W PT W E/M 45 MIN: CPT | Performed by: PSYCHIATRY & NEUROLOGY

## 2022-10-01 PROCEDURE — 74011250637 HC RX REV CODE- 250/637: Performed by: NURSE PRACTITIONER

## 2022-10-01 PROCEDURE — 65220000003 HC RM SEMIPRIVATE PSYCH

## 2022-10-01 PROCEDURE — 90785 PSYTX COMPLEX INTERACTIVE: CPT | Performed by: PSYCHIATRY & NEUROLOGY

## 2022-10-01 PROCEDURE — 74011250637 HC RX REV CODE- 250/637: Performed by: PSYCHIATRY & NEUROLOGY

## 2022-10-01 RX ORDER — ARIPIPRAZOLE 5 MG/1
10 TABLET ORAL DAILY
Status: DISCONTINUED | OUTPATIENT
Start: 2022-10-01 | End: 2022-10-02

## 2022-10-01 RX ORDER — ARIPIPRAZOLE 5 MG/1
10 TABLET ORAL DAILY
Status: DISCONTINUED | OUTPATIENT
Start: 2022-10-01 | End: 2022-10-01

## 2022-10-01 RX ORDER — IBUPROFEN 200 MG
1 TABLET ORAL
Status: DISCONTINUED | OUTPATIENT
Start: 2022-10-01 | End: 2022-10-06 | Stop reason: HOSPADM

## 2022-10-01 RX ADMIN — TRAZODONE HYDROCHLORIDE 50 MG: 50 TABLET ORAL at 21:28

## 2022-10-01 RX ADMIN — HYDROCORTISONE 20 MG: 10 TABLET ORAL at 06:27

## 2022-10-01 RX ADMIN — ARIPIPRAZOLE 10 MG: 5 TABLET ORAL at 14:00

## 2022-10-01 RX ADMIN — Medication 2 MG: at 17:18

## 2022-10-01 RX ADMIN — LEVOTHYROXINE SODIUM 175 MCG: 125 TABLET ORAL at 06:27

## 2022-10-01 RX ADMIN — ACETAMINOPHEN 650 MG: 325 TABLET, FILM COATED ORAL at 08:25

## 2022-10-01 NOTE — BH NOTES
PSYCHOTHERAPY SESSION NOTE     Patient Name  Janell Maldonado. Date of Birth 1965   [facility] [id number]   Medical Record Number  [id number]      Age  65 y.o. PCP None   Admit date:  9/30/2022    Room Number  324/01  @ [facility]   Date of Service  10/1/2022            Length of psychotherapy session: 45 minutes    Main condition/diagnosis/issues treated during session today, 10/1/2022 : delusional disorder    I employed Cognitive Behavioral therapy techniques, Reality-Oriented psychotherapy, as well as supportive psychotherapy in regards to various ongoing psychosocial stressors, including the following: pre-admission and current problems; housing issues; occupational issues; medical issues; and stress of hospitalization. Interpersonal relationship issues and psychodynamic conflicts explored. Attempts made to alleviate maladaptive patterns. Session focused on the patient's experiences at home and his relationship with his wife. The patient shares that his marriage is essentially over but he is hopeful to prove some of the allegations about his wife cheating. Overall, patient is not progressing. Treatment Plan Update (reviewed and updated 10/1/2022) : I will modify psychotherapy tx plan by implementing more stress management strategies, building upon cognitive behavioral techniques, increasing coping skills, as well as shoring up psychological defenses). An extended energy and skill set was needed to engage pt in psychotherapy due to some of the following: resistiveness, complexity, negativity, confrontational nature, hostile behaviors, and/or severe abnormalities in thought processes/psychosis resulting in the loss of expressive/receptive language communication skills.

## 2022-10-01 NOTE — BH NOTES
Pt is alert and oriented x 4. He is calm and cooperative at this time. Pt denies SI/HI, AVH. He reports that he feels bad because he caught his wife cheating on him. Pt is working on coping skills and has a bright outlook.  There are no other issue to note at this time

## 2022-10-01 NOTE — BH NOTES
PSYCHIATRIC PROGRESS NOTE         Patient Name  Alexis Horn. Date of Birth 1965   [facility] [id number]   Medical Record Number  [id number]      Age  65 y.o. PCP None   Admit date:  9/30/2022    Room Number  324/01  @ [facility]   Date of Service  10/1/2022         E & M PROGRESS NOTE:         HISTORY       CC:  \"psychosis\"  HISTORY OF PRESENT ILLNESS/INTERVAL HISTORY:  (reviewed/updated 10/1/2022). per initial evaluation: This is a 58-year-old  male with no prior psychiatric history, he works in pesticides and is an , who has Salem's disease, had a thyroidectomy recently and low testosterone, comes to the hospital voluntary because his daughter who works in the snf system found him in his room with his guns laid on his bed and suspected something was wrong as he was paranoid and not in his right mind. Apparently, the patient had made statements that his wife had written on his guns like initials of a man that he believes she is cheating on him because of her vindictive and evil nature. They have been having relationship stress and problems for the last year and a half with no sexual contact. In the last 6 months, she has been more distant, working late at night and at job has a new boss and went away for training this past week. She has never been loving and touch or in words. When he talked to her, he heard a male in the background and she was cursing about it under her breath and when he texted her, she changed her tone. He found some other evidence around the house like an object that was like a red laminated piece of device or paper that he found in her car, he was cleaning up around the house. He thought it was his daughter's, tried to give it to them and they said it was not theirs. When he showed it to his wife, she got flustered and asked where he got it from versus saying it was not hers or it was hers.   On that, he has noted scrapings that when under a certain lighting, he can see statements like \"I like the way you fuck me and make me turn on,\" and other statements like \"I hate you very much and different writings all over it. He has kept that as evidence, but he says bring it forth the mess part of it up, but the other parts that are legible still have information about writing this in her handwriting and he also found a key in the drawer that was not on the regular key rings that she noted for her work and for the house. It was not his daughters, it was not his and has scrapings on it as well showing a room number and she said yes, a new key for office that she does not need to go into, it is like a friend's office and he as an  has worked in her building and knows a lot of the rooms and it seems that she was telling fibs about that so he has suspicions based on these things that something is a lie and she initially started showing up everywhere around the house and on his personal items and this upset him to think is this happening and he is like \"am I losing my mind. \"  He tried to show his daughters, they could not see it or did not want to see it and so when he was checking his room, he noticed some changes in the ways some of his personal items were arranged. He looked at them and noticed there were some writings on them, and the initials of this individual that he believes his wife is cheating on him with and one of the initials on his guns and when he noticed that, he said \"well, let me check the others\" and he went through his first locked box and found his guns racks were found with their initials all over those. He was trying to clean them and daughter came home and saw the guns out on his bed and did not understand what he was doing. As suspected, he might be losing his mind, called the police on him and he came voluntarily to talk to somebody.     10/01 - the patient has been visible, oddly related and with ongoing difficulty with reality testing. He has been experiencing VH including seeing words written on objects including a pair of jeans, though this is not apparent when he goes to show MD. The patient is discharge focused, with ongoing PI toward his family and his wife in particular. He acknowledges that his thoughts may represent delusions and is amenable to starting an antipsychotic. SIDE EFFECTS: (reviewed/updated 10/1/2022)  None reported or admitted to. ALLERGIES:(reviewed/updated 10/1/2022)  No Known Allergies   REVIEW OF SYSTEMS: (reviewed/updated 10/1/2022)  Appetite:no change from normal   Sleep: no change   All other Review of Systems: Negative except per HPI         [address] (MSE):    MSE FINDINGS ARE WITHIN NORMAL LIMITS (WNL) UNLESS OTHERWISE STATED BELOW. ( ALL OF THE BELOW CATEGORIES OF THE MSE HAVE BEEN REVIEWED (reviewed 10/1/2022) AND UPDATED AS DEEMED APPROPRIATE )  General Presentation age appropriate, cooperative   Orientation not oriented to situation   Vital Signs  See below (reviewed 10/1/2022); Vital Signs (BP, Pulse, & Temp) are within normal limits if not listed below.    Gait and Station Stable/steady, no ataxia   Musculoskeletal System No extrapyramidal symptoms (EPS); no abnormal muscular movements or Tardive Dyskinesia (TD); muscle strength and tone are within normal limits   Language No aphasia or dysarthria   Speech:  normal volume and non-pressured   Thought Processes illogical; normal rate of thoughts; fair abstract reasoning/computation   Thought Associations goal directed   Thought Content paranoid delusions and visual hallucinations   Suicidal Ideations none   Homicidal Ideations none   Mood:  anxious    Affect:  constricted   Memory recent  intact   Memory remote:  intact   Concentration/Attention:  intact   Fund of Knowledge average   Insight:  poor   Reliability fair   Judgment:  poor          VITALS:     Patient Vitals for the past 24 hrs:   Temp Pulse Resp BP SpO2   10/01/22 0756 98.3 °F (36.8 °C) 85 16 (!) 152/96 99 %   09/30/22 2038 98.5 °F (36.9 °C) 89 18 (!) 131/100 99 %     Wt Readings from Last 3 Encounters:   09/29/22 94 kg (207 lb 3.7 oz)   06/10/22 92.1 kg (203 lb)   06/08/22 92.5 kg (203 lb 14.8 oz)     Temp Readings from Last 3 Encounters:   10/01/22 98.3 °F (36.8 °C)   09/30/22 98.4 °F (36.9 °C)   06/11/22 99.2 °F (37.3 °C)     BP Readings from Last 3 Encounters:   10/01/22 (!) 152/96   09/30/22 136/78   06/11/22 127/79     Pulse Readings from Last 3 Encounters:   10/01/22 85   09/30/22 84   06/11/22 89            DATA     LABORATORY DATA:(reviewed/updated 10/1/2022)  No results found for this or any previous visit (from the past 24 hour(s)). No results found for: VALF2, VALAC, VALP, VALPR, DS6, CRBAM, CRBAMP, CARB2, XCRBAM  No results found for: LITHM   RADIOLOGY REPORTS:(reviewed/updated 10/1/2022)  NM THYROID METS WH BODY    Result Date: 8/8/2022  EXAM: NM THYROID METS WH BODY INDICATION: Thyroid cancer. COMPARISON: None. CORRELATIVE IMAGING STUDIES: None TRACER: Iodine 131. PROCEDURE: Whole body I-131 scan was performed one week posttreatment with second delayed image of the abdomen and pelvis. There is residual activity in the thyroid bed. On the posterior image, there is a small focus of increased activity in the left upper quadrant which persist on delayed imaging     1. There is residual activity in the thyroid bed. 2. On the posterior image, there is a small focus of activity in the left upper quadrant which could be related to bowel or the left kidney. Yair Bland However on delayed imaging, there is vague persistent activity in this region. . This still may be related to activity in the bowel or left kidney but correlation with CT of the abdomen is recommended for further assessment. 23X     NM RADIONUCLIDE ABLATION THERAPY    Result Date: 7/29/2022  EXAM: NM RADIONUCLIDE ABLATION THERAPY INDICATION: Thyroid cancer. COMPARISON: None. CORRELATIVE IMAGING STUDIES: None TRACER: Iodine 131. PROCEDURE: Radioactive iodine treatment was performed following explanation of risks and benefits of the procedure. Postprocedural precautions were discussed with the patient and provided in writing. The patient's questions were answered. Treatment was accomplished with oral administration of 101.6 mCi Iodine 131. There was no immediate complication. The patient was asked to follow up with the ordering physician. I-131 treatment performed. The patient is scheduled for whole body imaging to be performed next week. MRI ABD W WO CONT    Result Date: 9/19/2022  EXAM:  MRI ABD W WO CONT INDICATION: Liver lesion. COMPARISON: CT performed on 8/22/2022 and 5/29/2009 TECHNIQUE: Coronal T2 and postcontrast T1; Axial T2, in/out of phase, MRCP, pre- and dynamic postcontrast T1 MRI of the abdomen. 20 mL of ProHance. Subtractions were performed. FINDINGS: Liver: Segment 4A lesion demonstrates T2 hyperintensity. Nodular discontinuous peripheral enhancement on arterial phase with centripetal filling on delayed images. Biliary tree: Gallbladder is unremarkable. No biliary dilatation. Pancreas: Unremarkable Spleen: Unremarkable Adrenal glands: Unremarkable Kidneys: Bilateral simple renal cysts. No further follow-up is indicated Vasculature: Unremarkable Bowel: Unremarkable Lymph nodes: No lymphadenopathy is identified. Miscellaneous: None     1. Lesion segment 4A of the liver demonstrates nodular peripheral discontinuous enhancement with centripetal filling and T2 hyperintensity most consistent with benign a hemangioma. Review of a prior study from 2009 which was performed without contrast demonstrates a possible lesion in this region which was poorly visualized and incompletely characterized but without significant change further supporting a diagnosis of hemangioma.  2.  Other incidental findings as above     CT ABD W WO CONT    Result Date: 8/22/2022  EXAM: CT ABD W WO CONT INDICATION: Evaluate liver lesion. COMPARISON: CT thorax 11/26/2021. IV CONTRAST: 100 mL of Isovue-370. ORAL CONTRAST: None. TECHNIQUE:  Multislice helical CT was performed from the diaphragm to the iliac crest prior to and during rapid bolus intravenous contrast administration. Contiguous 5 mm axial images were reconstructed and lung and soft tissue windows were generated. Coronal and sagittal reformations were generated. CT dose reduction was achieved through use of a standardized protocol tailored for this examination and automatic exposure control for dose modulation. FINDINGS: LOWER THORAX: Visualized lung bases are clear. The visualized heart is normal in size without pericardial effusion. Coronary artery calcifications are noted. LIVER: There is a 1.9 x 1.9 cm segment 4A arterial phase hyperenhancing lesion which shows very subtle portal venous hyperenhancement and is isoenhancing to liver on equilibrium phase and there is slightly hypodense on unenhanced CT. A peripheral wedge-shaped transient arterial phase hyperenhancing lesion is seen posteriorly at segment 7 of the dome. No other focal lesions with normal enhancement of hepatic vascular structures and normal morphology. BILIARY TREE: Gallbladder is within normal limits. CBD is not dilated. SPLEEN: within normal limits. PANCREAS: No mass or ductal dilatation. ADRENALS: Unremarkable. KIDNEYS: 3.2 cm exophytic cyst at the lateral upper pole of the left kidney for which no follow-up is required. No calculus, hydronephrosis, or other focal renal abnormalities. STOMACH: Small hiatal hernia. Otherwise unremarkable. VISUALIZED BOWEL: There is concern for polypoid hypodensity within central small bowel measuring 10 x 9 and 8 x 9 mm which show persistent hyperdense on postcontrast phases with progressive confluent hypodensity seen on delayed imaging which is not evident on precontrast imaging. No dilatation or wall thickening. APPENDIX: Unremarkable in visualized extent with tip below the field of view. PERITONEUM: No ascites or pneumoperitoneum. RETROPERITONEUM: Atherosclerotic calcination without aneurysm or dissection. No enlarged lymphadenopathy. BONES: Degenerative spine change with left lateral plate and screw hardware transfixing L5 2-3 with intervertebral disc prostheses. No acute fracture or aggressive lesion. ABDOMINAL WALL: No mass or hernia. ADDITIONAL COMMENTS: N/A     Hyperenhancing segment 4A lesion within liver show CT characteristics most compatible with benign focal nodular hyperplasia, however in the setting of possible polypoid small bowel hyperenhancing masses versus radiodense enteric contents concern is raised for possible neoplasm such as carcinoid tumor and further characterization with dedicated liver MRI should be considered.  23X         MEDICATIONS     ALL MEDICATIONS:   Current Facility-Administered Medications   Medication Dose Route Frequency    nicotine (NICODERM CQ) 14 mg/24 hr patch 1 Patch  1 Patch TransDERmal DAILY PRN    ARIPiprazole (ABILIFY) tablet 10 mg  10 mg Oral DAILY    OLANZapine (ZyPREXA) tablet 5 mg  5 mg Oral Q6H PRN    haloperidol lactate (HALDOL) injection 5 mg  5 mg IntraMUSCular Q6H PRN    benztropine (COGENTIN) tablet 1 mg  1 mg Oral BID PRN    diphenhydrAMINE (BENADRYL) injection 50 mg  50 mg IntraMUSCular BID PRN    hydrOXYzine HCL (ATARAX) tablet 50 mg  50 mg Oral TID PRN    traZODone (DESYREL) tablet 50 mg  50 mg Oral QHS PRN    acetaminophen (TYLENOL) tablet 650 mg  650 mg Oral Q4H PRN    magnesium hydroxide (MILK OF MAGNESIA) 400 mg/5 mL oral suspension 30 mL  30 mL Oral DAILY PRN    ibuprofen (MOTRIN) tablet 400 mg  400 mg Oral Q8H PRN    nicotine buccal (POLACRILEX) lozenge 2 mg  2 mg Oral Q2H PRN    midazolam (VERSED) injection 2 mg  2 mg IntraMUSCular Q4H PRN    hydrocortisone (CORTEF) tablet 20 mg  20 mg Oral ACB    levothyroxine (SYNTHROID) tablet 175 mcg  175 mcg Oral ACB SCHEDULED MEDICATIONS:   Current Facility-Administered Medications   Medication Dose Route Frequency    ARIPiprazole (ABILIFY) tablet 10 mg  10 mg Oral DAILY    hydrocortisone (CORTEF) tablet 20 mg  20 mg Oral ACB    levothyroxine (SYNTHROID) tablet 175 mcg  175 mcg Oral ACB          ASSESSMENT & PLAN     DIAGNOSES REQUIRING ACTIVE TREATMENT AND MONITORING: (reviewed/updated 10/1/2022)  Patient Active Hospital Problem List:      Psychosis(HCC) (9/30/2022)           Assessment: patient presents with paranoid delusions in setting of external stressors. He has little insight into his condition and has been unable to reality test. Will treat symptomatically. Plan:   - START Abilify 10 mg QDAY for psychosis  - IGM therapy as tolerated  - Collateral (daughter Elvis Unger [phone])  - Dispo planning (home when stable)    In summary, Murali Jensen, is a 65 y.o.  male who presents with a severe exacerbation of the principal diagnosis of <principal problem not specified>    Patient's condition is not improving. Patient requires continued inpatient hospitalization for further stabilization, safety monitoring and medication management. I will continue to coordinate the provision of individual, milieu, occupational, group, and substance abuse therapies to address target symptoms/diagnoses as deemed appropriate for the individual patient. A coordinated, multidisplinary treatment team round was conducted with the patient (this team consists of the nurse, psychiatric unit pharmacist,  and writer). Complete current electronic health record for patient has been reviewed today including consultant notes, ancillary staff notes, nurses and psychiatric tech notes. Suicide risk assessment completed and patient deemed to be of low risk for suicide at this time. The following regarding medications was addressed during rounds with patient:   the risks and benefits of the proposed medication. The patient was given the opportunity to ask questions. Informed consent given to the use of the above medications. Will continue to adjust psychiatric and non-psychiatric medications (see above \"medication\" section and orders section for details) as deemed appropriate & based upon diagnoses and response to treatment. I will continue to order blood tests/labs and diagnostic tests as deemed appropriate and review results as they become available (see orders for details and above listed lab/test results). I will order psychiatric records from previous [facility] hospitals to further elucidate the nature of patient's psychopathology and review once available. I will gather additional collateral information from friends, family and o/p treatment team to further elucidate the nature of patient's psychopathology and baselline level of psychiatric functioning. I certify that this patient's inpatient psychiatric hospital services furnished since the previous certification were, and continue to be, required for treatment that could reasonably be expected to improve the patient's condition, or for diagnostic study, and that the patient continues to need, on a daily basis, active treatment furnished directly by or requiring the supervision of inpatient psychiatric facility personnel. In addition, the hospital records show that services furnished were intensive treatment services, admission or related services, or equivalent services.     EXPECTED DISCHARGE DATE/DAY: TBD     DISPOSITION: Home       Signed By:   Karyna Lacey MD  10/1/2022

## 2022-10-01 NOTE — PROGRESS NOTES
Problem: Falls - Risk of  Goal: *Absence of Falls  Description: Document Martha Litter Fall Risk and appropriate interventions in the flowsheet.   Outcome: Progressing Towards Goal  Note: Fall Risk Interventions:            Medication Interventions: Teach patient to arise slowly                   Problem: Altered Thought Process (Adult/Pediatric)  Goal: *STG: Remains safe in hospital  Outcome: Progressing Towards Goal

## 2022-10-01 NOTE — PROGRESS NOTES
Behavioral Services  Medicare Certification Upon Admission    I certify that this patient's inpatient psychiatric hospital admission is medically necessary for:    [x] (1) Treatment which could reasonably be expected to improve this patient's condition,       [x] (2) Or for diagnostic study;     AND     [x](2) The inpatient psychiatric services are provided while the individual is under the care of a physician and are included in the individualized plan of care.     Estimated length of stay/service 5-7 days    Plan for post-hospital care home    Electronically signed by Dallas Ramos MD on 10/1/2022 at 5:19 PM · See HPI and AP above

## 2022-10-02 PROCEDURE — 74011250637 HC RX REV CODE- 250/637: Performed by: PSYCHIATRY & NEUROLOGY

## 2022-10-02 PROCEDURE — 65220000003 HC RM SEMIPRIVATE PSYCH

## 2022-10-02 PROCEDURE — 99232 SBSQ HOSP IP/OBS MODERATE 35: CPT | Performed by: PSYCHIATRY & NEUROLOGY

## 2022-10-02 PROCEDURE — 74011250637 HC RX REV CODE- 250/637: Performed by: NURSE PRACTITIONER

## 2022-10-02 RX ORDER — ARIPIPRAZOLE 15 MG/1
15 TABLET ORAL DAILY
Status: DISCONTINUED | OUTPATIENT
Start: 2022-10-03 | End: 2022-10-06

## 2022-10-02 RX ORDER — ATORVASTATIN CALCIUM 10 MG/1
20 TABLET, FILM COATED ORAL
Status: DISCONTINUED | OUTPATIENT
Start: 2022-10-02 | End: 2022-10-06 | Stop reason: HOSPADM

## 2022-10-02 RX ORDER — PANTOPRAZOLE SODIUM 40 MG/1
40 TABLET, DELAYED RELEASE ORAL
Status: DISCONTINUED | OUTPATIENT
Start: 2022-10-02 | End: 2022-10-06 | Stop reason: HOSPADM

## 2022-10-02 RX ORDER — ASCORBIC ACID 500 MG
500 TABLET ORAL DAILY
Status: DISCONTINUED | OUTPATIENT
Start: 2022-10-03 | End: 2022-10-06 | Stop reason: HOSPADM

## 2022-10-02 RX ADMIN — TRAZODONE HYDROCHLORIDE 50 MG: 50 TABLET ORAL at 21:11

## 2022-10-02 RX ADMIN — ACETAMINOPHEN 650 MG: 325 TABLET, FILM COATED ORAL at 17:54

## 2022-10-02 RX ADMIN — ATORVASTATIN CALCIUM 20 MG: 10 TABLET, FILM COATED ORAL at 22:09

## 2022-10-02 RX ADMIN — LEVOTHYROXINE SODIUM 175 MCG: 125 TABLET ORAL at 06:08

## 2022-10-02 RX ADMIN — ACETAMINOPHEN 650 MG: 325 TABLET, FILM COATED ORAL at 08:33

## 2022-10-02 RX ADMIN — ARIPIPRAZOLE 10 MG: 5 TABLET ORAL at 08:33

## 2022-10-02 RX ADMIN — Medication 2 MG: at 12:44

## 2022-10-02 RX ADMIN — Medication 2 MG: at 21:11

## 2022-10-02 RX ADMIN — PANTOPRAZOLE SODIUM 40 MG: 40 TABLET, DELAYED RELEASE ORAL at 22:09

## 2022-10-02 RX ADMIN — Medication 2 MG: at 17:53

## 2022-10-02 RX ADMIN — HYDROCORTISONE 20 MG: 10 TABLET ORAL at 06:08

## 2022-10-02 NOTE — PROGRESS NOTES
Received patient sitting quietly in the dayroom. Pt is alert and oriented x 4. Pt is interactive with peers and staff. On assessment, pt denied anxiety, depression, HI, SI and AVH. PRN trazodone administered. Vital signs entered. Will keep monitoring. Pt slept for the total of 8.25 hours.      Problem: Altered Thought Process (Adult/Pediatric)  Goal: *STG: Participates in treatment plan  Outcome: Progressing Towards Goal  Goal: *STG: Remains safe in hospital  Outcome: Progressing Towards Goal  Goal: *STG: Seeks staff when feelings of anxiety and fear arise  Outcome: Progressing Towards Goal

## 2022-10-02 NOTE — PROGRESS NOTES
Writer met patient in room. Patient observed to be resting quietly in bed, awake, and appears free of distress. Patient calm and cooperative with vital signs and assessment. Patient A/O x 4 with clear speech. Patient ambulates with a steady gait. Patient denies depression, SI, HI, and AVH. Patient endorses anxiety (4/10) and back pain (3/10). Patient, also, reporting a headache that started past night. Patient given PRN Tylenol (see MAR). Patient hyper focused on medical needs and somatic complaints. Patient reports taking Simvastatin 40 mg once daily at bedtime and omeprazole nightly that he has not had since being in the hospital.  Writer reported this to the provider. Patient remains isolative to room except for meals. Patient meal and medication compliant.     Problem: Altered Thought Process (Adult/Pediatric)  Goal: *STG: Remains safe in hospital  Outcome: Progressing Towards Goal  Goal: *STG: Complies with medication therapy  Outcome: Progressing Towards Goal     Problem: Altered Thought Process (Adult/Pediatric)  Goal: *STG: Seeks staff when feelings of anxiety and fear arise  Outcome: Not Progressing Towards Goal     Problem: Psychosis  Goal: *STG: Develop safety plan for times when triggered in stressful situations  Outcome: Not Progressing Towards Goal

## 2022-10-03 LAB
CHOLEST SERPL-MCNC: 182 MG/DL
EST. AVERAGE GLUCOSE BLD GHB EST-MCNC: 126 MG/DL
HBA1C MFR BLD: 6 % (ref 4–5.6)
HDLC SERPL-MCNC: 34 MG/DL
HDLC SERPL: 5.4 {RATIO} (ref 0–5)
LDLC SERPL CALC-MCNC: 104.4 MG/DL (ref 0–100)
TRIGL SERPL-MCNC: 218 MG/DL (ref ?–150)
VLDLC SERPL CALC-MCNC: 43.6 MG/DL

## 2022-10-03 PROCEDURE — 83036 HEMOGLOBIN GLYCOSYLATED A1C: CPT

## 2022-10-03 PROCEDURE — 74011250637 HC RX REV CODE- 250/637: Performed by: PSYCHIATRY & NEUROLOGY

## 2022-10-03 PROCEDURE — 80061 LIPID PANEL: CPT

## 2022-10-03 PROCEDURE — 36415 COLL VENOUS BLD VENIPUNCTURE: CPT

## 2022-10-03 PROCEDURE — 65220000003 HC RM SEMIPRIVATE PSYCH

## 2022-10-03 PROCEDURE — 74011250637 HC RX REV CODE- 250/637: Performed by: NURSE PRACTITIONER

## 2022-10-03 RX ORDER — FLUTICASONE PROPIONATE 50 MCG
2 SPRAY, SUSPENSION (ML) NASAL
Status: DISCONTINUED | OUTPATIENT
Start: 2022-10-03 | End: 2022-10-06 | Stop reason: HOSPADM

## 2022-10-03 RX ADMIN — ARIPIPRAZOLE 15 MG: 15 TABLET ORAL at 08:22

## 2022-10-03 RX ADMIN — LEVOTHYROXINE SODIUM 175 MCG: 125 TABLET ORAL at 06:29

## 2022-10-03 RX ADMIN — Medication 2 MG: at 08:22

## 2022-10-03 RX ADMIN — Medication 2 MG: at 17:13

## 2022-10-03 RX ADMIN — ATORVASTATIN CALCIUM 20 MG: 10 TABLET, FILM COATED ORAL at 21:28

## 2022-10-03 RX ADMIN — TRAZODONE HYDROCHLORIDE 50 MG: 50 TABLET ORAL at 21:28

## 2022-10-03 RX ADMIN — HYDROCORTISONE 20 MG: 10 TABLET ORAL at 06:29

## 2022-10-03 RX ADMIN — PANTOPRAZOLE SODIUM 40 MG: 40 TABLET, DELAYED RELEASE ORAL at 21:28

## 2022-10-03 RX ADMIN — OXYCODONE HYDROCHLORIDE AND ACETAMINOPHEN 500 MG: 500 TABLET ORAL at 08:20

## 2022-10-03 NOTE — BH NOTES
PSYCHIATRIC PROGRESS NOTE         Patient Name  Paddy Mcleod. Date of Birth 1965   [facility] [id number]   Medical Record Number  [id number]      Age  65 y.o. PCP None   Admit date:  9/30/2022    Room Number  324/01  @ [facility]   Date of Service  10/3/2022         E & M PROGRESS NOTE:         HISTORY       CC:  \"psychosis\"  HISTORY OF PRESENT ILLNESS/INTERVAL HISTORY:  (reviewed/updated 10/3/2022). per initial evaluation: This is a 71-year-old  male with no prior psychiatric history, he works in pesticides and is an , who has Providence's disease, had a thyroidectomy recently and low testosterone, comes to the hospital voluntary because his daughter who works in the prison system found him in his room with his guns laid on his bed and suspected something was wrong as he was paranoid and not in his right mind. Apparently, the patient had made statements that his wife had written on his guns like initials of a man that he believes she is cheating on him because of her vindictive and evil nature. They have been having relationship stress and problems for the last year and a half with no sexual contact. In the last 6 months, she has been more distant, working late at night and at job has a new boss and went away for training this past week. She has never been loving and touch or in words. When he talked to her, he heard a male in the background and she was cursing about it under her breath and when he texted her, she changed her tone. He found some other evidence around the house like an object that was like a red laminated piece of device or paper that he found in her car, he was cleaning up around the house. He thought it was his daughter's, tried to give it to them and they said it was not theirs. When he showed it to his wife, she got flustered and asked where he got it from versus saying it was not hers or it was hers.   On that, he has noted scrapings that when under a certain lighting, he can see statements like \"I like the way you fuck me and make me turn on,\" and other statements like \"I hate you very much and different writings all over it. He has kept that as evidence, but he says bring it forth the mess part of it up, but the other parts that are legible still have information about writing this in her handwriting and he also found a key in the drawer that was not on the regular key rings that she noted for her work and for the house. It was not his daughters, it was not his and has scrapings on it as well showing a room number and she said yes, a new key for office that she does not need to go into, it is like a friend's office and he as an  has worked in her building and knows a lot of the rooms and it seems that she was telling fibs about that so he has suspicions based on these things that something is a lie and she initially started showing up everywhere around the house and on his personal items and this upset him to think is this happening and he is like \"am I losing my mind. \"  He tried to show his daughters, they could not see it or did not want to see it and so when he was checking his room, he noticed some changes in the ways some of his personal items were arranged. He looked at them and noticed there were some writings on them, and the initials of this individual that he believes his wife is cheating on him with and one of the initials on his guns and when he noticed that, he said \"well, let me check the others\" and he went through his first locked box and found his guns racks were found with their initials all over those. He was trying to clean them and daughter came home and saw the guns out on his bed and did not understand what he was doing. As suspected, he might be losing his mind, called the police on him and he came voluntarily to talk to somebody.     10/01 - the patient has been visible, oddly related and with ongoing difficulty with reality testing. He has been experiencing VH including seeing words written on objects including a pair of jeans, though this is not apparent when he goes to show MD. The patient is discharge focused, with ongoing PI toward his family and his wife in particular. He acknowledges that his thoughts may represent delusions and is amenable to starting an antipsychotic. 10/02 - the patient has been visible, cooperative and with no instances of agitation or aggression. He slept 8.25 hours overnight and is medication compliant. Patient notably states that he is no longer experiencing VH of words appearing on his jeans. He is discharge focused and thus far tolerating the medication. Patient inquires about if his thyroid or steroid medication can be causing his symptoms. Education provided. Patient c/o back pain, headache, and inquires about restarting his statin and GERD medication. 10/03 - Carlos A Carrasco. reports feeling well and moods are good. Denies SI/HI/AH/VH. No aggression or violence. Has OCD and acknowledges going to far with it. Appropriately interactive and aware. Tolerating medications well. Eating and sleeping fairly. Discharge focused. SIDE EFFECTS: (reviewed/updated 10/3/2022)  None reported or admitted to. ALLERGIES:(reviewed/updated 10/3/2022)  No Known Allergies   REVIEW OF SYSTEMS: (reviewed/updated 10/3/2022)  Appetite:no change from normal   Sleep: no change   All other Review of Systems: Negative except per HPI         [facility] (MSE):    MSE FINDINGS ARE WITHIN NORMAL LIMITS (WNL) UNLESS OTHERWISE STATED BELOW. ( ALL OF THE BELOW CATEGORIES OF THE MSE HAVE BEEN REVIEWED (reviewed 10/3/2022) AND UPDATED AS DEEMED APPROPRIATE )  General Presentation age appropriate, cooperative   Orientation not oriented to situation   Vital Signs  See below (reviewed 10/3/2022);  Vital Signs (BP, Pulse, & Temp) are within normal limits if not listed below.    Gait and Station Stable/steady, no ataxia   Musculoskeletal System No extrapyramidal symptoms (EPS); no abnormal muscular movements or Tardive Dyskinesia (TD); muscle strength and tone are within normal limits   Language No aphasia or dysarthria   Speech:  normal volume and non-pressured   Thought Processes illogical; normal rate of thoughts; fair abstract reasoning/computation   Thought Associations goal directed   Thought Content paranoid delusions and visual hallucinations   Suicidal Ideations none   Homicidal Ideations none   Mood:  anxious    Affect:  constricted   Memory recent  intact   Memory remote:  intact   Concentration/Attention:  intact   Fund of Knowledge average   Insight:  poor   Reliability fair   Judgment:  poor          VITALS:     Patient Vitals for the past 24 hrs:   Temp Pulse Resp BP SpO2   10/03/22 0829 98.5 °F (36.9 °C) 84 18 (!) 145/92 98 %   10/02/22 1930 98.1 °F (36.7 °C) 75 16 (!) 140/87 --       Wt Readings from Last 3 Encounters:   09/29/22 94 kg (207 lb 3.7 oz)   06/10/22 92.1 kg (203 lb)   06/08/22 92.5 kg (203 lb 14.8 oz)     Temp Readings from Last 3 Encounters:   10/03/22 98.5 °F (36.9 °C)   09/30/22 98.4 °F (36.9 °C)   06/11/22 99.2 °F (37.3 °C)     BP Readings from Last 3 Encounters:   10/03/22 (!) 145/92   09/30/22 136/78   06/11/22 127/79     Pulse Readings from Last 3 Encounters:   10/03/22 84   09/30/22 84   06/11/22 89            DATA     LABORATORY DATA:(reviewed/updated 10/3/2022)  Recent Results (from the past 24 hour(s))   HEMOGLOBIN A1C WITH EAG    Collection Time: 10/03/22  6:05 AM   Result Value Ref Range    Hemoglobin A1c 6.0 (H) 4.0 - 5.6 %    Est. average glucose 126 mg/dL   LIPID PANEL    Collection Time: 10/03/22  6:05 AM   Result Value Ref Range    Cholesterol, total 182 <200 MG/DL    Triglyceride 218 (H) <150 MG/DL    HDL Cholesterol 34 MG/DL    LDL, calculated 104.4 (H) 0 - 100 MG/DL    VLDL, calculated 43.6 MG/DL    CHOL/HDL Ratio 5.4 (H) 0.0 - 5.0       No results found for: VALF2, VALAC, VALP, VALPR, DS6, CRBAM, CRBAMP, CARB2, XCRBAM  No results found for: LITHM   RADIOLOGY REPORTS:(reviewed/updated 10/3/2022)  NM THYROID METS WH BODY    Result Date: 8/8/2022  EXAM: NM THYROID METS WH BODY INDICATION: Thyroid cancer. COMPARISON: None. CORRELATIVE IMAGING STUDIES: None TRACER: Iodine 131. PROCEDURE: Whole body I-131 scan was performed one week posttreatment with second delayed image of the abdomen and pelvis. There is residual activity in the thyroid bed. On the posterior image, there is a small focus of increased activity in the left upper quadrant which persist on delayed imaging     1. There is residual activity in the thyroid bed. 2. On the posterior image, there is a small focus of activity in the left upper quadrant which could be related to bowel or the left kidney. [facility] However on delayed imaging, there is vague persistent activity in this region. . This still may be related to activity in the bowel or left kidney but correlation with CT of the abdomen is recommended for further assessment. 23X     NM RADIONUCLIDE ABLATION THERAPY    Result Date: 7/29/2022  EXAM: NM RADIONUCLIDE ABLATION THERAPY INDICATION: Thyroid cancer. COMPARISON: None. CORRELATIVE IMAGING STUDIES: None TRACER: Iodine 131. PROCEDURE: Radioactive iodine treatment was performed following explanation of risks and benefits of the procedure. Postprocedural precautions were discussed with the patient and provided in writing. The patient's questions were answered. Treatment was accomplished with oral administration of 101.6 mCi Iodine 131. There was no immediate complication. The patient was asked to follow up with the ordering physician. I-131 treatment performed. The patient is scheduled for whole body imaging to be performed next week. MRI ABD W WO CONT    Result Date: 9/19/2022  EXAM:  MRI ABD W WO CONT INDICATION: Liver lesion.  COMPARISON: CT performed on 8/22/2022 and 5/29/2009 TECHNIQUE: Coronal T2 and postcontrast T1; Axial T2, in/out of phase, MRCP, pre- and dynamic postcontrast T1 MRI of the abdomen. 20 mL of ProHance. Subtractions were performed. FINDINGS: Liver: Segment 4A lesion demonstrates T2 hyperintensity. Nodular discontinuous peripheral enhancement on arterial phase with centripetal filling on delayed images. Biliary tree: Gallbladder is unremarkable. No biliary dilatation. Pancreas: Unremarkable Spleen: Unremarkable Adrenal glands: Unremarkable Kidneys: Bilateral simple renal cysts. No further follow-up is indicated Vasculature: Unremarkable Bowel: Unremarkable Lymph nodes: No lymphadenopathy is identified. Miscellaneous: None     1. Lesion segment 4A of the liver demonstrates nodular peripheral discontinuous enhancement with centripetal filling and T2 hyperintensity most consistent with benign a hemangioma. Review of a prior study from 2009 which was performed without contrast demonstrates a possible lesion in this region which was poorly visualized and incompletely characterized but without significant change further supporting a diagnosis of hemangioma. 2.  Other incidental findings as above     CT ABD W WO CONT    Result Date: 8/22/2022  EXAM: CT ABD W WO CONT INDICATION: Evaluate liver lesion. COMPARISON: CT thorax 11/26/2021. IV CONTRAST: 100 mL of Isovue-370. ORAL CONTRAST: None. TECHNIQUE:  Multislice helical CT was performed from the diaphragm to the iliac crest prior to and during rapid bolus intravenous contrast administration. Contiguous 5 mm axial images were reconstructed and lung and soft tissue windows were generated. Coronal and sagittal reformations were generated. CT dose reduction was achieved through use of a standardized protocol tailored for this examination and automatic exposure control for dose modulation. FINDINGS: LOWER THORAX: Visualized lung bases are clear. The visualized heart is normal in size without pericardial effusion. Coronary artery calcifications are noted. LIVER: There is a 1.9 x 1.9 cm segment 4A arterial phase hyperenhancing lesion which shows very subtle portal venous hyperenhancement and is isoenhancing to liver on equilibrium phase and there is slightly hypodense on unenhanced CT. A peripheral wedge-shaped transient arterial phase hyperenhancing lesion is seen posteriorly at segment 7 of the dome. No other focal lesions with normal enhancement of hepatic vascular structures and normal morphology. BILIARY TREE: Gallbladder is within normal limits. CBD is not dilated. SPLEEN: within normal limits. PANCREAS: No mass or ductal dilatation. ADRENALS: Unremarkable. KIDNEYS: 3.2 cm exophytic cyst at the lateral upper pole of the left kidney for which no follow-up is required. No calculus, hydronephrosis, or other focal renal abnormalities. STOMACH: Small hiatal hernia. Otherwise unremarkable. VISUALIZED BOWEL: There is concern for polypoid hypodensity within central small bowel measuring 10 x 9 and 8 x 9 mm which show persistent hyperdense on postcontrast phases with progressive confluent hypodensity seen on delayed imaging which is not evident on precontrast imaging. No dilatation or wall thickening. APPENDIX: Unremarkable in visualized extent with tip below the field of view. PERITONEUM: No ascites or pneumoperitoneum. RETROPERITONEUM: Atherosclerotic calcination without aneurysm or dissection. No enlarged lymphadenopathy. BONES: Degenerative spine change with left lateral plate and screw hardware transfixing L5 2-3 with intervertebral disc prostheses. No acute fracture or aggressive lesion. ABDOMINAL WALL: No mass or hernia.  ADDITIONAL COMMENTS: N/A     Hyperenhancing segment 4A lesion within liver show CT characteristics most compatible with benign focal nodular hyperplasia, however in the setting of possible polypoid small bowel hyperenhancing masses versus radiodense enteric contents concern is raised for possible neoplasm such as carcinoid tumor and further characterization with dedicated liver MRI should be considered.  23X         MEDICATIONS     ALL MEDICATIONS:   Current Facility-Administered Medications   Medication Dose Route Frequency    ascorbic acid (vitamin C) (VITAMIN C) tablet 500 mg  500 mg Oral DAILY    pantoprazole (PROTONIX) tablet 40 mg  40 mg Oral QHS    atorvastatin (LIPITOR) tablet 20 mg  20 mg Oral QHS    ARIPiprazole (ABILIFY) tablet 15 mg  15 mg Oral DAILY    nicotine (NICODERM CQ) 14 mg/24 hr patch 1 Patch  1 Patch TransDERmal DAILY PRN    OLANZapine (ZyPREXA) tablet 5 mg  5 mg Oral Q6H PRN    haloperidol lactate (HALDOL) injection 5 mg  5 mg IntraMUSCular Q6H PRN    benztropine (COGENTIN) tablet 1 mg  1 mg Oral BID PRN    diphenhydrAMINE (BENADRYL) injection 50 mg  50 mg IntraMUSCular BID PRN    hydrOXYzine HCL (ATARAX) tablet 50 mg  50 mg Oral TID PRN    traZODone (DESYREL) tablet 50 mg  50 mg Oral QHS PRN    acetaminophen (TYLENOL) tablet 650 mg  650 mg Oral Q4H PRN    magnesium hydroxide (MILK OF MAGNESIA) 400 mg/5 mL oral suspension 30 mL  30 mL Oral DAILY PRN    ibuprofen (MOTRIN) tablet 400 mg  400 mg Oral Q8H PRN    nicotine buccal (POLACRILEX) lozenge 2 mg  2 mg Oral Q2H PRN    midazolam (VERSED) injection 2 mg  2 mg IntraMUSCular Q4H PRN    hydrocortisone (CORTEF) tablet 20 mg  20 mg Oral ACB    levothyroxine (SYNTHROID) tablet 175 mcg  175 mcg Oral ACB      SCHEDULED MEDICATIONS:   Current Facility-Administered Medications   Medication Dose Route Frequency    ascorbic acid (vitamin C) (VITAMIN C) tablet 500 mg  500 mg Oral DAILY    pantoprazole (PROTONIX) tablet 40 mg  40 mg Oral QHS    atorvastatin (LIPITOR) tablet 20 mg  20 mg Oral QHS    ARIPiprazole (ABILIFY) tablet 15 mg  15 mg Oral DAILY    hydrocortisone (CORTEF) tablet 20 mg  20 mg Oral ACB    levothyroxine (SYNTHROID) tablet 175 mcg  175 mcg Oral ACB          ASSESSMENT & PLAN     DIAGNOSES REQUIRING ACTIVE TREATMENT AND MONITORING: (reviewed/updated 10/3/2022)  Patient Active Hospital Problem List:      Psychosis(HCC) (9/30/2022)           Assessment: patient presents with paranoid delusions in setting of external stressors. He has little insight into his condition and has been unable to reality test. Will treat symptomatically. Plan:   - INCREASE Abilify to 15 mg QDAY for psychosis  - IGM therapy as tolerated  - Collateral (daughter Cathie Pearce [phone])  - Dispo planning (home when stable) with social work for the next few days    In summary, Alexis Horn., is a 65 y.o.  male who presents with a severe exacerbation of the principal diagnosis of Delusional disorder ([address]    Patient's condition is improving. Patient requires continued inpatient hospitalization for further stabilization, safety monitoring and medication management. I will continue to coordinate the provision of individual, milieu, occupational, group, and substance abuse therapies to address target symptoms/diagnoses as deemed appropriate for the individual patient. A coordinated, multidisplinary treatment team round was conducted with the patient (this team consists of the nurse, psychiatric unit pharmacist,  and writer). Complete current electronic health record for patient has been reviewed today including consultant notes, ancillary staff notes, nurses and psychiatric tech notes. Suicide risk assessment completed and patient deemed to be of low risk for suicide at this time. The following regarding medications was addressed during rounds with patient:   the risks and benefits of the proposed medication. The patient was given the opportunity to ask questions. Informed consent given to the use of the above medications. Will continue to adjust psychiatric and non-psychiatric medications (see above \"medication\" section and orders section for details) as deemed appropriate & based upon diagnoses and response to treatment.      I will continue to order blood tests/labs and diagnostic tests as deemed appropriate and review results as they become available (see orders for details and above listed lab/test results). I will order psychiatric records from previous [facility] hospitals to further elucidate the nature of patient's psychopathology and review once available. I will gather additional collateral information from friends, family and o/p treatment team to further elucidate the nature of patient's psychopathology and baselline level of psychiatric functioning. I certify that this patient's inpatient psychiatric hospital services furnished since the previous certification were, and continue to be, required for treatment that could reasonably be expected to improve the patient's condition, or for diagnostic study, and that the patient continues to need, on a daily basis, active treatment furnished directly by or requiring the supervision of inpatient psychiatric facility personnel. In addition, the hospital records show that services furnished were intensive treatment services, admission or related services, or equivalent services.     EXPECTED DISCHARGE DATE/DAY: TBD     DISPOSITION: Home       Signed By:   Susan Galvez MD  10/3/2022

## 2022-10-03 NOTE — GROUP NOTE
[facility]  GROUP DOCUMENTATION INDIVIDUAL                                                                          Group Therapy Note    Date: 10/3/2022    Group Start Time: 1400  Group End Time: 1500  Group Topic: Recreational/Music Therapy    [address] [facility] 3 ACUTE BEHAV [facility]    [place], [address] [facility] GROUP DOCUMENTATION GROUP    Group Therapy Note    Attendees: 5       Attendance: Attended    Patient's Goal:  To concentrate on selected task    Interventions/techniques: Supported-crafts,games,music    Interactions: Interacted appropriately    Mental Status: Calm    Behavior/appearance: Needed prompting    Goals Achieved: Able to engage in interactions and Able to listen to others-pt elected to watch peers participate in game      Fernandez Pearce

## 2022-10-03 NOTE — BH NOTES
Pt is calm and cooperative. Visible on the unit. Accepting meals and medications. Pt denies si/hi/a/v singletary. Denies anxiety and depression. Pt requested and received nicotine lozenges. Will continue to monitor pt.

## 2022-10-03 NOTE — BH NOTES
PSYCHOSOCIAL ASSESSMENT  :Patient identifying info:   Nunu Alexander is a 65 y.o., male admitted 9/30/2022  9:45 AM     Presenting problem and precipitating factors: Patient presented to the ED under [facility] for threatening behavior to shoot wife due to fear that she may be cheating on him. Patient had guns and knives laid out in bedroom awaiting wife to come home. Patient's daughter came home and called the police. Per family, patient had been making delusional and bizarre statements for the last month, such as claming he is seeing bugs, when family members report that there are no bugs. Family members reported patient would stay up for days, which patients daughter reported this is abnormal behavior. Patients daughter reported concern of patient using drugs due to recent friend getting out of [facility] for meth and is positive for amphetamines. Mental status assessment: Patient presented with a calm and cooperative demeanor and flat affect. Patient's thought process and speech were linear and organized. Per patient's daughter, patient presents very organized and then disorganized on a moment by moment basis. Strengths/Recreation/Coping Skills: family support, employment, housing     Collateral information: Patient provided verbal permission to speak with wife ([phone]) and daughter, [facility] ([phone]). SW spoke with daughter, [facility] on 10/3/2022. Current psychiatric /substance abuse providers and contact info: No current psychiatric providers      Previous psychiatric/substance abuse providers and response to treatment: No pervious psychiatric providers.     Family history of mental illness or substance abuse: None reported    Substance abuse history:    Social History     Tobacco Use    Smoking status: Every Day     Packs/day: 1.00     Years: 40.00     Pack years: 40.00     Types: Cigarettes    Smokeless tobacco: Never    Tobacco comments:     LOOKING INTO CHANTIX   Substance Use Topics    Alcohol use: No     Comment: RARELY     PATIENT IS POSITIVE FOR AMPHETAMINES     History of biomedical complications associated with substance abuse: None reported     Patient's current acceptance of treatment or motivation for change: Patient reported that he wants to go home, patient appears unwilling or unmotivated for change. Patient appears unaware of current dullusional condition     Family constellation:     Is significant other involved? No    Describe support system: wife, daughters.  Patient reported that 'if my wife isn't cheating on me then I want to go home and go to couple's counseling.'    Describe living arrangements and home environment: Patient lives with wife, daughter, and 11 month of grandchild     GUARDIAN/POA: NO    Guardian Name:     Guardian Contact:     Health issues:   Hospital Problems  Date Reviewed: 10/15/2021            Codes Class Noted POA    * (Principal) [facility] ICD-10-CM: F22  ICD-9-CM: 297.1  10/1/2022 Unknown           Trauma history: None reported    Legal issues: None reported     History of  service: None reported     Financial status: Income through private bushiness as an       Judaism/cultural factors: None reported    Education/work history:      Have you been licensed as a health care professional (current or ): No    Describe coping skills:limited, ineffective    BERNICE Romano   10/3/2022

## 2022-10-03 NOTE — GROUP NOTE
BROOK  GROUP DOCUMENTATION INDIVIDUAL                                                                          Group Therapy Note    Date: 10/3/2022    Group Start Time: 1000  Group End Time: 1100  Group Topic: Topic Group    [facility] [place] [address] ACUTE BEHAV [facility]    [place], [address] [facility] GROUP DOCUMENTATION GROUP    Group Therapy Note    Attendees: 7       Attendance: Attended    Patient's Goal:  To participate in relaxation activity    Interventions/techniques: Supported-game    Follows Directions:  Followed directions    Interactions: Interacted appropriately    Mental Status: Calm    Behavior/appearance: Attentive, Cooperative, and Needed prompting    Goals Achieved: Able to engage in interactions, Able to listen to others, and Discussed coping-active participant in game      Meryle Mcbride

## 2022-10-03 NOTE — BH NOTES
PSYCHIATRIC PROGRESS NOTE         Patient Name  Mickie Rodriguez. Date of Birth 1965   [facility] [id number]   Medical Record Number  [id number]      Age  65 y.o. PCP None   Admit date:  9/30/2022    Room Number  324/01  @ [facility]   Date of Service  10/2/2022         E & M PROGRESS NOTE:         HISTORY       CC:  \"psychosis\"  HISTORY OF PRESENT ILLNESS/INTERVAL HISTORY:  (reviewed/updated 10/2/2022). per initial evaluation: This is a 43-year-old  male with no prior psychiatric history, he works in pesticides and is an , who has [place]'s disease, had a thyroidectomy recently and low testosterone, comes to the hospital voluntary because his daughter who works in the skilled nursing system found him in his room with his guns laid on his bed and suspected something was wrong as he was paranoid and not in his right mind. Apparently, the patient had made statements that his wife had written on his guns like initials of a man that he believes she is cheating on him because of her vindictive and evil nature. They have been having relationship stress and problems for the last year and a half with no sexual contact. In the last 6 months, she has been more distant, working late at night and at job has a new boss and went away for training this past week. She has never been loving and touch or in words. When he talked to her, he heard a male in the background and she was cursing about it under her breath and when he texted her, she changed her tone. He found some other evidence around the house like an object that was like a red laminated piece of device or paper that he found in her car, he was cleaning up around the house. He thought it was his daughter's, tried to give it to them and they said it was not theirs. When he showed it to his wife, she got flustered and asked where he got it from versus saying it was not hers or it was hers.   On that, he has noted scrapings that when under a certain lighting, he can see statements like \"I like the way you fuck me and make me turn on,\" and other statements like \"I hate you very much and different writings all over it. He has kept that as evidence, but he says bring it forth the mess part of it up, but the other parts that are legible still have information about writing this in her handwriting and he also found a key in the drawer that was not on the regular key rings that she noted for her work and for the house. It was not his daughters, it was not his and has scrapings on it as well showing a room number and she said yes, a new key for office that she does not need to go into, it is like a friend's office and he as an  has worked in her building and knows a lot of the rooms and it seems that she was telling fibs about that so he has suspicions based on these things that something is a lie and she initially started showing up everywhere around the house and on his personal items and this upset him to think is this happening and he is like \"am I losing my mind. \"  He tried to show his daughters, they could not see it or did not want to see it and so when he was checking his room, he noticed some changes in the ways some of his personal items were arranged. He looked at them and noticed there were some writings on them, and the initials of this individual that he believes his wife is cheating on him with and one of the initials on his guns and when he noticed that, he said \"well, let me check the others\" and he went through his first locked box and found his guns racks were found with their initials all over those. He was trying to clean them and daughter came home and saw the guns out on his bed and did not understand what he was doing. As suspected, he might be losing his mind, called the police on him and he came voluntarily to talk to somebody.     10/01 - the patient has been visible, oddly related and with ongoing difficulty with reality testing. He has been experiencing VH including seeing words written on objects including a pair of jeans, though this is not apparent when he goes to show MD. The patient is discharge focused, with ongoing PI toward his family and his wife in particular. He acknowledges that his thoughts may represent delusions and is amenable to starting an antipsychotic. 10/02 - the patient has been visible, cooperative and with no instances of agitation or aggression. He slept 8.25 hours overnight and is medication compliant. Patient notably states that he is no longer experiencing VH of words appearing on his jeans. He is discharge focused and thus far tolerating the medication. Patient inquires about if his thyroid or steroid medication can be causing his symptoms. Education provided. Patient c/o back pain, headache, and inquires about restarting his statin and GERD medication. SIDE EFFECTS: (reviewed/updated 10/2/2022)  None reported or admitted to. ALLERGIES:(reviewed/updated 10/2/2022)  No Known Allergies   REVIEW OF SYSTEMS: (reviewed/updated 10/2/2022)  Appetite:no change from normal   Sleep: no change   All other Review of Systems: Negative except per HPI         [address] (MSE):    MSE FINDINGS ARE WITHIN NORMAL LIMITS (WNL) UNLESS OTHERWISE STATED BELOW. ( ALL OF THE BELOW CATEGORIES OF THE MSE HAVE BEEN REVIEWED (reviewed 10/2/2022) AND UPDATED AS DEEMED APPROPRIATE )  General Presentation age appropriate, cooperative   Orientation not oriented to situation   Vital Signs  See below (reviewed 10/2/2022); Vital Signs (BP, Pulse, & Temp) are within normal limits if not listed below.    Gait and Station Stable/steady, no ataxia   Musculoskeletal System No extrapyramidal symptoms (EPS); no abnormal muscular movements or Tardive Dyskinesia (TD); muscle strength and tone are within normal limits   Language No aphasia or dysarthria   Speech:  normal volume and non-pressured   Thought Processes illogical; normal rate of thoughts; fair abstract reasoning/computation   Thought Associations goal directed   Thought Content paranoid delusions and visual hallucinations   Suicidal Ideations none   Homicidal Ideations none   Mood:  anxious    Affect:  constricted   Memory recent  intact   Memory remote:  intact   Concentration/Attention:  intact   Fund of Knowledge average   Insight:  poor   Reliability fair   Judgment:  poor          VITALS:     Patient Vitals for the past 24 hrs:   Temp Pulse Resp BP SpO2   10/02/22 0913 97.6 °F (36.4 °C) 86 18 (!) 139/99 99 %       Wt Readings from Last 3 Encounters:   09/29/22 94 kg (207 lb 3.7 oz)   06/10/22 92.1 kg (203 lb)   06/08/22 92.5 kg (203 lb 14.8 oz)     Temp Readings from Last 3 Encounters:   10/02/22 97.6 °F (36.4 °C)   09/30/22 98.4 °F (36.9 °C)   06/11/22 99.2 °F (37.3 °C)     BP Readings from Last 3 Encounters:   10/02/22 (!) 139/99   09/30/22 136/78   06/11/22 127/79     Pulse Readings from Last 3 Encounters:   10/02/22 86   09/30/22 84   06/11/22 89            DATA     LABORATORY DATA:(reviewed/updated 10/2/2022)  No results found for this or any previous visit (from the past 24 hour(s)). No results found for: VALF2, VALAC, VALP, VALPR, DS6, CRBAM, CRBAMP, CARB2, XCRBAM  No results found for: LITHM   RADIOLOGY REPORTS:(reviewed/updated 10/2/2022)  NM THYROID METS [address] [facility] BODY    Result Date: 8/8/2022  EXAM: NM THYROID METS  BODY INDICATION: Thyroid cancer. COMPARISON: None. CORRELATIVE IMAGING STUDIES: None TRACER: Iodine 131. PROCEDURE: Whole body I-131 scan was performed one week posttreatment with second delayed image of the abdomen and pelvis. There is residual activity in the thyroid bed. On the posterior image, there is a small focus of increased activity in the left upper quadrant which persist on delayed imaging     1. There is residual activity in the thyroid bed.  2. On the posterior image, there is a small focus of activity in the left upper quadrant which could be related to bowel or the left kidney. Harmony Rees However on delayed imaging, there is vague persistent activity in this region. . This still may be related to activity in the bowel or left kidney but correlation with CT of the abdomen is recommended for further assessment. 23X     NM RADIONUCLIDE ABLATION THERAPY    Result Date: 7/29/2022  EXAM: NM RADIONUCLIDE ABLATION THERAPY INDICATION: Thyroid cancer. COMPARISON: None. CORRELATIVE IMAGING STUDIES: None TRACER: Iodine 131. PROCEDURE: Radioactive iodine treatment was performed following explanation of risks and benefits of the procedure. Postprocedural precautions were discussed with the patient and provided in writing. The patient's questions were answered. Treatment was accomplished with oral administration of 101.6 mCi Iodine 131. There was no immediate complication. The patient was asked to follow up with the ordering physician. I-131 treatment performed. The patient is scheduled for whole body imaging to be performed next week. MRI ABD W WO CONT    Result Date: 9/19/2022  EXAM:  MRI ABD W WO CONT INDICATION: Liver lesion. COMPARISON: CT performed on 8/22/2022 and 5/29/2009 TECHNIQUE: Coronal T2 and postcontrast T1; Axial T2, in/out of phase, MRCP, pre- and dynamic postcontrast T1 MRI of the abdomen. 20 mL of ProHance. Subtractions were performed. FINDINGS: Liver: Segment 4A lesion demonstrates T2 hyperintensity. Nodular discontinuous peripheral enhancement on arterial phase with centripetal filling on delayed images. Biliary tree: Gallbladder is unremarkable. No biliary dilatation. Pancreas: Unremarkable Spleen: Unremarkable Adrenal glands: Unremarkable Kidneys: Bilateral simple renal cysts. No further follow-up is indicated Vasculature: Unremarkable Bowel: Unremarkable Lymph nodes: No lymphadenopathy is identified. Miscellaneous: None     1.   Lesion segment 4A of the liver demonstrates nodular peripheral discontinuous enhancement with centripetal filling and T2 hyperintensity most consistent with benign a hemangioma. Review of a prior study from 2009 which was performed without contrast demonstrates a possible lesion in this region which was poorly visualized and incompletely characterized but without significant change further supporting a diagnosis of hemangioma. 2.  Other incidental findings as above     CT ABD W WO CONT    Result Date: 8/22/2022  EXAM: CT ABD W WO CONT INDICATION: Evaluate liver lesion. COMPARISON: CT thorax 11/26/2021. IV CONTRAST: 100 mL of Isovue-370. ORAL CONTRAST: None. TECHNIQUE:  Multislice helical CT was performed from the diaphragm to the iliac crest prior to and during rapid bolus intravenous contrast administration. Contiguous 5 mm axial images were reconstructed and lung and soft tissue windows were generated. Coronal and sagittal reformations were generated. CT dose reduction was achieved through use of a standardized protocol tailored for this examination and automatic exposure control for dose modulation. FINDINGS: LOWER THORAX: Visualized lung bases are clear. The visualized heart is normal in size without pericardial effusion. Coronary artery calcifications are noted. LIVER: There is a 1.9 x 1.9 cm segment 4A arterial phase hyperenhancing lesion which shows very subtle portal venous hyperenhancement and is isoenhancing to liver on equilibrium phase and there is slightly hypodense on unenhanced CT. A peripheral wedge-shaped transient arterial phase hyperenhancing lesion is seen posteriorly at segment 7 of the dome. No other focal lesions with normal enhancement of hepatic vascular structures and normal morphology. BILIARY TREE: Gallbladder is within normal limits. CBD is not dilated. SPLEEN: within normal limits. PANCREAS: No mass or ductal dilatation. ADRENALS: Unremarkable.  KIDNEYS: 3.2 cm exophytic cyst at the lateral upper pole of the left kidney for which no follow-up is required. No calculus, hydronephrosis, or other focal renal abnormalities. STOMACH: Small hiatal hernia. Otherwise unremarkable. VISUALIZED BOWEL: There is concern for polypoid hypodensity within central small bowel measuring 10 x 9 and 8 x 9 mm which show persistent hyperdense on postcontrast phases with progressive confluent hypodensity seen on delayed imaging which is not evident on precontrast imaging. No dilatation or wall thickening. APPENDIX: Unremarkable in visualized extent with tip below the field of view. PERITONEUM: No ascites or pneumoperitoneum. RETROPERITONEUM: Atherosclerotic calcination without aneurysm or dissection. No enlarged lymphadenopathy. BONES: Degenerative spine change with left lateral plate and screw hardware transfixing L5 2-3 with intervertebral disc prostheses. No acute fracture or aggressive lesion. ABDOMINAL WALL: No mass or hernia. ADDITIONAL COMMENTS: N/A     Hyperenhancing segment 4A lesion within liver show CT characteristics most compatible with benign focal nodular hyperplasia, however in the setting of possible polypoid small bowel hyperenhancing masses versus radiodense enteric contents concern is raised for possible neoplasm such as carcinoid tumor and further characterization with dedicated liver MRI should be considered.  23X         MEDICATIONS     ALL MEDICATIONS:   Current Facility-Administered Medications   Medication Dose Route Frequency    nicotine (NICODERM CQ) 14 mg/24 hr patch 1 Patch  1 Patch TransDERmal DAILY PRN    ARIPiprazole (ABILIFY) tablet 10 mg  10 mg Oral DAILY    OLANZapine (ZyPREXA) tablet 5 mg  5 mg Oral Q6H PRN    haloperidol lactate (HALDOL) injection 5 mg  5 mg IntraMUSCular Q6H PRN    benztropine (COGENTIN) tablet 1 mg  1 mg Oral BID PRN    diphenhydrAMINE (BENADRYL) injection 50 mg  50 mg IntraMUSCular BID PRN    hydrOXYzine HCL (ATARAX) tablet 50 mg  50 mg Oral TID PRN    traZODone (DESYREL) tablet 50 mg  50 mg Oral QHS PRN    acetaminophen (TYLENOL) tablet 650 mg  650 mg Oral Q4H PRN    magnesium hydroxide (MILK OF MAGNESIA) 400 mg/5 mL oral suspension 30 mL  30 mL Oral DAILY PRN    ibuprofen (MOTRIN) tablet 400 mg  400 mg Oral Q8H PRN    nicotine buccal (POLACRILEX) lozenge 2 mg  2 mg Oral Q2H PRN    midazolam (VERSED) injection 2 mg  2 mg IntraMUSCular Q4H PRN    hydrocortisone (CORTEF) tablet 20 mg  20 mg Oral ACB    levothyroxine (SYNTHROID) tablet 175 mcg  175 mcg Oral ACB      SCHEDULED MEDICATIONS:   Current Facility-Administered Medications   Medication Dose Route Frequency    ARIPiprazole (ABILIFY) tablet 10 mg  10 mg Oral DAILY    hydrocortisone (CORTEF) tablet 20 mg  20 mg Oral ACB    levothyroxine (SYNTHROID) tablet 175 mcg  175 mcg Oral ACB          ASSESSMENT & PLAN     DIAGNOSES REQUIRING ACTIVE TREATMENT AND MONITORING: (reviewed/updated 10/2/2022)  Patient Active Hospital Problem List:      Psychosis(HCC) (9/30/2022)           Assessment: patient presents with paranoid delusions in setting of external stressors. He has little insight into his condition and has been unable to reality test. Will treat symptomatically. Plan:   - INCREASE Abilify to 15 mg QDAY for psychosis  - IGM therapy as tolerated  - Collateral (daughter Mehdi Ohara [phone])  - Dispo planning (home when stable)    In summary, Mesha Todd, is a 65 y.o.  male who presents with a severe exacerbation of the principal diagnosis of Delusional disorder ([address]    Patient's condition is improving. Patient requires continued inpatient hospitalization for further stabilization, safety monitoring and medication management. I will continue to coordinate the provision of individual, milieu, occupational, group, and substance abuse therapies to address target symptoms/diagnoses as deemed appropriate for the individual patient.   A coordinated, multidisplinary treatment team round was conducted with the patient (this team consists of the nurse, psychiatric unit pharmacist,  and writer). Complete current electronic health record for patient has been reviewed today including consultant notes, ancillary staff notes, nurses and psychiatric tech notes. Suicide risk assessment completed and patient deemed to be of low risk for suicide at this time. The following regarding medications was addressed during rounds with patient:   the risks and benefits of the proposed medication. The patient was given the opportunity to ask questions. Informed consent given to the use of the above medications. Will continue to adjust psychiatric and non-psychiatric medications (see above \"medication\" section and orders section for details) as deemed appropriate & based upon diagnoses and response to treatment. I will continue to order blood tests/labs and diagnostic tests as deemed appropriate and review results as they become available (see orders for details and above listed lab/test results). I will order psychiatric records from previous [facility] hospitals to further elucidate the nature of patient's psychopathology and review once available. I will gather additional collateral information from friends, family and o/p treatment team to further elucidate the nature of patient's psychopathology and baselline level of psychiatric functioning. I certify that this patient's inpatient psychiatric hospital services furnished since the previous certification were, and continue to be, required for treatment that could reasonably be expected to improve the patient's condition, or for diagnostic study, and that the patient continues to need, on a daily basis, active treatment furnished directly by or requiring the supervision of inpatient psychiatric facility personnel. In addition, the hospital records show that services furnished were intensive treatment services, admission or related services, or equivalent services.     EXPECTED DISCHARGE DATE/DAY: TBD DISPOSITION: Home       Signed By:   Parisa Ac MD  10/2/2022

## 2022-10-03 NOTE — BH NOTES
Assumed care of the patient. Patient spent most time of the evening in the dayroom. He was cooperative with the assessments and was able to make his needs known. Patient denied S.I/H.I, anxiety and depression. He strongly denied, \"Never had all that stuffs\" when asked about A/V/H. Patient was medication and meal compliant. PRN Nicotine lozenge and Trazodone administered for sleep upon request. Patient seemed content after knowing that he is on  Protonix and Lipitor now. Lab work completed. Patient appeared to be sleeping for 8 hours.

## 2022-10-04 PROCEDURE — 74011250637 HC RX REV CODE- 250/637: Performed by: NURSE PRACTITIONER

## 2022-10-04 PROCEDURE — 65220000003 HC RM SEMIPRIVATE PSYCH

## 2022-10-04 PROCEDURE — 74011250637 HC RX REV CODE- 250/637: Performed by: PSYCHIATRY & NEUROLOGY

## 2022-10-04 PROCEDURE — 74011250637 HC RX REV CODE- 250/637

## 2022-10-04 RX ADMIN — PANTOPRAZOLE SODIUM 40 MG: 40 TABLET, DELAYED RELEASE ORAL at 21:19

## 2022-10-04 RX ADMIN — OXYCODONE HYDROCHLORIDE AND ACETAMINOPHEN 500 MG: 500 TABLET ORAL at 08:18

## 2022-10-04 RX ADMIN — ARIPIPRAZOLE 15 MG: 15 TABLET ORAL at 08:18

## 2022-10-04 RX ADMIN — LEVOTHYROXINE SODIUM 175 MCG: 125 TABLET ORAL at 07:01

## 2022-10-04 RX ADMIN — ATORVASTATIN CALCIUM 20 MG: 10 TABLET, FILM COATED ORAL at 21:19

## 2022-10-04 RX ADMIN — TRAZODONE HYDROCHLORIDE 50 MG: 50 TABLET ORAL at 21:19

## 2022-10-04 RX ADMIN — FLUTICASONE PROPIONATE 2 SPRAY: 50 SPRAY, METERED NASAL at 19:42

## 2022-10-04 RX ADMIN — Medication 2 MG: at 19:44

## 2022-10-04 RX ADMIN — HYDROCORTISONE 20 MG: 10 TABLET ORAL at 07:02

## 2022-10-04 NOTE — PROGRESS NOTES
Problem: Altered Thought Process (Adult/Pediatric)  Goal: *STG: Participates in treatment plan  Outcome: Progressing Towards Goal     Shift change report given to Cecelia crawley (oncoming nurse) by Fabio Heart (offgoing nurse). Report included the following information SBAR, Kardex, MAR, and Recent Results. Assumed care of patient. Met patient in room. Calm and cooperative during assessment. Patient denied anxiety, depression, SI, HI and AVH. No complaints of pain. Medication and meal compliant. Patient visible on unit. Patient in dayroom watching TV with peers. No issues noted throughout shift.

## 2022-10-04 NOTE — BH NOTES
PSYCHIATRIC PROGRESS NOTE         Patient Name  Melita Duane. Date of Birth 1965   [facility] [id number]   Medical Record Number  [id number]      Age  65 y.o. PCP None   Admit date:  9/30/2022    Room Number  324/01  @ [facility]   Date of Service  10/4/2022         E & M PROGRESS NOTE:         HISTORY       CC:  \"psychosis\"  HISTORY OF PRESENT ILLNESS/INTERVAL HISTORY:  (reviewed/updated 10/4/2022). per initial evaluation: This is a 28-year-old  male with no prior psychiatric history, he works in pesticides and is an , who has Alfonso's disease, had a thyroidectomy recently and low testosterone, comes to the hospital voluntary because his daughter who works in the alf system found him in his room with his guns laid on his bed and suspected something was wrong as he was paranoid and not in his right mind. Apparently, the patient had made statements that his wife had written on his guns like initials of a man that he believes she is cheating on him because of her vindictive and evil nature. They have been having relationship stress and problems for the last year and a half with no sexual contact. In the last 6 months, she has been more distant, working late at night and at job has a new boss and went away for training this past week. She has never been loving and touch or in words. When he talked to her, he heard a male in the background and she was cursing about it under her breath and when he texted her, she changed her tone. He found some other evidence around the house like an object that was like a red laminated piece of device or paper that he found in her car, he was cleaning up around the house. He thought it was his daughter's, tried to give it to them and they said it was not theirs. When he showed it to his wife, she got flustered and asked where he got it from versus saying it was not hers or it was hers.   On that, he has noted scrapings that when under a certain lighting, he can see statements like \"I like the way you fuck me and make me turn on,\" and other statements like \"I hate you very much and different writings all over it. He has kept that as evidence, but he says bring it forth the mess part of it up, but the other parts that are legible still have information about writing this in her handwriting and he also found a key in the drawer that was not on the regular key rings that she noted for her work and for the house. It was not his daughters, it was not his and has scrapings on it as well showing a room number and she said yes, a new key for office that she does not need to go into, it is like a friend's office and he as an  has worked in her building and knows a lot of the rooms and it seems that she was telling fibs about that so he has suspicions based on these things that something is a lie and she initially started showing up everywhere around the house and on his personal items and this upset him to think is this happening and he is like \"am I losing my mind. \"  He tried to show his daughters, they could not see it or did not want to see it and so when he was checking his room, he noticed some changes in the ways some of his personal items were arranged. He looked at them and noticed there were some writings on them, and the initials of this individual that he believes his wife is cheating on him with and one of the initials on his guns and when he noticed that, he said \"well, let me check the others\" and he went through his first locked box and found his guns racks were found with their initials all over those. He was trying to clean them and daughter came home and saw the guns out on his bed and did not understand what he was doing. As suspected, he might be losing his mind, called the police on him and he came voluntarily to talk to somebody.     10/01 - the patient has been visible, oddly related and with ongoing difficulty with reality testing. He has been experiencing VH including seeing words written on objects including a pair of jeans, though this is not apparent when he goes to show MD. The patient is discharge focused, with ongoing PI toward his family and his wife in particular. He acknowledges that his thoughts may represent delusions and is amenable to starting an antipsychotic. 10/02 - the patient has been visible, cooperative and with no instances of agitation or aggression. He slept 8.25 hours overnight and is medication compliant. Patient notably states that he is no longer experiencing VH of words appearing on his jeans. He is discharge focused and thus far tolerating the medication. Patient inquires about if his thyroid or steroid medication can be causing his symptoms. Education provided. Patient c/o back pain, headache, and inquires about restarting his statin and GERD medication. 10/03 - Carlos A Stewart Muniz. reports feeling well and moods are good. Denies SI/HI/AH/VH. No aggression or violence. Has OCD and acknowledges going to far with it. Appropriately interactive and aware. Tolerating medications well. Eating and sleeping fairly. Discharge focused. 10/04 - Carlos A Stewart Muniz. reports feeling well and moods are good. Denies SI/HI/AH/VH. Acknowledged his use of methamphetamines recently as a possible trigger to his seeing signs everywhere. No aggression or violence. Appropriately interactive and aware. Tolerating medications well. Eating and sleeping fairly (7.5 hrs). SIDE EFFECTS: (reviewed/updated 10/4/2022)  None reported or admitted to.      ALLERGIES:(reviewed/updated 10/4/2022)  Allergies   Allergen Reactions    Contrast Dye [Iodine] Hives      REVIEW OF SYSTEMS: (reviewed/updated 10/4/2022)  Appetite:no change from normal   Sleep: no change   All other Review of Systems: Negative except per HPI         [address] [facility] [place] [place]    [facility] [place] FINDINGS ARE WITHIN NORMAL LIMITS (WNL) UNLESS OTHERWISE STATED BELOW. ( ALL OF THE BELOW CATEGORIES OF THE MSE HAVE BEEN REVIEWED (reviewed 10/4/2022) AND UPDATED AS DEEMED APPROPRIATE )  General Presentation age appropriate, cooperative   Orientation not oriented to situation   Vital Signs  See below (reviewed 10/4/2022); Vital Signs (BP, Pulse, & Temp) are within normal limits if not listed below. Gait and Station Stable/steady, no ataxia   Musculoskeletal System No extrapyramidal symptoms (EPS); no abnormal muscular movements or Tardive Dyskinesia (TD); muscle strength and tone are within normal limits   Language No aphasia or dysarthria   Speech:  normal volume and non-pressured   Thought Processes illogical; normal rate of thoughts; fair abstract reasoning/computation   Thought Associations goal directed   Thought Content paranoid delusions and visual hallucinations   Suicidal Ideations none   Homicidal Ideations none   Mood:  anxious    Affect:  constricted   Memory recent  intact   Memory remote:  intact   Concentration/Attention:  intact   Fund of Knowledge average   Insight:  poor   Reliability fair   Judgment:  poor          VITALS:     Patient Vitals for the past 24 hrs:   Temp Pulse Resp BP SpO2   10/04/22 0748 98.2 °F (36.8 °C) 90 16 (!) 140/80 97 %   10/03/22 1955 98.2 °F (36.8 °C) 87 16 (!) 144/85 --       Wt Readings from Last 3 Encounters:   09/29/22 94 kg (207 lb 3.7 oz)   06/10/22 92.1 kg (203 lb)   06/08/22 92.5 kg (203 lb 14.8 oz)     Temp Readings from Last 3 Encounters:   10/04/22 98.2 °F (36.8 °C)   09/30/22 98.4 °F (36.9 °C)   06/11/22 99.2 °F (37.3 °C)     BP Readings from Last 3 Encounters:   10/04/22 (!) 140/80   09/30/22 136/78   06/11/22 127/79     Pulse Readings from Last 3 Encounters:   10/04/22 90   09/30/22 84   06/11/22 89            DATA     LABORATORY DATA:(reviewed/updated 10/4/2022)  No results found for this or any previous visit (from the past 24 hour(s)).     No results found for: VALF2, VALAC, VALP, VALPR, DS6, CRBAM, CRBAMP, CARB2, XCRBAM  No results found for: LITHM   RADIOLOGY REPORTS:(reviewed/updated 10/4/2022)  NM THYROID METS WH BODY    Result Date: 8/8/2022  EXAM: NM THYROID METS WH BODY INDICATION: Thyroid cancer. COMPARISON: None. CORRELATIVE IMAGING STUDIES: None TRACER: Iodine 131. PROCEDURE: Whole body I-131 scan was performed one week posttreatment with second delayed image of the abdomen and pelvis. There is residual activity in the thyroid bed. On the posterior image, there is a small focus of increased activity in the left upper quadrant which persist on delayed imaging     1. There is residual activity in the thyroid bed. 2. On the posterior image, there is a small focus of activity in the left upper quadrant which could be related to bowel or the left kidney. Algis Broaden However on delayed imaging, there is vague persistent activity in this region. . This still may be related to activity in the bowel or left kidney but correlation with CT of the abdomen is recommended for further assessment. 23X     NM RADIONUCLIDE ABLATION THERAPY    Result Date: 7/29/2022  EXAM: NM RADIONUCLIDE ABLATION THERAPY INDICATION: Thyroid cancer. COMPARISON: None. CORRELATIVE IMAGING STUDIES: None TRACER: Iodine 131. PROCEDURE: Radioactive iodine treatment was performed following explanation of risks and benefits of the procedure. Postprocedural precautions were discussed with the patient and provided in writing. The patient's questions were answered. Treatment was accomplished with oral administration of 101.6 mCi Iodine 131. There was no immediate complication. The patient was asked to follow up with the ordering physician. I-131 treatment performed. The patient is scheduled for whole body imaging to be performed next week. MRI ABD W WO CONT    Result Date: 9/19/2022  EXAM:  MRI ABD W WO CONT INDICATION: Liver lesion.  COMPARISON: CT performed on 8/22/2022 and 5/29/2009 TECHNIQUE: Coronal T2 and postcontrast T1; Axial T2, in/out of phase, MRCP, pre- and dynamic postcontrast T1 MRI of the abdomen. 20 mL of ProHance. Subtractions were performed. FINDINGS: Liver: Segment 4A lesion demonstrates T2 hyperintensity. Nodular discontinuous peripheral enhancement on arterial phase with centripetal filling on delayed images. Biliary tree: Gallbladder is unremarkable. No biliary dilatation. Pancreas: Unremarkable Spleen: Unremarkable Adrenal glands: Unremarkable Kidneys: Bilateral simple renal cysts. No further follow-up is indicated Vasculature: Unremarkable Bowel: Unremarkable Lymph nodes: No lymphadenopathy is identified. Miscellaneous: None     1. Lesion segment 4A of the liver demonstrates nodular peripheral discontinuous enhancement with centripetal filling and T2 hyperintensity most consistent with benign a hemangioma. Review of a prior study from 2009 which was performed without contrast demonstrates a possible lesion in this region which was poorly visualized and incompletely characterized but without significant change further supporting a diagnosis of hemangioma. 2.  Other incidental findings as above     CT ABD W WO CONT    Result Date: 8/22/2022  EXAM: CT ABD W WO CONT INDICATION: Evaluate liver lesion. COMPARISON: CT thorax 11/26/2021. IV CONTRAST: 100 mL of Isovue-370. ORAL CONTRAST: None. TECHNIQUE:  Multislice helical CT was performed from the diaphragm to the iliac crest prior to and during rapid bolus intravenous contrast administration. Contiguous 5 mm axial images were reconstructed and lung and soft tissue windows were generated. Coronal and sagittal reformations were generated. CT dose reduction was achieved through use of a standardized protocol tailored for this examination and automatic exposure control for dose modulation. FINDINGS: LOWER THORAX: Visualized lung bases are clear. The visualized heart is normal in size without pericardial effusion.  Coronary artery calcifications are noted. LIVER: There is a 1.9 x 1.9 cm segment 4A arterial phase hyperenhancing lesion which shows very subtle portal venous hyperenhancement and is isoenhancing to liver on equilibrium phase and there is slightly hypodense on unenhanced CT. A peripheral wedge-shaped transient arterial phase hyperenhancing lesion is seen posteriorly at segment 7 of the dome. No other focal lesions with normal enhancement of hepatic vascular structures and normal morphology. BILIARY TREE: Gallbladder is within normal limits. CBD is not dilated. SPLEEN: within normal limits. PANCREAS: No mass or ductal dilatation. ADRENALS: Unremarkable. KIDNEYS: 3.2 cm exophytic cyst at the lateral upper pole of the left kidney for which no follow-up is required. No calculus, hydronephrosis, or other focal renal abnormalities. STOMACH: Small hiatal hernia. Otherwise unremarkable. VISUALIZED BOWEL: There is concern for polypoid hypodensity within central small bowel measuring 10 x 9 and 8 x 9 mm which show persistent hyperdense on postcontrast phases with progressive confluent hypodensity seen on delayed imaging which is not evident on precontrast imaging. No dilatation or wall thickening. APPENDIX: Unremarkable in visualized extent with tip below the field of view. PERITONEUM: No ascites or pneumoperitoneum. RETROPERITONEUM: Atherosclerotic calcination without aneurysm or dissection. No enlarged lymphadenopathy. BONES: Degenerative spine change with left lateral plate and screw hardware transfixing L5 2-3 with intervertebral disc prostheses. No acute fracture or aggressive lesion. ABDOMINAL WALL: No mass or hernia.  ADDITIONAL COMMENTS: N/A     Hyperenhancing segment 4A lesion within liver show CT characteristics most compatible with benign focal nodular hyperplasia, however in the setting of possible polypoid small bowel hyperenhancing masses versus radiodense enteric contents concern is raised for possible neoplasm such as carcinoid tumor and further characterization with dedicated liver MRI should be considered.  23X         MEDICATIONS     ALL MEDICATIONS:   Current Facility-Administered Medications   Medication Dose Route Frequency    fluticasone propionate (FLONASE) 50 mcg/actuation nasal spray 2 Spray  2 Spray Both Nostrils BID PRN    ascorbic acid (vitamin C) (VITAMIN C) tablet 500 mg  500 mg Oral DAILY    pantoprazole (PROTONIX) tablet 40 mg  40 mg Oral QHS    atorvastatin (LIPITOR) tablet 20 mg  20 mg Oral QHS    ARIPiprazole (ABILIFY) tablet 15 mg  15 mg Oral DAILY    nicotine (NICODERM CQ) 14 mg/24 hr patch 1 Patch  1 Patch TransDERmal DAILY PRN    OLANZapine (ZyPREXA) tablet 5 mg  5 mg Oral Q6H PRN    haloperidol lactate (HALDOL) injection 5 mg  5 mg IntraMUSCular Q6H PRN    benztropine (COGENTIN) tablet 1 mg  1 mg Oral BID PRN    diphenhydrAMINE (BENADRYL) injection 50 mg  50 mg IntraMUSCular BID PRN    hydrOXYzine HCL (ATARAX) tablet 50 mg  50 mg Oral TID PRN    traZODone (DESYREL) tablet 50 mg  50 mg Oral QHS PRN    acetaminophen (TYLENOL) tablet 650 mg  650 mg Oral Q4H PRN    magnesium hydroxide (MILK OF MAGNESIA) 400 mg/5 mL oral suspension 30 mL  30 mL Oral DAILY PRN    ibuprofen (MOTRIN) tablet 400 mg  400 mg Oral Q8H PRN    nicotine buccal (POLACRILEX) lozenge 2 mg  2 mg Oral Q2H PRN    midazolam (VERSED) injection 2 mg  2 mg IntraMUSCular Q4H PRN    hydrocortisone (CORTEF) tablet 20 mg  20 mg Oral ACB    levothyroxine (SYNTHROID) tablet 175 mcg  175 mcg Oral ACB      SCHEDULED MEDICATIONS:   Current Facility-Administered Medications   Medication Dose Route Frequency    ascorbic acid (vitamin C) (VITAMIN C) tablet 500 mg  500 mg Oral DAILY    pantoprazole (PROTONIX) tablet 40 mg  40 mg Oral QHS    atorvastatin (LIPITOR) tablet 20 mg  20 mg Oral QHS    ARIPiprazole (ABILIFY) tablet 15 mg  15 mg Oral DAILY    hydrocortisone (CORTEF) tablet 20 mg  20 mg Oral ACB    levothyroxine (SYNTHROID) tablet 175 mcg  175 mcg Oral ACB          ASSESSMENT & PLAN     DIAGNOSES REQUIRING ACTIVE TREATMENT AND MONITORING: (reviewed/updated 10/4/2022)  Patient Active Hospital Problem List:      Psychosis(HCC) (9/30/2022)           Assessment: patient presents with paranoid delusions in setting of external stressors. He has little insight into his condition and has been unable to reality test. Will treat symptomatically. Plan:   - Continue Abilify to 15 mg QDAY for psychosis  - Family meeting today  - IGM therapy as tolerated  - Collateral (daughter Alanna Zambrano [phone])  - Dispo planning (home when stable) with social work for the next few days    In summary, Giancarlo Wilder., is a 65 y.o.  male who presents with a severe exacerbation of the principal diagnosis of Delusional disorder ([address]    Patient's condition is improving. Patient requires continued inpatient hospitalization for further stabilization, safety monitoring and medication management. I will continue to coordinate the provision of individual, milieu, occupational, group, and substance abuse therapies to address target symptoms/diagnoses as deemed appropriate for the individual patient. A coordinated, multidisplinary treatment team round was conducted with the patient (this team consists of the nurse, psychiatric unit pharmacist,  and writer). Complete current electronic health record for patient has been reviewed today including consultant notes, ancillary staff notes, nurses and psychiatric tech notes. Suicide risk assessment completed and patient deemed to be of low risk for suicide at this time. The following regarding medications was addressed during rounds with patient:   the risks and benefits of the proposed medication. The patient was given the opportunity to ask questions. Informed consent given to the use of the above medications.  Will continue to adjust psychiatric and non-psychiatric medications (see above \"medication\" section and orders section for details) as deemed appropriate & based upon diagnoses and response to treatment. I will continue to order blood tests/labs and diagnostic tests as deemed appropriate and review results as they become available (see orders for details and above listed lab/test results). I will order psychiatric records from previous [facility] to further elucidate the nature of patient's psychopathology and review once available. I will gather additional collateral information from friends, family and o/p treatment team to further elucidate the nature of patient's psychopathology and baselline level of psychiatric functioning. I certify that this patient's inpatient psychiatric hospital services furnished since the previous certification were, and continue to be, required for treatment that could reasonably be expected to improve the patient's condition, or for diagnostic study, and that the patient continues to need, on a daily basis, active treatment furnished directly by or requiring the supervision of inpatient psychiatric facility personnel. In addition, the hospital records show that services furnished were intensive treatment services, admission or related services, or equivalent services.     EXPECTED DISCHARGE DATE/DAY: TBD     DISPOSITION: Home       Signed By:   Demi Seals MD  10/4/2022

## 2022-10-04 NOTE — BH NOTES
Family Session:    SW met with patient, patient's wife and daughter, Elvis Unger. Patient's wife shared that she has not been cheating and has never cheated in since they have been . Patient's wife reported that patient was cheating on wife many years prior. Elvis Unger identified that she believes patient has underlying insecurities that have recently been exacerbated. SW dicussed effects of meth use in relation to current delusions related to cheating. Patient appears to believe that his wife's 'cheating' is a fact, rather than a concern. Patient explained during family session that he believes his wife is cheating on him because of a disk that has engraved writing on it from wife's current partner. Patient's wife confirmed with patient that she was not cheating on him and it was just a disk that signified [facility] [employer] at work (wife is a COVID nurse). Patient continued to explain that the disk was evidence to prove that she was cheating. Patient reported that his wife and him have not been intimate for over a year. Patient's daughter explained that his wife has not wanted to be physical with patient due to delusional state, such as seeing bugs everywhere, staying up all night, ect. Patient reported that his wife has also been concerned that he is cheating on her. Both parties appear to have concern that one another is cheating. Patient's  wife reported that drug use has to stop in in order to come home. Patient's family reported hx of substance use from patient. Patient agreed to a weekly drug screen administered by family and that he is at risk of losing his family if he tests positive. Patient's daughter explained that over the weekend, patient told his other daughter 'wait until I get my hands on you' because she was the individual that called to [employer] patient.  Patient reported that he 'was just joking.' SW explained how it may have been a joke, but asked for patient to have an understanding why that is concerning from a hospital standpoint. Patient agreed that it does sound concerning. Patient's wife dicussed concerns regarding verbal threats to kill her and her 'new partner' using guns in the home. Patient denied any homicidal threats and stated 'I never said that.' Patient's family was tearful during this time. Patient reported any HI related statements, although the family states otherwise. Patient's family reported safety concerns. Patient's wife reported that she would rather go stay at her sisters house for awhile after patient is discharged. Patients daughter reported concern regarding her daughter. Patient's family reported that guns are out of the house and patient does not have access. Patient is discharge focused and presented with anger when told that he was not getting out the hospital today in order to monitor medications longer and set up follow ups. Patient was reminded that patient was just started on a medication on 10/3 and there are current family concerns preventing discharge today. Patient was unable to display insight regarding the importance of staying inpatient for a couple more days and unable to address the needs/safety concerns that his family spoke to him about.      SW will discuss discharge with BERNICE Calvo

## 2022-10-04 NOTE — GROUP NOTE
[facility]  GROUP DOCUMENTATION INDIVIDUAL                                                                          Group Therapy Note    Date: 10/4/2022    Group Start Time: 1000  Group End Time: 1100  Group Topic: Topic Group    [facility] [place] [facility] ACUTE BEHAV [facility]    [address] GROUP    Group Therapy Note    Attendees: 8       Attendance: Did not attend      Jasmine Patel

## 2022-10-04 NOTE — BH NOTES
Assumed care of the patient. Patient was visible on the milieu but he kept to himself. Patient was cooperative with the assessments. He denied S.I/H. I/A/V/H, anxiety and depression. He was medication and meal compliant . PRN Trazodone for sleep was administered upon request. Patient was able to express his needs. Patient appeared to be sleeping for about 7.5 hours.

## 2022-10-04 NOTE — PROGRESS NOTES
Problem: Altered Thought Process (Adult/Pediatric)  Goal: *STG: Demonstrates ability to understand and use improved judgment in daily activities and relationships  Outcome: Progressing Towards Goal     Problem: Altered Thought Process (Adult/Pediatric)  Goal: *STG: Remains safe in hospital

## 2022-10-05 PROCEDURE — 74011250637 HC RX REV CODE- 250/637: Performed by: PSYCHIATRY & NEUROLOGY

## 2022-10-05 PROCEDURE — 74011250637 HC RX REV CODE- 250/637: Performed by: NURSE PRACTITIONER

## 2022-10-05 PROCEDURE — 65220000003 HC RM SEMIPRIVATE PSYCH

## 2022-10-05 RX ORDER — HALOPERIDOL 5 MG/1
10 TABLET ORAL
Status: DISCONTINUED | OUTPATIENT
Start: 2022-10-05 | End: 2022-10-06 | Stop reason: HOSPADM

## 2022-10-05 RX ORDER — ARIPIPRAZOLE 15 MG/1
15 TABLET ORAL ONCE
Status: DISCONTINUED | OUTPATIENT
Start: 2022-10-05 | End: 2022-10-05

## 2022-10-05 RX ORDER — HALOPERIDOL DECANOATE 100 MG/ML
100 INJECTION INTRAMUSCULAR
Status: DISCONTINUED | OUTPATIENT
Start: 2022-10-06 | End: 2022-10-06 | Stop reason: HOSPADM

## 2022-10-05 RX ADMIN — OXYCODONE HYDROCHLORIDE AND ACETAMINOPHEN 500 MG: 500 TABLET ORAL at 08:27

## 2022-10-05 RX ADMIN — PANTOPRAZOLE SODIUM 40 MG: 40 TABLET, DELAYED RELEASE ORAL at 21:38

## 2022-10-05 RX ADMIN — LEVOTHYROXINE SODIUM 175 MCG: 125 TABLET ORAL at 06:23

## 2022-10-05 RX ADMIN — HALOPERIDOL 10 MG: 5 TABLET ORAL at 21:38

## 2022-10-05 RX ADMIN — TRAZODONE HYDROCHLORIDE 50 MG: 50 TABLET ORAL at 21:42

## 2022-10-05 RX ADMIN — ARIPIPRAZOLE 15 MG: 15 TABLET ORAL at 08:27

## 2022-10-05 RX ADMIN — FLUTICASONE PROPIONATE 2 SPRAY: 50 SPRAY, METERED NASAL at 22:20

## 2022-10-05 RX ADMIN — FLUTICASONE PROPIONATE 2 SPRAY: 50 SPRAY, METERED NASAL at 11:35

## 2022-10-05 RX ADMIN — ATORVASTATIN CALCIUM 20 MG: 10 TABLET, FILM COATED ORAL at 21:38

## 2022-10-05 RX ADMIN — HYDROCORTISONE 20 MG: 10 TABLET ORAL at 06:23

## 2022-10-05 NOTE — GROUP NOTE
[facility]  GROUP DOCUMENTATION INDIVIDUAL                                                                          Group Therapy Note    Date: 10/5/2022    Group Start Time: 0900  Group End Time: 1000  Group Topic: Topic Group    [address] 3 ACUTE BEHAV [facility]    [address] [facility] GROUP    Group Therapy Note    Attendees: 7       Attendance: Did not attend      Manpreet Choudhary

## 2022-10-05 NOTE — BH NOTES
GROUP THERAPY PROGRESS NOTE    Kelly Schwarz. participated in group.      Group time: 60 minutes    Personal goal for participation: reflection and goal setting    Goal orientation: personal    Group therapy participation: active    Therapeutic interventions reviewed and discussed: yes    Impression of participation: good

## 2022-10-05 NOTE — PROGRESS NOTES
Problem: Altered Thought Process (Adult/Pediatric)  Goal: *STG: Participates in treatment plan  Outcome: Progressing Towards Goal       Shift change report given to Cecelia ASHLEY (oncoming nurse) by Vikas Coley (offgoing nurse). Report included the following information SBAR, Kardex, MAR, and Recent Results. Assumed care of patient. Met patient in [facility]. Compliant with VS and assessment questions. Patient presented with euthymic mood. Patient denied anxiety, depression, SI, HI and AVH. Medication and meal compliant. Patient given PRN Flonase at 1135. No issues noted throughout shift.

## 2022-10-05 NOTE — PROGRESS NOTES
2200:Gama is awake, visible in the milieu, and interactive upon approach. Hygiene is adequate, independent in ADLs. Gait is steady. Denies SI/HI and demonstrates no evidence of intent to harm self or others. Denies hallucinations at present. Does admit \"seeting things written\" PTA. Compliant with scheduled meds. Requested and received flonase, NRT, and trazodone. Pt encouraged to continue to participate in care. Will continue to monitor pt with q 15 min checks and hourly nursing rounds. 0300: Asleep in bed with even respirations. 0600: Pt has been observed throughout the night sleeping in bed with even respirations. Total hours slept approximately  7.50 Hourly nursing rounds maintained. No s/s of distress noted. Will continue to monitor.             Problem: Altered Thought Process (Adult/Pediatric)  Goal: *LTG: Returns to baseline functioning  Outcome: Progressing Towards Goal     Problem: Psychosis  Goal: *STG: Remains safe in hospital  Outcome: Progressing Towards Goal  Goal: *STG/LTG: Complies with medication therapy  Outcome: Progressing Towards Goal

## 2022-10-05 NOTE — BH NOTES
PSYCHIATRIC PROGRESS NOTE         Patient Name  Bassem Rivero. Date of Birth 1965   [facility] [id number]   Medical Record Number  [id number]      Age  65 y.o. PCP None   Admit date:  9/30/2022    Room Number  324/01  @ [facility]   Date of Service  10/5/2022         E & M PROGRESS NOTE:         HISTORY       CC:  \"psychosis\"  HISTORY OF PRESENT ILLNESS/INTERVAL HISTORY:  (reviewed/updated 10/5/2022). per initial evaluation: This is a 60-year-old  male with no prior psychiatric history, he works in pesticides and is an , who has Alfonso's disease, had a thyroidectomy recently and low testosterone, comes to the hospital voluntary because his daughter who works in the [employer] system found him in his room with his guns laid on his bed and suspected something was wrong as he was paranoid and not in his right mind. Apparently, the patient had made statements that his wife had written on his guns like initials of a man that he believes she is cheating on him because of her vindictive and evil nature. They have been having relationship stress and problems for the last year and a half with no sexual contact. In the last 6 months, she has been more distant, working late at night and at job has a new boss and went away for training this past week. She has never been loving and touch or in words. When he talked to her, he heard a male in the background and she was cursing about it under her breath and when he texted her, she changed her tone. He found some other evidence around the house like an object that was like a red laminated piece of device or paper that he found in her car, he was cleaning up around the house. He thought it was his daughter's, tried to give it to them and they said it was not theirs. When he showed it to his wife, she got flustered and asked where he got it from versus saying it was not hers or it was hers.   On that, he has noted scrapings that when under a certain lighting, he can see statements like \"I like the way you fuck me and make me turn on,\" and other statements like \"I hate you very much and different writings all over it. He has kept that as evidence, but he says bring it forth the mess part of it up, but the other parts that are legible still have information about writing this in her handwriting and he also found a key in the drawer that was not on the regular key rings that she noted for her work and for the house. It was not his daughters, it was not his and has scrapings on it as well showing a room number and she said yes, a new key for office that she does not need to go into, it is like a friend's office and he as an  has worked in her building and knows a lot of the rooms and it seems that she was telling fibs about that so he has suspicions based on these things that something is a lie and she initially started showing up everywhere around the house and on his personal items and this upset him to think is this happening and he is like \"am I losing my mind. \"  He tried to show his daughters, they could not see it or did not want to see it and so when he was checking his room, he noticed some changes in the ways some of his personal items were arranged. He looked at them and noticed there were some writings on them, and the initials of this individual that he believes his wife is cheating on him with and one of the initials on his guns and when he noticed that, he said \"well, let me check the others\" and he went through his first locked box and found his guns racks were found with their initials all over those. He was trying to clean them and daughter came home and saw the guns out on his bed and did not understand what he was doing. As suspected, he might be losing his mind, called the police on him and he came voluntarily to talk to somebody.     10/01 - the patient has been visible, oddly related and with ongoing difficulty with reality testing. He has been experiencing VH including seeing words written on objects including a pair of jeans, though this is not apparent when he goes to show MD. The patient is discharge focused, with ongoing PI toward his family and his wife in particular. He acknowledges that his thoughts may represent delusions and is amenable to starting an antipsychotic. 10/02 - the patient has been visible, cooperative and with no instances of agitation or aggression. He slept 8.25 hours overnight and is medication compliant. Patient notably states that he is no longer experiencing VH of words appearing on his jeans. He is discharge focused and thus far tolerating the medication. Patient inquires about if his thyroid or steroid medication can be causing his symptoms. Education provided. Patient c/o back pain, headache, and inquires about restarting his statin and GERD medication. 10/03 - Carlos A Jeronimo. reports feeling well and moods are good. Denies SI/HI/AH/VH. No aggression or violence. Has OCD and acknowledges going to far with it. Appropriately interactive and aware. Tolerating medications well. Eating and sleeping fairly. Discharge focused. 10/04 - Carlos A Jeronimo. reports feeling well and moods are good. Denies SI/HI/AH/VH. Acknowledged his use of methamphetamines recently as a possible trigger to his seeing signs everywhere. No aggression or violence. Appropriately interactive and aware. Tolerating medications well. Eating and sleeping fairly (7.5 hrs). 10/05 - Kayley Hammond. reports feeling well and moods are good. Denies SI/HI/AH/VH. No aggression or violence. Appropriately interactive and aware. Tolerating medications well. Willing to take an Long acting injectable. Eating and sleeping fairly. SIDE EFFECTS: (reviewed/updated 10/5/2022)  None reported or admitted to.      ALLERGIES:(reviewed/updated 10/5/2022)  Allergies   Allergen Reactions Contrast Dye [Iodine] Hives      REVIEW OF SYSTEMS: (reviewed/updated 10/5/2022)  Appetite:no change from normal   Sleep: no change   All other Review of Systems: Negative except per HPI         [facility] (MSE):    MSE FINDINGS ARE WITHIN NORMAL LIMITS (WNL) UNLESS OTHERWISE STATED BELOW. ( ALL OF THE BELOW CATEGORIES OF THE MSE HAVE BEEN REVIEWED (reviewed 10/5/2022) AND UPDATED AS DEEMED APPROPRIATE )  General Presentation age appropriate, cooperative   Orientation not oriented to situation   Vital Signs  See below (reviewed 10/5/2022); Vital Signs (BP, Pulse, & Temp) are within normal limits if not listed below.    Gait and Station Stable/steady, no ataxia   Musculoskeletal System No extrapyramidal symptoms (EPS); no abnormal muscular movements or Tardive Dyskinesia (TD); muscle strength and tone are within normal limits   Language No aphasia or dysarthria   Speech:  normal volume and non-pressured   Thought Processes illogical; normal rate of thoughts; fair abstract reasoning/computation   Thought Associations goal directed   Thought Content paranoid delusions and visual hallucinations   Suicidal Ideations none   Homicidal Ideations none   Mood:  depressed   Affect:  constricted   Memory recent  intact   Memory remote:  intact   Concentration/Attention:  intact   Fund of Knowledge average   Insight:  Fair/limited   Reliability fair   Judgment:  fair          VITALS:     Patient Vitals for the past 24 hrs:   Temp Pulse Resp BP SpO2   10/05/22 0826 98.1 °F (36.7 °C) 96 18 133/80 95 %   10/04/22 2012 98.9 °F (37.2 °C) 91 16 (!) 125/90 100 %       Wt Readings from Last 3 Encounters:   09/29/22 94 kg (207 lb 3.7 oz)   06/10/22 92.1 kg (203 lb)   06/08/22 92.5 kg (203 lb 14.8 oz)     Temp Readings from Last 3 Encounters:   10/05/22 98.1 °F (36.7 °C)   09/30/22 98.4 °F (36.9 °C)   06/11/22 99.2 °F (37.3 °C)     BP Readings from Last 3 Encounters:   10/05/22 133/80   09/30/22 136/78   06/11/22 127/79     Pulse Readings from Last 3 Encounters:   10/05/22 96   09/30/22 84   06/11/22 89            DATA     LABORATORY DATA:(reviewed/updated 10/5/2022)  No results found for this or any previous visit (from the past 24 hour(s)). No results found for: VALF2, VALAC, VALP, VALPR, DS6, CRBAM, CRBAMP, CARB2, XCRBAM  No results found for: LITHM   RADIOLOGY REPORTS:(reviewed/updated 10/5/2022)  NM THYROID METS WH BODY    Result Date: 8/8/2022  EXAM: NM THYROID METS WH BODY INDICATION: Thyroid cancer. COMPARISON: None. CORRELATIVE IMAGING STUDIES: None TRACER: Iodine 131. PROCEDURE: Whole body I-131 scan was performed one week posttreatment with second delayed image of the abdomen and pelvis. There is residual activity in the thyroid bed. On the posterior image, there is a small focus of increased activity in the left upper quadrant which persist on delayed imaging     1. There is residual activity in the thyroid bed. 2. On the posterior image, there is a small focus of activity in the left upper quadrant which could be related to bowel or the left kidney. Rosa Maria Stands However on delayed imaging, there is vague persistent activity in this region. . This still may be related to activity in the bowel or left kidney but correlation with CT of the abdomen is recommended for further assessment. 23X     NM RADIONUCLIDE ABLATION THERAPY    Result Date: 7/29/2022  EXAM: NM RADIONUCLIDE ABLATION THERAPY INDICATION: Thyroid cancer. COMPARISON: None. CORRELATIVE IMAGING STUDIES: None TRACER: Iodine 131. PROCEDURE: Radioactive iodine treatment was performed following explanation of risks and benefits of the procedure. Postprocedural precautions were discussed with the patient and provided in writing. The patient's questions were answered. Treatment was accomplished with oral administration of 101.6 mCi Iodine 131. There was no immediate complication. The patient was asked to follow up with the ordering physician.      I-131 treatment performed. The patient is scheduled for whole body imaging to be performed next week. MRI ABD W WO CONT    Result Date: 9/19/2022  EXAM:  MRI ABD W WO CONT INDICATION: Liver lesion. COMPARISON: CT performed on 8/22/2022 and 5/29/2009 TECHNIQUE: Coronal T2 and postcontrast T1; Axial T2, in/out of phase, MRCP, pre- and dynamic postcontrast T1 MRI of the abdomen. 20 mL of ProHance. Subtractions were performed. FINDINGS: Liver: Segment 4A lesion demonstrates T2 hyperintensity. Nodular discontinuous peripheral enhancement on arterial phase with centripetal filling on delayed images. Biliary tree: Gallbladder is unremarkable. No biliary dilatation. Pancreas: Unremarkable Spleen: Unremarkable Adrenal glands: Unremarkable Kidneys: Bilateral simple renal cysts. No further follow-up is indicated Vasculature: Unremarkable Bowel: Unremarkable Lymph nodes: No lymphadenopathy is identified. Miscellaneous: None     1. Lesion segment 4A of the liver demonstrates nodular peripheral discontinuous enhancement with centripetal filling and T2 hyperintensity most consistent with benign a hemangioma. Review of a prior study from 2009 which was performed without contrast demonstrates a possible lesion in this region which was poorly visualized and incompletely characterized but without significant change further supporting a diagnosis of hemangioma. 2.  Other incidental findings as above     CT ABD W WO CONT    Result Date: 8/22/2022  EXAM: CT ABD W WO CONT INDICATION: Evaluate liver lesion. COMPARISON: CT thorax 11/26/2021. IV CONTRAST: 100 mL of Isovue-370. ORAL CONTRAST: None. TECHNIQUE:  Multislice helical CT was performed from the diaphragm to the iliac crest prior to and during rapid bolus intravenous contrast administration. Contiguous 5 mm axial images were reconstructed and lung and soft tissue windows were generated. Coronal and sagittal reformations were generated.   CT dose reduction was achieved through use of a standardized protocol tailored for this examination and automatic exposure control for dose modulation. FINDINGS: LOWER THORAX: Visualized lung bases are clear. The visualized heart is normal in size without pericardial effusion. Coronary artery calcifications are noted. LIVER: There is a 1.9 x 1.9 cm segment 4A arterial phase hyperenhancing lesion which shows very subtle portal venous hyperenhancement and is isoenhancing to liver on equilibrium phase and there is slightly hypodense on unenhanced CT. A peripheral wedge-shaped transient arterial phase hyperenhancing lesion is seen posteriorly at segment 7 of the dome. No other focal lesions with normal enhancement of hepatic vascular structures and normal morphology. BILIARY TREE: Gallbladder is within normal limits. CBD is not dilated. SPLEEN: within normal limits. PANCREAS: No mass or ductal dilatation. ADRENALS: Unremarkable. KIDNEYS: 3.2 cm exophytic cyst at the lateral upper pole of the left kidney for which no follow-up is required. No calculus, hydronephrosis, or other focal renal abnormalities. STOMACH: Small hiatal hernia. Otherwise unremarkable. VISUALIZED BOWEL: There is concern for polypoid hypodensity within central small bowel measuring 10 x 9 and 8 x 9 mm which show persistent hyperdense on postcontrast phases with progressive confluent hypodensity seen on delayed imaging which is not evident on precontrast imaging. No dilatation or wall thickening. APPENDIX: Unremarkable in visualized extent with tip below the field of view. PERITONEUM: No ascites or pneumoperitoneum. RETROPERITONEUM: Atherosclerotic calcination without aneurysm or dissection. No enlarged lymphadenopathy. BONES: Degenerative spine change with left lateral plate and screw hardware transfixing L5 2-3 with intervertebral disc prostheses. No acute fracture or aggressive lesion. ABDOMINAL WALL: No mass or hernia.  ADDITIONAL COMMENTS: N/A     Hyperenhancing segment 4A lesion within liver show CT characteristics most compatible with benign focal nodular hyperplasia, however in the setting of possible polypoid small bowel hyperenhancing masses versus radiodense enteric contents concern is raised for possible neoplasm such as carcinoid tumor and further characterization with dedicated liver MRI should be considered.  23X         MEDICATIONS     ALL MEDICATIONS:   Current Facility-Administered Medications   Medication Dose Route Frequency    fluticasone propionate (FLONASE) 50 mcg/actuation nasal spray 2 Spray  2 Spray Both Nostrils BID PRN    ascorbic acid (vitamin C) (VITAMIN C) tablet 500 mg  500 mg Oral DAILY    pantoprazole (PROTONIX) tablet 40 mg  40 mg Oral QHS    atorvastatin (LIPITOR) tablet 20 mg  20 mg Oral QHS    ARIPiprazole (ABILIFY) tablet 15 mg  15 mg Oral DAILY    nicotine (NICODERM CQ) 14 mg/24 hr patch 1 Patch  1 Patch TransDERmal DAILY PRN    OLANZapine (ZyPREXA) tablet 5 mg  5 mg Oral Q6H PRN    haloperidol lactate (HALDOL) injection 5 mg  5 mg IntraMUSCular Q6H PRN    benztropine (COGENTIN) tablet 1 mg  1 mg Oral BID PRN    diphenhydrAMINE (BENADRYL) injection 50 mg  50 mg IntraMUSCular BID PRN    hydrOXYzine HCL (ATARAX) tablet 50 mg  50 mg Oral TID PRN    traZODone (DESYREL) tablet 50 mg  50 mg Oral QHS PRN    acetaminophen (TYLENOL) tablet 650 mg  650 mg Oral Q4H PRN    magnesium hydroxide (MILK OF MAGNESIA) 400 mg/5 mL oral suspension 30 mL  30 mL Oral DAILY PRN    ibuprofen (MOTRIN) tablet 400 mg  400 mg Oral Q8H PRN    nicotine buccal (POLACRILEX) lozenge 2 mg  2 mg Oral Q2H PRN    midazolam (VERSED) injection 2 mg  2 mg IntraMUSCular Q4H PRN    hydrocortisone (CORTEF) tablet 20 mg  20 mg Oral ACB    levothyroxine (SYNTHROID) tablet 175 mcg  175 mcg Oral ACB      SCHEDULED MEDICATIONS:   Current Facility-Administered Medications   Medication Dose Route Frequency    ascorbic acid (vitamin C) (VITAMIN C) tablet 500 mg  500 mg Oral DAILY    pantoprazole (PROTONIX) tablet 40 mg 40 mg Oral QHS    atorvastatin (LIPITOR) tablet 20 mg  20 mg Oral QHS    ARIPiprazole (ABILIFY) tablet 15 mg  15 mg Oral DAILY    hydrocortisone (CORTEF) tablet 20 mg  20 mg Oral ACB    levothyroxine (SYNTHROID) tablet 175 mcg  175 mcg Oral ACB          ASSESSMENT & PLAN     DIAGNOSES REQUIRING ACTIVE TREATMENT AND MONITORING: (reviewed/updated 10/5/2022)  Patient Active Hospital Problem List:      Psychosis(HCC) (9/30/2022)           Assessment: patient presents with paranoid delusions in setting of external stressors. He has little insight into his condition and has been unable to reality test. Will treat symptomatically. Plan:   - Change Abilify to haldol for SCRUGGS conversion   - IGM therapy as tolerated  - Collateral ([facility] [phone])  - Dispo planning (home when stable) with social work for the next few days    In summary, Kayley Jay, is a 65 y.o.  male who presents with a severe exacerbation of the principal diagnosis of Delusional disorder ([facility] [address]    Patient's condition is improving. Patient requires continued inpatient hospitalization for further stabilization, safety monitoring and medication management. I will continue to coordinate the provision of individual, milieu, occupational, group, and substance abuse therapies to address target symptoms/diagnoses as deemed appropriate for the individual patient. A coordinated, multidisplinary treatment team round was conducted with the patient (this team consists of the nurse, psychiatric unit pharmacist,  and writer). Complete current electronic health record for patient has been reviewed today including consultant notes, ancillary staff notes, nurses and psychiatric tech notes. Suicide risk assessment completed and patient deemed to be of low risk for suicide at this time. The following regarding medications was addressed during rounds with patient:   the risks and benefits of the proposed medication.  The patient was given the opportunity to ask questions. Informed consent given to the use of the above medications. Will continue to adjust psychiatric and non-psychiatric medications (see above \"medication\" section and orders section for details) as deemed appropriate & based upon diagnoses and response to treatment. I will continue to order blood tests/labs and diagnostic tests as deemed appropriate and review results as they become available (see orders for details and above listed lab/test results). I will order psychiatric records from previous [facility] hospitals to further elucidate the nature of patient's psychopathology and review once available. I will gather additional collateral information from friends, family and o/p treatment team to further elucidate the nature of patient's psychopathology and baselline level of psychiatric functioning. I certify that this patient's inpatient psychiatric hospital services furnished since the previous certification were, and continue to be, required for treatment that could reasonably be expected to improve the patient's condition, or for diagnostic study, and that the patient continues to need, on a daily basis, active treatment furnished directly by or requiring the supervision of inpatient psychiatric facility personnel. In addition, the hospital records show that services furnished were intensive treatment services, admission or related services, or equivalent services.     EXPECTED DISCHARGE DATE/DAY: TBD     DISPOSITION: Home       Signed By:   Octavio Arevalo MD  10/5/2022

## 2022-10-05 NOTE — GROUP NOTE
[facility]  GROUP DOCUMENTATION INDIVIDUAL                                                                          Group Therapy Note    Date: 10/4/2022    Group Start Time: 0830  Group End Time: 0930  Group Topic: Recreational/Music Therapy    [address] [facility] 3 GEN BEHAV [facility]    Nichole Espinosa    IP [address] [facility] GROUP DOCUMENTATION GROUP    Group Therapy Note    Attendees:        Attendance: {[facility] GROUP DOC ATTENDANCE:34916}    Patient's Goal:  ***    Interventions/techniques: {[facility] GROUP DOC INTERVENTIONS/TECHNIQUES:61874}    Follows Directions: {[facility] GROUP DOC FOLLOWS DIRECTION:95829}    Interactions: {[facility] GROUP DOC INTERACTIONS:32305}    Mental Status: {[facility] GROUP DOC MENTAL STATUS:78097}    Behavior/appearance: {[facility] GROUP DOC BEHAVIOR/APPEARANCE:30981}    Goals Achieved: {[facility] GROUP DOC GOALS ACHIEVED:92809}      Additional Notes:  ***    Scott Hernandez

## 2022-10-05 NOTE — GROUP NOTE
[facility]  GROUP DOCUMENTATION INDIVIDUAL                                                                          Group Therapy Note    Date: 10/5/2022    Group Start Time: 1500  Group End Time: 1600  Group Topic: Recreational/Music Therapy    [facility] [place] [address] ACUTE BEHAV [facility]    [address] GROUP    Group Therapy Note    Attendees: 7       Attendance: Did not attend      Ernesto Christie

## 2022-10-06 VITALS
TEMPERATURE: 97.9 F | RESPIRATION RATE: 18 BRPM | OXYGEN SATURATION: 97 % | DIASTOLIC BLOOD PRESSURE: 77 MMHG | SYSTOLIC BLOOD PRESSURE: 108 MMHG | HEART RATE: 86 BPM

## 2022-10-06 PROCEDURE — 74011250637 HC RX REV CODE- 250/637: Performed by: NURSE PRACTITIONER

## 2022-10-06 PROCEDURE — 74011250637 HC RX REV CODE- 250/637: Performed by: PSYCHIATRY & NEUROLOGY

## 2022-10-06 PROCEDURE — 74011250636 HC RX REV CODE- 250/636: Performed by: PSYCHIATRY & NEUROLOGY

## 2022-10-06 RX ORDER — HALOPERIDOL DECANOATE 100 MG/ML
100 INJECTION INTRAMUSCULAR
Status: DISCONTINUED | OUTPATIENT
Start: 2022-10-06 | End: 2022-10-06

## 2022-10-06 RX ORDER — B-COMPLEX WITH VITAMIN C
1 TABLET ORAL DAILY
Qty: 30 TABLET | Refills: 0 | Status: SHIPPED | OUTPATIENT
Start: 2022-10-06

## 2022-10-06 RX ORDER — PHENOL/SODIUM PHENOLATE
20 AEROSOL, SPRAY (ML) MUCOUS MEMBRANE
Qty: 30 TABLET | Refills: 0 | Status: SHIPPED | OUTPATIENT
Start: 2022-10-06

## 2022-10-06 RX ORDER — FLUTICASONE PROPIONATE 50 MCG
SPRAY, SUSPENSION (ML) NASAL
Qty: 1 EACH | Refills: 0 | Status: SHIPPED | OUTPATIENT
Start: 2022-10-06

## 2022-10-06 RX ORDER — SIMVASTATIN 40 MG/1
40 TABLET, FILM COATED ORAL
Qty: 30 TABLET | Refills: 0 | Status: SHIPPED | OUTPATIENT
Start: 2022-10-06

## 2022-10-06 RX ORDER — HALOPERIDOL 10 MG/1
10 TABLET ORAL
Qty: 30 TABLET | Refills: 0 | Status: SHIPPED | OUTPATIENT
Start: 2022-10-06 | End: 2022-10-24

## 2022-10-06 RX ADMIN — LEVOTHYROXINE SODIUM 175 MCG: 125 TABLET ORAL at 06:36

## 2022-10-06 RX ADMIN — OXYCODONE HYDROCHLORIDE AND ACETAMINOPHEN 500 MG: 500 TABLET ORAL at 08:27

## 2022-10-06 RX ADMIN — HALOPERIDOL DECANOATE 100 MG: 100 INJECTION INTRAMUSCULAR at 10:33

## 2022-10-06 RX ADMIN — HYDROCORTISONE 20 MG: 10 TABLET ORAL at 06:36

## 2022-10-06 NOTE — PROGRESS NOTES
Problem: Falls - Risk of  Goal: *Absence of Falls  Description: Document Sofia Fothergill Fall Risk and appropriate interventions in the flowsheet.   Outcome: Resolved/Met     Problem: Patient Education: Go to Patient Education Activity  Goal: Patient/Family Education  Outcome: Resolved/Met     Problem: Altered Thought Process (Adult/Pediatric)  Goal: *STG: Participates in treatment plan  Outcome: Resolved/Met  Goal: *STG: Remains safe in hospital  10/6/2022 0926 by Olya Horvath RN  Outcome: Resolved/Met  10/6/2022 0918 by Olya Horvath RN  Outcome: Progressing Towards Goal  Goal: *STG: Seeks staff when feelings of anxiety and fear arise  Outcome: Resolved/Met  Goal: *STG: Complies with medication therapy  10/6/2022 0926 by Olya Horvath RN  Outcome: Resolved/Met  10/6/2022 0918 by Olya Horvath RN  Outcome: Progressing Towards Goal  Goal: *STG: Attends activities and groups  Outcome: Resolved/Met  Goal: *STG: Decreased delusional thinking  Outcome: Resolved/Met  Goal: *STG: Decreased hallucinations  Outcome: Resolved/Met  Goal: *STG: Absence of lethality  Outcome: Resolved/Met  Goal: *STG: Demonstrates ability to understand and use improved judgment in daily activities and relationships  Outcome: Resolved/Met  Goal: *LTG: Returns to baseline functioning  Outcome: Resolved/Met  Goal: Interventions  Outcome: Resolved/Met     Problem: Patient Education: Go to Patient Education Activity  Goal: Patient/Family Education  Outcome: Resolved/Met     Problem: Psychosis  Goal: *STG: Decreased hallucinations  Outcome: Resolved/Met  Goal: *STG: Decreased delusional thinking  Outcome: Resolved/Met  Goal: *STG: Remains safe in hospital  Outcome: Resolved/Met  Goal: *STG: Seeks staff when feelings of self harm or harm towards others arise  Outcome: Resolved/Met  Goal: *STG: Patient will verbalize areas in need of boundary recognition and limit setting  Outcome: Resolved/Met  Goal: *STG: Patient will develop strategies to regulate emotions and corresponding behaviors  Outcome: Resolved/Met  Goal: *STG: Accept constructive criticism without injury or isolation  Outcome: Resolved/Met  Goal: *STG: Participates in individual and group therapy  Outcome: Resolved/Met  Goal: *STG: Develop safety plan for times when triggered in stressful situations  Outcome: Resolved/Met  Goal: *STG: Patient will verbalize issues that get in their way of progress (i.e.: anger, fear etc.)  Outcome: Resolved/Met  Goal: *STG/LTG: Complies with medication therapy  Outcome: Resolved/Met  Goal: *STG/LTG: Demonstrates improved thought patterns as evidenced by logical and coherent speech  Outcome: Resolved/Met  Goal: *STG/LTG: Demonstrates improved social functioning by responding appropriately to staff  Outcome: Resolved/Met  Goal: Interventions  Outcome: Resolved/Met     Problem: Discharge Planning  Goal: *Discharge to safe environment  Outcome: Resolved/Met  Goal: *Knowledge of medication management  Outcome: Resolved/Met  Goal: *Knowledge of discharge instructions  Outcome: Resolved/Met     Problem: Patient Education: Go to Patient Education Activity  Goal: Patient/Family Education  Outcome: Resolved/Met     Problem: Anxiety  Goal: *Alleviation of anxiety  Outcome: Resolved/Met  Goal: *Alleviation of anxiety (Palliative Care)  Outcome: Resolved/Met     Problem: Patient Education: Go to Patient Education Activity  Goal: Patient/Family Education  Outcome: Resolved/Met     Problem: Discharge Planning  Goal: *Discharge to safe environment  Outcome: Resolved/Met  Goal: *Knowledge of medication management  Outcome: Resolved/Met  Goal: *Knowledge of discharge instructions  Outcome: Resolved/Met     Problem: Patient Education: Go to Patient Education Activity  Goal: Patient/Family Education  Outcome: Resolved/Met

## 2022-10-06 NOTE — PROGRESS NOTES
Report received from [facility]. Patient met in bedroom, awakens to verbal stimuli. Denies SI/HI/AVH, makes no outwardly delusional statements during assessment. Compliant with scheduled medications. Denies any further needs. Will continue to monitor.      Problem: Altered Thought Process (Adult/Pediatric)  Goal: *STG: Remains safe in hospital  Outcome: Progressing Towards Goal  Goal: *STG: Complies with medication therapy  Outcome: Progressing Towards Goal

## 2022-10-06 NOTE — BH NOTES
PSYCHIATRIC DISCHARGE SUMMARY         IDENTIFICATION:    Patient Name  Bridger Black. Date of Birth 1965   [facility] [id number]   Medical Record Number  [id number]      Age  65 y.o.    PCP None   Admit date:  9/30/2022    Discharge date: 10/6/2022   Room Number  324/01  @ [facility]   Date of Service  10/6/2022            TYPE OF DISCHARGE: REGULAR               CONDITION AT DISCHARGE: improved       PROVISIONAL & DISCHARGE DIAGNOSES:    Problem List  Date Reviewed: 10/15/2021            Codes Class    Cough ICD-10-CM: R05.9  ICD-9-CM: 786.2         Blood in stool ICD-10-CM: K92.1  ICD-9-CM: 578.1         Severe anxiety with panic ICD-10-CM: F41.0  ICD-9-CM: 300.01         Low testosterone ICD-10-CM: R79.89  ICD-9-CM: 790.99         [place]'s disease ([facility] ICD-10-CM: E27.1  ICD-9-CM: 255.41         Chronic back pain ICD-10-CM: M54.9, G89.29  ICD-9-CM: 724.5, 338.29         Kidney stones ICD-10-CM: N20.0  ICD-9-CM: 592.0     Overview Signed 8/13/2017  7:47 AM by Xiomara Scott MD     multiple, has had lithotripsey as well as passed on his own             * (Principal) Delusional disorder ([facility].) ICD-10-CM: F22  ICD-9-CM: 297.1         Unspecified psychosis not due to a substance or known physiological condition ([facility] ICD-10-CM: F29  ICD-9-CM: 298.9         Thyroid nodule ICD-10-CM: E04.1  ICD-9-CM: 241.0         Spinal stenosis, lumbar ICD-10-CM: M48.061  ICD-9-CM: 724.02         Chest pain ICD-10-CM: R07.9  ICD-9-CM: 786.50         Rising PSA level ICD-10-CM: R97.20  ICD-9-CM: 790.93         Polycythemia ICD-10-CM: D75.1  ICD-9-CM: 238.4         Cigarette nicotine dependence without complication TTN-18-FU: Y48.805  ICD-9-CM: 305.1         Hypogonadism male ICD-10-CM: E29.1  ICD-9-CM: 257.2         Opioid dependence ([facility] ICD-10-CM: F11.20  ICD-9-CM: 304.00         Chronic insomnia ICD-10-CM: F51.04  ICD-9-CM: 780.52         Hypothyroidism ICD-10-CM: E03.9  ICD-9-CM: 244.9 Vitamin D deficiency ICD-10-CM: E55.9  ICD-9-CM: 268.9         Difficulty in urination ICD-10-CM: R39.198  ICD-9-CM: 788.99         Thumb pain ICD-10-CM: M79.646  ICD-9-CM: 729.5            Active Hospital Problems    *Delusional disorder ([facility]        DISCHARGE DIAGNOSIS:   Axis I:  SEE ABOVE  Axis II: SEE ABOVE  Axis III: SEE ABOVE  Axis IV:  lack of structure  Axis V:  <50 on admission, 55+ on discharge     CC & HISTORY OF PRESENT ILLNESS:  46-year-old  male with no prior psychiatric history, he works in pesticides and is an , who has Catahoula's disease, had a thyroidectomy recently and low testosterone, comes to the hospital voluntary because his daughter who works in the assisted system found him in his room with his guns laid on his bed and suspected something was wrong as he was paranoid and not in his right mind. Apparently, the patient had made statements that his wife had written on his guns like initials of a man that he believes she is cheating on him because of her vindictive and evil nature. They have been having relationship stress and problems for the last year and a half with no sexual contact. In the last 6 months, she has been more distant, working late at night and at job has a new boss and went away for training this past week. She has never been loving and touch or in words. When he talked to her, he heard a male in the background and she was cursing about it under her breath and when he texted her, she changed her tone. He found some other evidence around the house like an object that was like a red laminated piece of device or paper that he found in her car, he was cleaning up around the house. He thought it was his daughter's, tried to give it to them and they said it was not theirs. When he showed it to his wife, she got flustered and asked where he got it from versus saying it was not hers or it was hers.   On that, he has noted scrapings that when under a certain lighting, he can see statements like \"I like the way you fuck me and make me turn on,\" and other statements like \"I hate you very much Gama\" and different writings all over it. He has kept that as evidence, but he says in trying to bring it (the writing) forth, he messed part of it up, but the other parts that are legible still have information about writing this in her handwriting and he also found a key in the drawer that was not on the regular key rings that she noted for her work and for the house. It was not his daughters, it was not his and has scrapings on it as well showing a room number and she said yes, a new key for office that she does not need to go into, it is like a friend's office and he as an  has worked in her building and knows a lot of the rooms and it seems that she was telling fibs about that so he has suspicions based on these things that something is a lie and signs initially started showing up everywhere around the house and on his personal items and this upset him to think is this happening and he is like \"am I losing my mind. \"  He tried to show his daughters, they could not see it or did not want to see it and so when he was checking his room, he noticed some changes in the ways some of his personal items were arranged. He looked at them and noticed there were some writings on them, and the initials of this individual that he believes his wife is cheating on him with and one of the initials on his guns and when he noticed that, he said \"well, let me check the others\" and he went through his first locked box and found his guns racks were found with their initials all over those. He was trying to clean them and daughter came home and saw the guns out on his bed and did not understand what he was doing. As suspected, he might be losing his mind, called the police on him and he came voluntarily to talk to somebody.      SOCIAL HISTORY:    Social History     Socioeconomic History    Marital status:      Spouse name: Not on file    Number of children: 2    Years of education: Not on file    Highest education level: Not on file   Occupational History    Not on file   Tobacco Use    Smoking status: Every Day     Packs/day: 1.00     Years: 40.00     Pack years: 40.00     Types: Cigarettes    Smokeless tobacco: Never    Tobacco comments:     LOOKING INTO CHANTIX   Vaping Use    Vaping Use: Never used   Substance and Sexual Activity    Alcohol use: No     Comment: RARELY    Drug use: No    Sexual activity: Not on file   Other Topics Concern    Not on file   Social History Narrative    Not on file     Social Determinants of Health     Financial Resource Strain: Not on file   Food Insecurity: Not on file   Transportation Needs: Not on file   Physical Activity: Not on file   Stress: Not on file   Social Connections: Not on file   Intimate Partner Violence: Not on file   Housing Stability: Not on file      FAMILY HISTORY:   Family History   Problem Relation Age of Onset    Cancer Mother         breast    High Cholesterol Mother     Stroke Father     Diabetes Father     High Cholesterol Father     Cancer Sister         breast    High Cholesterol Sister     Cancer Sister         breast    High Cholesterol Sister     Cancer Brother         SKIN CANCER    Other Brother         GOUT    High Cholesterol Brother     Heart Attack Paternal Grandmother     Anesth Problems Neg Hx              HOSPITALIZATION COURSE:    Mickie Osorio was admitted to the inpatient psychiatric unit [facility] for acute psychiatric stabilization in regards to symptomatology as described in the HPI above. The differential diagnosis at time of admission included: schizophrenia vs substance induced psychotic disorder schizoaffective vs bipolar vs adjustment disorder. While on the unit Carlos A Green was involved in individual, group, occupational and milieu therapy.   Psychiatric medications were adjusted during this hospitalization. Carlos A Beckford. demonstrated a progressive improvement in overall condition. Much of patient's initial presentation appeared to be related to situational stressors, effects of medication non-compliance drugs of abuse, and psychological factors. Please see individual progress notes for more specific details regarding patient's hospitalization course. Carlos A Beckford. reports feeling well and moods are good. Denies SI/HI/AH/VH. No aggression or violence. Appropriately interactive and aware. Tolerating medications well. Eating and sleeping fairly. Patient with request for discharge today. There are no grounds to seek a TDO. At time of discharge, Melita Duane. is without significant problems of depression, psychosis, or jack. Patient free of suicidal and homicidal ideations (appears to be at very low risk of suicide or homicide) and reports many positive predictive factors in terms of not attempting suicide or homicide. Overall presentation at time of discharge is most consistent with the diagnosis of Obsessive Compulsive Disorder with paranoia. Patient has maximized benefit to be derived from acute inpatient psychiatric treatment. All members of the treatment team concur with each other in regards to plans for discharge today. Patient and family are aware and in agreement with discharge and discharge plan.          LABS AND IMAGAING:    Labs Reviewed   HEMOGLOBIN A1C WITH EAG - Abnormal; Notable for the following components:       Result Value    Hemoglobin A1c 6.0 (*)     All other components within normal limits   LIPID PANEL - Abnormal; Notable for the following components:    Triglyceride 218 (*)     LDL, calculated 104.4 (*)     CHOL/HDL Ratio 5.4 (*)     All other components within normal limits     No results found for: DS35, PHEN, PHENO, PHENT, DILF, DS39, PHENY, PTN, VALF2, VALAC, VALP, VALPR, DS6, CRBAM, CRBAMP, CARB2, XCRBAM  Admission on 09/30/2022   Component Date Value Ref Range Status    Hemoglobin A1c 10/03/2022 6.0 (A)  4.0 - 5.6 % Final    Est. average glucose 10/03/2022 126  mg/dL Final    Cholesterol, total 10/03/2022 182  <200 MG/DL Final    Triglyceride 10/03/2022 218 (A)  <150 MG/DL Final    HDL Cholesterol 10/03/2022 34  MG/DL Final    LDL, calculated 10/03/2022 104.4 (A)  0 - 100 MG/DL Final    VLDL, calculated 10/03/2022 43.6  MG/DL Final    CHOL/HDL Ratio 10/03/2022 5.4 (A)  0.0 - 5.0   Final   Admission on 09/29/2022, Discharged on 09/30/2022   Component Date Value Ref Range Status    Sodium 09/29/2022 137  136 - 145 mmol/L Final    Potassium 09/29/2022 3.5  3.5 - 5.1 mmol/L Final    Chloride 09/29/2022 104  97 - 108 mmol/L Final    CO2 09/29/2022 24  21 - 32 mmol/L Final    Anion gap 09/29/2022 9  5 - 15 mmol/L Final    Glucose 09/29/2022 137 (A)  65 - 100 mg/dL Final    BUN 09/29/2022 11  6 - 20 MG/DL Final    Creatinine 09/29/2022 1.14  0.70 - 1.30 MG/DL Final    BUN/Creatinine ratio 09/29/2022 10 (A)  12 - 20   Final    GFR est AA 09/29/2022 >60  >60 ml/min/1.73m2 Final    GFR est non-AA 09/29/2022 >60  >60 ml/min/1.73m2 Final    Calcium 09/29/2022 9.1  8.5 - 10.1 MG/DL Final    Bilirubin, total 09/29/2022 1.1 (A)  0.2 - 1.0 MG/DL Final    ALT (SGPT) 09/29/2022 39  12 - 78 U/L Final    AST (SGOT) 09/29/2022 30  15 - 37 U/L Final    Alk.  phosphatase 09/29/2022 80  45 - 117 U/L Final    Protein, total 09/29/2022 7.7  6.4 - 8.2 g/dL Final    Albumin 09/29/2022 4.0  3.5 - 5.0 g/dL Final    Globulin 09/29/2022 3.7  2.0 - 4.0 g/dL Final    A-G Ratio 09/29/2022 1.1  1.1 - 2.2   Final    ALCOHOL(ETHYL),SERUM 09/29/2022 <10  <10 MG/DL Final    AMPHETAMINES 09/29/2022 Positive (A)  NEG   Final    BARBITURATES 09/29/2022 Negative  NEG   Final    BENZODIAZEPINES 09/29/2022 Negative  NEG   Final    COCAINE 09/29/2022 Negative  NEG   Final    METHADONE 09/29/2022 Negative  NEG   Final    OPIATES 09/29/2022 Negative  NEG Final    PCP(PHENCYCLIDINE) 09/29/2022 Negative  NEG   Final    THC (TH-CANNABINOL) 09/29/2022 Negative  NEG   Final    Drug screen comment 09/29/2022 (NOTE)   Final    SARS-CoV-2 by PCR 09/29/2022 Please find results under separate order    Final    Specimen source 09/29/2022 Nasopharyngeal    Final    COVID-19 rapid test 09/29/2022 Not detected  NOTD   Final    SARS-CoV-2 by PCR 09/29/2022 Not detected  NOTD   Final    Influenza A by PCR 09/29/2022 Not detected  NOTD   Final    Influenza B by PCR 09/29/2022 Not detected  NOTD   Final    WBC 09/30/2022 10.5  4.1 - 11.1 K/uL Final    RBC 09/30/2022 4.82  4.10 - 5.70 M/uL Final    HGB 09/30/2022 15.6  12.1 - 17.0 g/dL Final    HCT 09/30/2022 45.7  36.6 - 50.3 % Final    MCV 09/30/2022 94.8  80.0 - 99.0 FL Final    MCH 09/30/2022 32.4  26.0 - 34.0 PG Final    MCHC 09/30/2022 34.1  30.0 - 36.5 g/dL Final    RDW 09/30/2022 12.6  11.5 - 14.5 % Final    PLATELET 56/49/1263 268  150 - 400 K/uL Final    MPV 09/30/2022 8.5 (A)  8.9 - 12.9 FL Final    NRBC 09/30/2022 0.0  0  WBC Final    ABSOLUTE NRBC 09/30/2022 0.00  0.00 - 0.01 K/uL Final    NEUTROPHILS 09/30/2022 64  32 - 75 % Final    LYMPHOCYTES 09/30/2022 24  12 - 49 % Final    MONOCYTES 09/30/2022 9  5 - 13 % Final    EOSINOPHILS 09/30/2022 2  0 - 7 % Final    BASOPHILS 09/30/2022 1  0 - 1 % Final    IMMATURE GRANULOCYTES 09/30/2022 0  0.0 - 0.5 % Final    ABS. NEUTROPHILS 09/30/2022 6.9  1.8 - 8.0 K/UL Final    ABS. LYMPHOCYTES 09/30/2022 2.5  0.8 - 3.5 K/UL Final    ABS. MONOCYTES 09/30/2022 0.9  0.0 - 1.0 K/UL Final    ABS. EOSINOPHILS 09/30/2022 0.2  0.0 - 0.4 K/UL Final    ABS. BASOPHILS 09/30/2022 0.1  0.0 - 0.1 K/UL Final    ABS. IMM. GRANS. 09/30/2022 0.0  0.00 - 0.04 K/UL Final    DF 09/30/2022 AUTOMATED    Final    Urine culture hold 09/29/2022 Urine on hold in Microbiology dept for 2 days.   If unpreserved urine is submitted, it cannot be used for addtional testing after 24 hours, recollection will be required. Final     MRI ABD W WO CONT    Result Date: 9/19/2022  EXAM:  MRI ABD W WO CONT INDICATION: Liver lesion. COMPARISON: CT performed on 8/22/2022 and 5/29/2009 TECHNIQUE: Coronal T2 and postcontrast T1; Axial T2, in/out of phase, MRCP, pre- and dynamic postcontrast T1 MRI of the abdomen. 20 mL of ProHance. Subtractions were performed. FINDINGS: Liver: Segment 4A lesion demonstrates T2 hyperintensity. Nodular discontinuous peripheral enhancement on arterial phase with centripetal filling on delayed images. Biliary tree: Gallbladder is unremarkable. No biliary dilatation. Pancreas: Unremarkable Spleen: Unremarkable Adrenal glands: Unremarkable Kidneys: Bilateral simple renal cysts. No further follow-up is indicated Vasculature: Unremarkable Bowel: Unremarkable Lymph nodes: No lymphadenopathy is identified. Miscellaneous: None     1. Lesion segment 4A of the liver demonstrates nodular peripheral discontinuous enhancement with centripetal filling and T2 hyperintensity most consistent with benign a hemangioma. Review of a prior study from 2009 which was performed without contrast demonstrates a possible lesion in this region which was poorly visualized and incompletely characterized but without significant change further supporting a diagnosis of hemangioma. 2.  Other incidental findings as above                   DISPOSITION:    Home. Patient to f/u with drug/etoh rehabilitation, psychiatric, and psychotherapy appointments. Patient is to f/u with internist as directed. FOLLOW-UP CARE:    Activity as tolerated  Regular diet  Wound Care: none needed. Follow-up Information       Follow up With Specialties Details [facility] [address] [facility]  Follow up Please go to [facility] to complete an intake for psychiatric services and to obtain medication management. Dejah [address]  [phone]                   PROGNOSIS:   Fair ---- based on nature of patient's pathology/ies and treatment compliance issues. Prognosis is greatly dependent upon patient's ability to remain sober and to follow up with scheduled appointments as well as to comply with psychiatric medications as prescribed. DISCHARGE MEDICATIONS:     Informed consent given for the use of following psychotropic medications:  Current Discharge Medication List        START taking these medications    Details   fluticasone propionate (FLONASE) 50 mcg/actuation nasal spray 2 sprays in each nostrils daily  Indications: inflammation of the nose due to an allergy  Qty: 1 Each, Refills: 0  Start date: 10/6/2022      haloperidoL (HALDOL) 10 mg tablet Take 1 Tablet by mouth nightly. Indications: delusional disorder  Qty: 30 Tablet, Refills: 0  Start date: 10/6/2022           CONTINUE these medications which have CHANGED    Details   b-complex with vitamin c tablet Take 1 Tablet by mouth daily. Indications: treatment to prevent vitamin deficiency  Qty: 30 Tablet, Refills: 0  Start date: 10/6/2022      Omeprazole delayed release (PRILOSEC D/R) 20 mg tablet Take 1 Tablet by mouth nightly. Indications: gastroesophageal reflux disease  Qty: 30 Tablet, Refills: 0  Start date: 10/6/2022      simvastatin (ZOCOR) 40 mg tablet Take 1 Tablet by mouth nightly. Indications: high cholesterol  Qty: 30 Tablet, Refills: 0  Start date: 10/6/2022           CONTINUE these medications which have NOT CHANGED    Details   cholecalciferol (Vitamin D3) (1000 Units /25 mcg) tablet Take 1,000 Units by mouth daily. Indications: prevention of vitamin D deficiency      ibuprofen (MOTRIN) 200 mg tablet Take 600 mg by mouth two (2) times daily as needed for Pain.      levothyroxine (SYNTHROID) 175 mcg tablet Take 1 Tablet by mouth Daily (before breakfast). Qty: 180 Tablet, Refills: 3      testosterone cypionate (DEPOTESTOTERONE CYPIONATE) 200 mg/mL injection 100 mg by IntraMUSCular route Once every 2 weeks.       multivitamin (ONE A DAY) tablet Take 1 Tab by mouth daily. hydrocortisone (CORTEF) 20 mg tablet Take 20 mg by mouth daily. Indications: Alfonso's disease           STOP taking these medications       naproxen sodium (Aleve) 220 mg tablet Comments:   Reason for Stopping:                      A coordinated, multidisplinary treatment team round was conducted with Yuliya Dent Jr.---this is done daily here at [facility]. This team consists of the nurse, psychiatric unit pharmacist,  and David Denis. I have spent greater than 35 minutes on discharge work.     Signed:  Pierce Officer, MD  10/6/2022

## 2022-10-06 NOTE — BH NOTES
Behavioral Health Transition Record to Provider    Patient Name: Kayley Hammond. YOB: 1965  Medical Record Number: [id number]  Date of Admission: 9/30/2022  Date of Discharge: 10/6/2022    Attending Provider: Marciano Peoples MD  Discharging Provider: Marciano Peoples MD  To contact this individual call [phone] and ask the  to page. If unavailable, ask to be transferred to [address] [place] [address] Provider on call. [facility] Provider will be available on call 24/7 and during holidays. Primary Care Provider: None    Allergies   Allergen Reactions    Contrast Dye [Iodine] Hives       Reason for Admission: \"My wife of 28 years has been cheating on me and my daughters do not believe me. \"     HISTORY OF PRESENT ILLNESS:  This is a 52-year-old  male with no prior psychiatric history, he works in pesticides and is an , who has Alfonso's disease, had a thyroidectomy recently and low testosterone, comes to the hospital voluntary because his daughter who works in the snf system found him in his room with his guns laid on his bed and suspected something was wrong as he was paranoid and not in his right mind. Apparently, the patient had made statements that his wife had written on his guns like initials of a man that he believes she is cheating on him because of her vindictive and evil nature. They have been having relationship stress and problems for the last year and a half with no sexual contact. In the last 6 months, she has been more distant, working late at night and at job has a new boss and went away for training this past week. She has never been loving and touch or in words. When he talked to her, he heard a male in the background and she was cursing about it under her breath and when he texted her, she changed her tone.   He found some other evidence around the house like an object that was like a red laminated piece of device or paper that he found in her car, he was cleaning up around the house. He thought it was his daughter's, tried to give it to them and they said it was not theirs. When he showed it to his wife, she got flustered and asked where he got it from versus saying it was not hers or it was hers. On that, he has noted scrapings that when under a certain lighting, he can see statements like \"I like the way you fuck me and make me turn on,\" and other statements like \"I hate you very much Gama\" and different writings all over it. He has kept that as evidence, but he says in trying to bring it (the writing) forth, he messed part of it up, but the other parts that are legible still have information about writing this in her handwriting and he also found a key in the drawer that was not on the regular key rings that she noted for her work and for the house. It was not his daughters, it was not his and has scrapings on it as well showing a room number and she said yes, a new key for office that she does not need to go into, it is like a friend's office and he as an  has worked in her building and knows a lot of the rooms and it seems that she was telling fibs about that so he has suspicions based on these things that something is a lie and signs initially started showing up everywhere around the house and on his personal items and this upset him to think is this happening and he is like \"am I losing my mind. \"  He tried to show his daughters, they could not see it or did not want to see it and so when he was checking his room, he noticed some changes in the ways some of his personal items were arranged.   He looked at them and noticed there were some writings on them, and the initials of this individual that he believes his wife is cheating on him with and one of the initials on his guns and when he noticed that, he said \"well, let me check the others\" and he went through his first locked box and found his guns racks were found with their initials all over those. He was trying to clean them and daughter came home and saw the guns out on his bed and did not understand what he was doing. As suspected, he might be losing his mind, called the police on him and he came voluntarily to talk to somebody. Admission Diagnosis: Unspecified psychosis not due to a substance or known physiological condition ([facility] [address] [F29]    * No surgery found *    Results for orders placed or performed during the hospital encounter of 09/30/22   HEMOGLOBIN A1C WITH EAG   Result Value Ref Range    Hemoglobin A1c 6.0 (H) 4.0 - 5.6 %    Est. average glucose 126 mg/dL   LIPID PANEL   Result Value Ref Range    Cholesterol, total 182 <200 MG/DL    Triglyceride 218 (H) <150 MG/DL    HDL Cholesterol 34 MG/DL    LDL, calculated 104.4 (H) 0 - 100 MG/DL    VLDL, calculated 43.6 MG/DL    CHOL/HDL Ratio 5.4 (H) 0.0 - 5.0         Immunizations administered during this encounter:   Immunization History   Administered Date(s) Administered    COVID-19, PFIZER PURPLE top, DILUTE for use, (age 15 y+), IM, 30mcg/0.3mL 11/18/2021, 12/09/2021       Screening for Metabolic Disorders for Patients on Antipsychotic Medications  (Data obtained from the EMR)    Estimated Body Mass Index  Estimated body mass index is 28.9 kg/m² as calculated from the following:    Height as of 9/29/22: 5' 11\" (1.803 m). Weight as of 9/29/22: 94 kg (207 lb 3.7 oz).      Vital Signs/Blood Pressure  Visit Vitals  /77 (BP 1 Location: Left upper arm, BP Patient Position: Lying right side)   Pulse 86   Temp 97.9 °F (36.6 °C)   Resp 18   SpO2 97%       Blood Glucose/Hemoglobin A1c  Lab Results   Component Value Date/Time    Glucose 137 (H) 09/29/2022 11:18 PM    Glucose (POC) 186 (H) 06/10/2022 06:08 PM       Lab Results   Component Value Date/Time    Hemoglobin A1c 6.0 (H) 10/03/2022 06:05 AM        Lipid Panel  Lab Results   Component Value Date/Time    Cholesterol, total 182 10/03/2022 06:05 AM    HDL Cholesterol 34 10/03/2022 06:05 AM    LDL,Direct 66 07/23/2010 11:40 AM    LDL, calculated 104.4 (H) 10/03/2022 06:05 AM    Triglyceride 218 (H) 10/03/2022 06:05 AM    CHOL/HDL Ratio 5.4 (H) 10/03/2022 06:05 AM        Discharge Diagnosis: Adjustment disorder, depressed mood, vs delusional disorder    Discharge Plan: The patient Jamel Dilling exhibits the ability to control behavior in a less restrictive environment. Patient's level of functioning is improving. No assaultive/destructive behavior has been observed for the past 24 hours. No suicidal/homicidal threat or behavior has been observed for the past 24 hours. There is no evidence of serious medication side effects. Patient has not been in physical or protective restraints for at least the past 24 hours. If weapons involved, how are they secured? Pt wife took the Pt weapons out of the home and stored them in another location. Is patient aware of and in agreement with discharge plan? Yes         Arrangements for medication:  Prescriptions given to patient, given a weeks supply or 30 day supply. Copy of discharge instructions to provider?:  Yes [employer]. Arrangements for transportation home:  Pt was picked up by his daughter. Keep all follow up appointments as scheduled, continue to take prescribed medications per physician instructions.     Mental health crisis number:  [phone] or your local mental health crisis line number at [address] [facility] Emergency WARM LINE        [phone] ([phone])        M-F: 9am to 9pm        Sat & Sun: [facility] suicide prevention lines:                              0-825-CKWLLOL ([phone])        0-143-082-TALK ([phone])    24/7 Crisis Text Line:  Text HOME to [phone]    Discharge Medication List and Instructions:   Discharge Medication List as of 10/6/2022 10:25 AM        START taking these medications    Details   fluticasone propionate (FLONASE) 50 mcg/actuation nasal spray 2 sprays in each nostrils daily  Indications: inflammation of the nose due to an allergy, Normal, Disp-1 Each, R-0      haloperidoL (HALDOL) 10 mg tablet Take 1 Tablet by mouth nightly. Indications: delusional disorder, Normal, Disp-30 Tablet, R-0           CONTINUE these medications which have CHANGED    Details   b-complex with vitamin c tablet Take 1 Tablet by mouth daily. Indications: treatment to prevent vitamin deficiency, Normal, Disp-30 Tablet, R-0      Omeprazole delayed release (PRILOSEC D/R) 20 mg tablet Take 1 Tablet by mouth nightly. Indications: gastroesophageal reflux disease, Normal, Disp-30 Tablet, R-0      simvastatin (ZOCOR) 40 mg tablet Take 1 Tablet by mouth nightly. Indications: high cholesterol, Normal, Disp-30 Tablet, R-0           CONTINUE these medications which have NOT CHANGED    Details   cholecalciferol (Vitamin D3) (1000 Units /25 mcg) tablet Take 1,000 Units by mouth daily. Indications: prevention of vitamin D deficiency, Historical Med      ibuprofen (MOTRIN) 200 mg tablet Take 600 mg by mouth two (2) times daily as needed for Pain., Historical Med      levothyroxine (SYNTHROID) 175 mcg tablet Take 1 Tablet by mouth Daily (before breakfast). , Normal, Disp-180 Tablet, R-3      testosterone cypionate (DEPOTESTOTERONE CYPIONATE) 200 mg/mL injection 100 mg by IntraMUSCular route Once every 2 weeks. , Historical Med      multivitamin (ONE A DAY) tablet Take 1 Tab by mouth daily. , Historical Med      hydrocortisone (CORTEF) 20 mg tablet Take 20 mg by mouth daily.  Indications: Sumter's disease, Historical Med           STOP taking these medications       naproxen sodium (Aleve) 220 mg tablet Comments:   Reason for Stopping:               Unresulted Labs (24h ago, onward)      None          To obtain results of studies pending at discharge, please contact [phone]    Follow-up Information       Follow up With Specialties Details Why [address] [facility]  Follow up Please go to [facility] to complete an intake for psychiatric services and to obtain medication management. Dejah [address]apleton [address]. [address] [facility]  [address]  [phone]    Psychiatry psychosomatic Medicine  Call Plese call and make an appointemnt with a psychiatrist for medication mangement. [address] [facility] [address]  [phone]    None    None (395) Patient stated that they have no PCP      Monthly injection  Follow up on 11/1/2022 You were started on a monthly injection, called haloperidol decanoate, during your hospitalization. You received haloperidol decanoate 100 mg injection on 10/6/22 and your next dose is due on 11/1/22. Advanced Directive:   Does the patient have an appointed surrogate decision maker? No  Does the patient have a Medical Advance Directive? No  Does the patient have a Psychiatric Advance Directive? No  If the patient does not have a surrogate or Medical Advance Directive AND Psychiatric Advance Directive, the patient was offered information on these advance directives Yes, Pt declined    Patient Instructions: Please continue all medications until otherwise directed by physician. Tobacco Cessation Discharge Plan:   Is the patient a smoker and needs referral for smoking cessation? no  Patient referred to the following for smoking cessation with an appointment? No  Patient was offered medication to assist with smoking cessation at discharge? no  Was education for smoking cessation added to the discharge instructions? No    Alcohol/Substance Abuse Discharge Plan:   Does the patient have a history of substance/alcohol abuse and requires a referral for treatment? No  Patient referred to the following for substance/alcohol abuse treatment with an appointment? No  Patient was offered medication to assist with alcohol cessation at discharge? No  Was education for substance/alcohol abuse added to discharge instructions? No    Patient discharged to home.

## 2022-10-06 NOTE — DISCHARGE INSTRUCTIONS
DISCHARGE SUMMARY    NAME:Carlos A Hughes. : 1965  MRN: [id number]    The patient Janell Maldonado. exhibits the ability to control behavior in a less restrictive environment. Patient's level of functioning is improving. No assaultive/destructive behavior has been observed for the past 24 hours. No suicidal/homicidal threat or behavior has been observed for the past 24 hours. There is no evidence of serious medication side effects. Patient has not been in physical or protective restraints for at least the past 24 hours. If weapons involved, how are they secured? Pt weapons were removed from the Pt home. Is patient aware of and in agreement with discharge plan? Yes    Arrangements for medication:  Prescriptions sent to the Pt pharmacy. Copy of discharge instructions to provider?:  Yes, to [address] [place] [address] for transportation home:  Pt will be picked up by his parents. Keep all follow up appointments as scheduled, continue to take prescribed medications per physician instructions.   Mental health crisis number:  359 or your local mental health crisis line number at [place] [address] Emergency WARM LINE      [phone] ([phone])      M-F: 9am to 9pm      Sat & Sun: [address] [facility] suicide prevention lines:                             8-635-BRSBQEP ([phone])       9-906-761-TALK ([phone])    Crisis Text Line:  Text HOME to [phone]

## 2022-10-24 ENCOUNTER — OFFICE VISIT (OUTPATIENT)
Dept: INTERNAL MEDICINE CLINIC | Age: 57
End: 2022-10-24
Payer: COMMERCIAL

## 2022-10-24 VITALS
WEIGHT: 204.8 LBS | HEIGHT: 71 IN | OXYGEN SATURATION: 99 % | TEMPERATURE: 98.1 F | SYSTOLIC BLOOD PRESSURE: 144 MMHG | RESPIRATION RATE: 16 BRPM | HEART RATE: 100 BPM | DIASTOLIC BLOOD PRESSURE: 92 MMHG | BODY MASS INDEX: 28.67 KG/M2

## 2022-10-24 DIAGNOSIS — Z09 HOSPITAL DISCHARGE FOLLOW-UP: Primary | ICD-10-CM

## 2022-10-24 DIAGNOSIS — F19.951 DRUG INDUCED HALLUCINATIONS (HCC): ICD-10-CM

## 2022-10-24 DIAGNOSIS — F15.11 METHAMPHETAMINE ABUSE IN REMISSION (HCC): ICD-10-CM

## 2022-10-24 DIAGNOSIS — F43.23 ADJUSTMENT DISORDER WITH MIXED ANXIETY AND DEPRESSED MOOD: ICD-10-CM

## 2022-10-24 DIAGNOSIS — Z63.5 MARRIAGE SEPARATION: ICD-10-CM

## 2022-10-24 DIAGNOSIS — R03.0 BLOOD PRESSURE ELEVATED WITHOUT HISTORY OF HTN: ICD-10-CM

## 2022-10-24 DIAGNOSIS — Z63.0 MARITAL RELATIONSHIP PROBLEM: ICD-10-CM

## 2022-10-24 PROCEDURE — 1111F DSCHRG MED/CURRENT MED MERGE: CPT | Performed by: NURSE PRACTITIONER

## 2022-10-24 PROCEDURE — 99214 OFFICE O/P EST MOD 30 MIN: CPT | Performed by: NURSE PRACTITIONER

## 2022-10-24 SDOH — SOCIAL STABILITY - SOCIAL INSECURITY: DISRUPTION OF FAMILY BY SEPARATION AND DIVORCE: Z63.5

## 2022-10-24 SDOH — SOCIAL STABILITY - SOCIAL INSECURITY: PROBLEMS IN RELATIONSHIP WITH SPOUSE OR PARTNER: Z63.0

## 2022-10-24 NOTE — PROGRESS NOTES
Lisandra Benjamin is a 65 y.o. male     Chief Complaint   Patient presents with    Hospital Follow Up     Seen at [facility] on 9/30/22-10/6/22 for breakdown         Visit Vitals  BP (!) 144/92 (BP 1 Location: Left arm, BP Patient Position: Sitting, BP Cuff Size: Adult)   Pulse 100   Temp 98.1 °F (36.7 °C) (Oral)   Resp 16   Ht 5' 11\" (1.803 m)   Wt 204 lb 12.8 oz (92.9 kg)   SpO2 99%   BMI 28.56 kg/m²       Health Maintenance Due   Topic Date Due    Hepatitis C Screening  Never done    Pneumococcal 0-64 years (1 - PCV) Never done    DTaP/Tdap/Td series (1 - Tdap) Never done    Colorectal Cancer Screening Combo  Never done    Shingrix Vaccine Age 50> (1 of 2) Never done    COVID-19 Vaccine (3 - Booster for Pfizer series) 05/09/2022    Flu Vaccine (1) Never done    Depression Monitoring  10/15/2022         1. \"Have you been to the ER, urgent care clinic since your last visit? Hospitalized since your last visit? \" Seen at [facility] on 9/30/22-10/6/22 for breakdown    2. \"Have you seen or consulted any other health care providers outside of the [address] [place] [address] since your last visit? \" No     3. For patients aged 39-70: Has the patient had a colonoscopy / FIT/ Cologuard? No      If the patient is female:    4. For patients aged 41-77: Has the patient had a mammogram within the past 2 years? NA - based on age or sex      11. For patients aged 21-65: Has the patient had a pap smear?  NA - based on age or sex

## 2022-10-24 NOTE — PROGRESS NOTES
Chief Complaint   Patient presents with    Hospital Follow Up     Seen at [facility] on 9/30/22-10/6/22 for breakdown       SUBJECTIVE:    Paddy Ortiz is a 65 y.o. male who is new to me, and here today for a follow up appointment after a brief behavioral health hospitalization from 9/30/2022 to 10/6/2022 where he was treated due to an episode of sudden behavioral disturbance which he believes stemmed directly from abuse of methamphetamines which he had been engaging in for several months prior to this episode. For the past several weeks leading up to this incident, the patient admits that he became more and more suspicious of his wife possibly having an extramarital affair after 28 years of marriage. He states this was fueled by his methamphetamine use, and he had started to have visual hallucinations as well. He states that he \"started seeing initials of a richard's name that I thought she was having an affair with on my guns and other things around the house. I started pulling out my guns to inspect them, and then laid them on the bed. \"  He stated. \"That is when my daughter came to the house and saw the guns. \"  His daughter then called [id number] and police were dispatched to his residence where he was confronted by officers about his daughter's findings. He states he was talking to police officers in his driveway when his wife drove up. He states that he heard part of a conversation that she misunderstood, and left the scene. Admittedly, he states that their marriage has been \"on the rocks for a while now. \"  He also admits to a personal history of infidelity at 5 years into their marriage which he believes she never truly for gave him for. He states they reconciled, but their marriage has been a difficult one ever since. He is quite tearful about this. He states he was discharged from the hospital and placed on Haldol tablets to take each evening.   However, he states he did not like the way that made him feel, and due to the excessive drowsiness discontinued the medication. He states he has signed up to do counseling, but would prefer not to have to take medication. He states he has discontinued any/all use of methamphetamines, and does not have a desire to return to using this substance again. He denies any further hallucinations, but admits to some depression and anxiety related to this incident. He states he and his wife are  for now, and does not know if they will be able to reconcile or not. He is amicable to couples counseling, but is unsure whether she would be willing to participate or not. Current Outpatient Medications   Medication Sig Dispense Refill    b-complex with vitamin c tablet Take 1 Tablet by mouth daily. Indications: treatment to prevent vitamin deficiency 30 Tablet 0    Omeprazole delayed release (PRILOSEC D/R) 20 mg tablet Take 1 Tablet by mouth nightly. Indications: gastroesophageal reflux disease 30 Tablet 0    simvastatin (ZOCOR) 40 mg tablet Take 1 Tablet by mouth nightly. Indications: high cholesterol 30 Tablet 0    fluticasone propionate (FLONASE) 50 mcg/actuation nasal spray 2 sprays in each nostrils daily  Indications: inflammation of the nose due to an allergy 1 Each 0    cholecalciferol (VITAMIN D3) (1000 Units /25 mcg) tablet Take 1,000 Units by mouth daily. Indications: prevention of vitamin D deficiency      ibuprofen (MOTRIN) 200 mg tablet Take 600 mg by mouth two (2) times daily as needed for Pain.      levothyroxine (SYNTHROID) 175 mcg tablet Take 1 Tablet by mouth Daily (before breakfast). 180 Tablet 3    testosterone cypionate (DEPOTESTOTERONE CYPIONATE) 200 mg/mL injection 100 mg by IntraMUSCular route Once every 2 weeks. multivitamin (ONE A DAY) tablet Take 1 Tab by mouth daily. hydrocortisone (CORTEF) 20 mg tablet Take 20 mg by mouth daily.  Indications: Alfonso's disease       Past Medical History:   Diagnosis Date    [place]'s disease ([facility] Arthritis     Autoimmune disease [facility])     YESSICA'S DISEASE    Chest pain 8/22/2017    Chronic back pain 8/13/2017    Chronic insomnia 8/10/2017    Difficulty in urination 8/10/2017    Endocrine disease     yessica's disease    GERD (gastroesophageal reflux disease)     Hypercholesteremia     Hypogonadism male 8/10/2017    Hypothyroidism 8/10/2017    ADDISONS DISEASE    Kidney stones     multiple, has had lithotripsey as well as passed on his own    Low testosterone 8/10/2018    Opioid dependence ([facility] 8/10/2017    Other ill-defined conditions(799.89)     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, on methadone    Psychiatric disorder     ANXIETY    Severe anxiety with panic 12/5/2018    Thumb pain 8/10/2017    Vitamin D deficiency 8/10/2017     Past Surgical History:   Procedure Laterality Date    HX ORTHOPAEDIC      LEFT ROTATER CUFF REPAIR     HX ORTHOPAEDIC  08/2018    LUMBAR FUSION    HX ORTHOPAEDIC Left     CYST REMOVED FROM HAND    HX OTHER SURGICAL      pylonidal cyst    HX OTHER SURGICAL      penial fracture     Allergies   Allergen Reactions    Contrast Dye [Iodine] Hives       REVIEW OF SYSTEMS:                                        POSITIVE= bold text  Negative = regular text    General:                     fever, chills, sweats, generalized weakness, weight loss/gain,                                       loss of appetite   Eyes:                           blurred vision, eye pain, loss of vision, double vision  ENT:                            rhinorrhea, pharyngitis   Respiratory:               cough, sputum production, SOB, SALGADO, wheezing, pleuritic pain   Cardiology:                chest pain, palpitations, orthopnea, PND, edema, syncope   Gastrointestinal:       abdominal pain , N/V, diarrhea, dysphagia, constipation, bleeding   Genitourinary:           frequency, urgency, dysuria, hematuria, incontinence   Muskuloskeletal :      arthralgia, myalgia, back pain  Hematology:              easy bruising, nose or gum bleeding, lymphadenopathy   Dermatological:         rash, ulceration, pruritis, color change / jaundice  Endocrine:                 hot flashes or polydipsia   Neurological:             headache, dizziness, confusion, focal weakness, paresthesia,                                      Speech difficulties, memory loss, gait difficulty  Psychological:          Feelings of anxiety, depression, agitation        Social History     Socioeconomic History    Marital status:     Number of children: 2   Tobacco Use    Smoking status: Every Day     Packs/day: 1.00     Years: 40.00     Pack years: 40.00     Types: Cigarettes    Smokeless tobacco: Never    Tobacco comments:     LOOKING INTO CHANTIX   Vaping Use    Vaping Use: Never used   Substance and Sexual Activity    Alcohol use: No     Comment: RARELY    Drug use: No     Social Determinants of Health     Financial Resource Strain: Low Risk     Difficulty of Paying Living Expenses: Not hard at all   Food Insecurity: No Food Insecurity    Worried About Running Out of Food in the Last Year: Never true    Ran Out of Food in the Last Year: Never true     Family History   Problem Relation Age of Onset    Cancer Mother         breast    High Cholesterol Mother     Stroke Father     Diabetes Father     High Cholesterol Father     Cancer Sister         breast    High Cholesterol Sister     Cancer Sister         breast    High Cholesterol Sister     Cancer Brother         SKIN CANCER    Other Brother         GOUT    High Cholesterol Brother     Heart Attack Paternal Grandmother     Anesth Problems Neg Hx        OBJECTIVE:     Visit Vitals  BP (!) 144/92 (BP 1 Location: Left arm, BP Patient Position: Sitting, BP Cuff Size: Adult)   Pulse 100   Temp 98.1 °F (36.7 °C) (Oral)   Resp 16   Ht 5' 11\" (1.803 m)   Wt 204 lb 12.8 oz (92.9 kg)   SpO2 99%   BMI 28.56 kg/m²       Constitutional: He appears well nourished, of stated age, and dressed appropriately.   Eyes: Sclera anicteric, PERRLA, EOMI  Respiratory: Clear to ascultation X5, normal inspiratory effort, no adventitious breath sounds. Cardiovascular: Regular rate and rhythm, no murmurs, rubs or gallops, PMI not displaced, No thrills, no peripheral edema  Neuro: Non-focal exam, A & O X 3.   Psychiatric: Appropriate affect and demeanor, pleasant and cooperative. Patient's thought content and thought processing appear to be within normal limits. Patient denies any auditory or visual hallucinations or any homicidal or suicidal thoughts. ASSESSMENT/PLAN:     ICD-10-CM ICD-9-CM    1. Hospital discharge follow-up  Z09 V67.59       2. Adjustment disorder with mixed anxiety and depressed mood  F43.23 309.28       3. [facility] ([facility]  F19.951 292.12       4. Methamphetamine abuse in remission ([facility]  F15.11 305.73       5. Blood pressure elevated without history of HTN  R03.0 796.2       6. Marriage separation  Z63.5 V61.03       7. Marital relationship problem  Z63.0 V61.10         1: Patient to arrange for counseling with therapist as discussed. 2: Patient to hold Haldol for now. I have discussed other options of medications. Again, patient declines use at this time. 3: I have suggested support groups for him to attend. Patient will consider. 4: Patient to avoid any/all use of methamphetamine or other illicit substances. 5: Patient to follow-up with me in approximately 6 weeks, or sooner as needed. Patient states understanding and agrees with plan. ATTENTION:   This medical record was transcribed using an electronic medical records system. Although proofread, it may and can contain electronic and spelling errors. Other human spelling and other errors may be present. Corrections may be executed at a later time. Please feel free to contact us for any clarifications as needed. Follow-up and Dispositions    Return in about 6 weeks (around 12/5/2022) for Follow up . Signed,  Gerri Iraheta.  Gilma Shah, MSN APRN FNP-BC

## 2022-11-07 ENCOUNTER — TELEPHONE (OUTPATIENT)
Dept: INTERNAL MEDICINE CLINIC | Age: 57
End: 2022-11-07

## 2022-11-07 NOTE — TELEPHONE ENCOUNTER
Patient called and stated that it was recommended that he have a repeat ct of his lungs in one year.  However, the patient stated that he recently had multiple Ct's and MRI's completed that were ordered by his specialist, Dr. Huey Crowder and wondered if these would be sufficient so that he doesn't have to pay for another test?

## 2022-11-14 ENCOUNTER — TELEPHONE (OUTPATIENT)
Dept: INTERNAL MEDICINE CLINIC | Age: 57
End: 2022-11-14

## 2022-11-14 DIAGNOSIS — R05.3 CHRONIC COUGH: Primary | ICD-10-CM

## 2022-11-14 DIAGNOSIS — F17.210 CIGARETTE NICOTINE DEPENDENCE WITHOUT COMPLICATION: ICD-10-CM

## 2022-11-14 NOTE — TELEPHONE ENCOUNTER
Spoke with patient regarding the low dose CT scan of his lungs being different from the abdomen CT/MRIs he's already had done. Pt acknowledged and agreed and wants to know if you will just put the order in or if he has to be seen for an appointment.

## 2022-12-02 ENCOUNTER — HOSPITAL ENCOUNTER (OUTPATIENT)
Dept: CT IMAGING | Age: 57
End: 2022-12-02
Attending: NURSE PRACTITIONER
Payer: COMMERCIAL

## 2022-12-02 DIAGNOSIS — R05.3 CHRONIC COUGH: ICD-10-CM

## 2022-12-02 DIAGNOSIS — F17.210 CIGARETTE NICOTINE DEPENDENCE WITHOUT COMPLICATION: ICD-10-CM

## 2022-12-02 PROCEDURE — 71271 CT THORAX LUNG CANCER SCR C-: CPT

## 2022-12-06 NOTE — PROGRESS NOTES
Low-dose CT of chest reveals no suspicious or concerning abnormalities at this time. There is presence of fatty liver. Focus on a low-fat/low-cholesterol diet and regular exercise. Repeat in 1 year.

## 2023-01-03 ENCOUNTER — OFFICE VISIT (OUTPATIENT)
Dept: INTERNAL MEDICINE CLINIC | Age: 58
End: 2023-01-03

## 2023-01-03 VITALS
DIASTOLIC BLOOD PRESSURE: 80 MMHG | OXYGEN SATURATION: 98 % | TEMPERATURE: 98.7 F | WEIGHT: 199 LBS | BODY MASS INDEX: 27.86 KG/M2 | HEART RATE: 102 BPM | HEIGHT: 71 IN | SYSTOLIC BLOOD PRESSURE: 132 MMHG | RESPIRATION RATE: 16 BRPM

## 2023-01-03 DIAGNOSIS — F19.951 DRUG INDUCED HALLUCINATIONS (HCC): ICD-10-CM

## 2023-01-03 DIAGNOSIS — F22 PARANOIA (PSYCHOSIS) (HCC): ICD-10-CM

## 2023-01-03 DIAGNOSIS — Z63.0 MARITAL RELATIONSHIP PROBLEM: ICD-10-CM

## 2023-01-03 DIAGNOSIS — F15.10: Primary | ICD-10-CM

## 2023-01-03 DIAGNOSIS — G47.9 SLEEP DISTURBANCE: ICD-10-CM

## 2023-01-03 PROCEDURE — 99214 OFFICE O/P EST MOD 30 MIN: CPT | Performed by: NURSE PRACTITIONER

## 2023-01-03 RX ORDER — TRAZODONE HYDROCHLORIDE 50 MG/1
50-100 TABLET ORAL
Qty: 60 TABLET | Refills: 1 | Status: SHIPPED | OUTPATIENT
Start: 2023-01-03

## 2023-01-03 SDOH — SOCIAL STABILITY - SOCIAL INSECURITY: PROBLEMS IN RELATIONSHIP WITH SPOUSE OR PARTNER: Z63.0

## 2023-01-03 NOTE — PROGRESS NOTES
Rosalie Reed is a 65 y.o. male     Chief Complaint   Patient presents with    Follow-up     6wk       Visit Vitals  /80 (BP 1 Location: Left upper arm, BP Patient Position: Sitting, BP Cuff Size: Adult)   Pulse (!) 102   Temp 98.7 °F (37.1 °C) (Oral)   Resp 16   Ht 5' 11\" (1.803 m)   Wt 199 lb (90.3 kg)   SpO2 98%   BMI 27.75 kg/m²       Health Maintenance Due   Topic Date Due    Hepatitis C Screening  Never done    Pneumococcal 0-64 years (1 - PCV) Never done    DTaP/Tdap/Td series (1 - Tdap) Never done    Colorectal Cancer Screening Combo  Never done    Shingles Vaccine (1 of 2) Never done    COVID-19 Vaccine (3 - Booster for Pf[employer] series) 02/03/2022    Flu Vaccine (1) Never done         1. \"Have you been to the ER, urgent care clinic since your last visit? Hospitalized since your last visit? \" No    2. \"Have you seen or consulted any other health care providers outside of the [address] [place] [address] since your last visit? \" No     3. For patients aged 39-70: Has the patient had a colonoscopy / FIT/ Cologuard? No      If the patient is female:    4. For patients aged 41-77: Has the patient had a mammogram within the past 2 years? NA - based on age or sex      11. For patients aged 21-65: Has the patient had a pap smear?  NA - based on age or sex

## 2023-01-03 NOTE — PROGRESS NOTES
Chief Complaint   Patient presents with    Follow-up     6wk       SUBJECTIVE:    Óscar Henry is a 65 y.o. male who is here today for a follow up appointment regarding continued relapses and use of methamphetamine with psychiatric disturbance and ongoing difficulty sleeping. He is accompanied by his wife today. Since our last encounter on 10/24/2022, the patient has established with a psychologist in which he states he has been seen by her for at least \"5 sessions. \"  Additionally, he also admits to relapsing in his methamphetamine use on at least 2 occasions for several days at a time. He states he has not used any methamphetamine for the past 24 hours. He continues to live at home alone, and has been able to maintain his full-time job. Admittedly, he also continues to have episodes of visual hallucinations and paranoid delusions regarding spousal infidelity to which she has denied repeatedly. His wife states she continues to live outside of the home and one of their children's homes for now due to personal safety concerns. She denies any physical abuse from the patient. Patient openly states that he has no intention of causing any physical harm to his wife or to himself. Current Outpatient Medications   Medication Sig Dispense Refill    traZODone (DESYREL) 50 mg tablet Take 1-2 Tablets by mouth nightly. 60 Tablet 1    b-complex with vitamin c tablet Take 1 Tablet by mouth daily. Indications: treatment to prevent vitamin deficiency 30 Tablet 0    Omeprazole delayed release (PRILOSEC D/R) 20 mg tablet Take 1 Tablet by mouth nightly. Indications: gastroesophageal reflux disease 30 Tablet 0    simvastatin (ZOCOR) 40 mg tablet Take 1 Tablet by mouth nightly. Indications: high cholesterol 30 Tablet 0    cholecalciferol (VITAMIN D3) (1000 Units /25 mcg) tablet Take 1,000 Units by mouth daily.  Indications: prevention of vitamin D deficiency      levothyroxine (SYNTHROID) 175 mcg tablet Take 1 Tablet by mouth Daily (before breakfast). 180 Tablet 3    testosterone cypionate (DEPOTESTOTERONE CYPIONATE) 200 mg/mL injection 100 mg by IntraMUSCular route Once every 2 weeks. multivitamin (ONE A DAY) tablet Take 1 Tab by mouth daily. hydrocortisone (CORTEF) 20 mg tablet Take 20 mg by mouth daily. Indications: Heard's disease      fluticasone propionate (FLONASE) 50 mcg/actuation nasal spray 2 sprays in each nostrils daily  Indications: inflammation of the nose due to an allergy (Patient not taking: Reported on 1/3/2023) 1 Each 0    ibuprofen (MOTRIN) 200 mg tablet Take 600 mg by mouth two (2) times daily as needed for Pain.  (Patient not taking: Reported on 1/3/2023)       Past Medical History:   Diagnosis Date    [place]'s disease ([facility]     Arthritis     Autoimmune disease ([facility]     YESSICA'S DISEASE    Chest pain 8/22/2017    Chronic back pain 8/13/2017    Chronic insomnia 8/10/2017    Difficulty in urination 8/10/2017    Endocrine disease     yessica's disease    GERD (gastroesophageal reflux disease)     Hypercholesteremia     Hypogonadism male 8/10/2017    Hypothyroidism 8/10/2017    ADDISONS DISEASE    Kidney stones     multiple, has had lithotripsey as well as passed on his own    Low testosterone 8/10/2018    Opioid dependence ([facility] 8/10/2017    Other ill-defined conditions(799.89)     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, on methadone    Psychiatric disorder     ANXIETY    Severe anxiety with panic 12/5/2018    Thumb pain 8/10/2017    Vitamin D deficiency 8/10/2017     Past Surgical History:   Procedure Laterality Date    HX ORTHOPAEDIC      LEFT ROTATER CUFF REPAIR     HX ORTHOPAEDIC  08/2018    LUMBAR FUSION    HX ORTHOPAEDIC Left     CYST REMOVED FROM HAND    HX OTHER SURGICAL      pylonidal cyst    HX OTHER SURGICAL      penial fracture     Allergies   Allergen Reactions    Contrast Dye [Iodine] Hives       REVIEW OF SYSTEMS:                                        POSITIVE= bold text Negative = regular text    General:                     fever, chills, sweats, generalized weakness, weight loss/gain,                                       loss of appetite   Eyes:                           blurred vision, eye pain, loss of vision, double vision  ENT:                            rhinorrhea, pharyngitis   Respiratory:               cough, sputum production, SOB, SALGADO, wheezing, pleuritic pain   Cardiology:                chest pain, palpitations, orthopnea, PND, edema, syncope   Gastrointestinal:       abdominal pain , N/V, diarrhea, dysphagia, constipation, bleeding   Genitourinary:           frequency, urgency, dysuria, hematuria, incontinence   Muskuloskeletal :      arthralgia, myalgia, back pain  Hematology:              easy bruising, nose or gum bleeding, lymphadenopathy   Dermatological:         rash, ulceration, pruritis, color change / jaundice  Endocrine:                 hot flashes or polydipsia   Neurological:             headache, dizziness, confusion, focal weakness, paresthesia,                                      Speech difficulties, memory loss, gait difficulty  Psychological:          Feelings of anxiety, depression, agitation, hallucinations        Social History     Socioeconomic History    Marital status:     Number of children: 2   Tobacco Use    Smoking status: Every Day     Packs/day: 1.00     Years: 40.00     Pack years: 40.00     Types: Cigarettes    Smokeless tobacco: Never    Tobacco comments:     LOOKING INTO CHANTIX   Vaping Use    Vaping Use: Never used   Substance and Sexual Activity    Alcohol use: No     Comment: RARELY    Drug use: No     Social Determinants of Health     Financial Resource Strain: Low Risk     Difficulty of Paying Living Expenses: Not hard at all   Food Insecurity: No Food Insecurity    Worried About Running Out of Food in the Last Year: Never true    Ran Out of Food in the Last Year: Never true     Family History   Problem Relation Age of Onset    Cancer Mother         breast    High Cholesterol Mother     Stroke Father     Diabetes Father     High Cholesterol Father     Cancer Sister         breast    High Cholesterol Sister     Cancer Sister         breast    High Cholesterol Sister     Cancer Brother         SKIN CANCER    Other Brother         GOUT    High Cholesterol Brother     Heart Attack Paternal Grandmother     Anesth Problems Neg Hx        OBJECTIVE:     Visit Vitals  /80 (BP 1 Location: Left upper arm, BP Patient Position: Sitting, BP Cuff Size: Adult)   Pulse (!) 102   Temp 98.7 °F (37.1 °C) (Oral)   Resp 16   Ht 5' 11\" (1.803 m)   Wt 199 lb (90.3 kg)   SpO2 98%   BMI 27.75 kg/m²       Constitutional: He appears well nourished, of stated age, and dressed appropriately. Eyes: Sclera anicteric, PERRLA, EOMI  Respiratory: Clear to ascultation X5, normal inspiratory effort, no adventitious breath sounds. Cardiovascular: Regular rate and rhythm, no rubs or gallops, PMI not displaced, No thrills, no peripheral edema  Neuro: Patient appears to be alert and oriented x3. Psychiatric: Moderately anxious affect and demeanor, somewhat suspicious, but otherwise cooperative. Patient's thought content and thought processing appear quite paranoid in nature, and likely due to drug use. Patient admits to visual hallucinations but denies any homicidal or suicidal thoughts. Duration: Duration of encounter was approximately 40 minutes. Extensive time was spent discussing appropriate management and detailed expectations and need for inpatient management of current condition and drug abuse treatment. ASSESSMENT/PLAN:       ICD-10-CM ICD-9-CM    1. Episodic methamphetamine abuse ([facility])  F15.10 305.72       2. Drug induced hallucinations ([facility]  F19.951 292.12       3. Paranoia (psychosis) ([facility])  F22 297.1       4. Sleep disturbance  G47.9 780.50 traZODone (DESYREL) 50 mg tablet      5. Marital relationship problem  Z63.0 V61.10         1:  As explained, patient is to seek inpatient care facility for drug abuse and inpatient management of presenting conditions. I have suggested that he discuss options with insurance company as well as his stated psychologist.  Patient agrees to this. Wife states she will assist in any way possible. 2: Patient directed to discontinue any/all further use of illicit substances and/or alcohol. Patient acknowledges and states understanding. 3: Patient will be given trazodone 50 mg to take 1 to 2 tablets at night as needed for sleep.  4: Patient to work on healthy dietary intake and plenty of water. Discontinue carbonated soft drinks due to excess. 5: Patient to follow-up with me in approximately 6 weeks, or sooner as needed. This will be pending inpatient treatment as discussed. Patient states understanding and agrees with plan. Orders Placed This Encounter    traZODone (DESYREL) 50 mg tablet         ATTENTION:   This medical record was transcribed using an electronic medical records system. Although proofread, it may and can contain electronic and spelling errors. Other human spelling and other errors may be present. Corrections may be executed at a later time. Please feel free to contact us for any clarifications as needed. Follow-up and Dispositions    Return in about 6 weeks (around 2/14/2023) for Follow up. Signed,  Jorge Robbins.  Joe Khoury, MSN APRN FNP-BC

## 2023-01-27 DIAGNOSIS — G47.9 SLEEP DISTURBANCE: ICD-10-CM

## 2023-01-27 RX ORDER — TRAZODONE HYDROCHLORIDE 50 MG/1
50-100 TABLET ORAL
Qty: 60 TABLET | Refills: 1 | Status: SHIPPED | OUTPATIENT
Start: 2023-01-27

## 2023-01-31 DIAGNOSIS — G47.9 SLEEP DISTURBANCE: ICD-10-CM

## 2023-01-31 RX ORDER — TRAZODONE HYDROCHLORIDE 50 MG/1
50-100 TABLET ORAL
Qty: 60 TABLET | Refills: 1 | Status: SHIPPED | OUTPATIENT
Start: 2023-01-31

## 2023-01-31 NOTE — TELEPHONE ENCOUNTER
PCP: Martinez Chinchilla NP    Last appt: 1/3/2023  Future Appointments   Date Time Provider Fredo Mejia   2/23/2023  1:00 PM Martinez Chinchilla NP [facility] AMB       Requested Prescriptions     Pending Prescriptions Disp Refills    traZODone (DESYREL) 50 mg tablet 60 Tablet 1     Sig: Take 1-2 Tablets by mouth nightly.        Prior labs and Blood pressures:  BP Readings from Last 3 Encounters:   01/03/23 132/80   10/24/22 (!) 144/92   09/30/22 136/78     Lab Results   Component Value Date/Time    Sodium 137 09/29/2022 11:18 PM    Potassium 3.5 09/29/2022 11:18 PM    Chloride 104 09/29/2022 11:18 PM    CO2 24 09/29/2022 11:18 PM    Anion gap 9 09/29/2022 11:18 PM    Glucose 137 (H) 09/29/2022 11:18 PM    BUN 11 09/29/2022 11:18 PM    Creatinine 1.14 09/29/2022 11:18 PM    BUN/Creatinine ratio 10 (L) 09/29/2022 11:18 PM    GFR est AA >60 09/29/2022 11:18 PM    GFR est non-AA >60 09/29/2022 11:18 PM    Calcium 9.1 09/29/2022 11:18 PM

## 2023-02-23 ENCOUNTER — OFFICE VISIT (OUTPATIENT)
Dept: INTERNAL MEDICINE CLINIC | Age: 58
End: 2023-02-23

## 2023-02-23 VITALS
BODY MASS INDEX: 29.03 KG/M2 | HEART RATE: 96 BPM | WEIGHT: 207.4 LBS | HEIGHT: 71 IN | DIASTOLIC BLOOD PRESSURE: 76 MMHG | SYSTOLIC BLOOD PRESSURE: 130 MMHG | TEMPERATURE: 98.7 F | OXYGEN SATURATION: 97 % | RESPIRATION RATE: 16 BRPM

## 2023-02-23 DIAGNOSIS — M25.561 PAIN AND SWELLING OF RIGHT KNEE: ICD-10-CM

## 2023-02-23 DIAGNOSIS — E89.0 POST-SURGICAL HYPOTHYROIDISM: ICD-10-CM

## 2023-02-23 DIAGNOSIS — G47.9 SLEEP DISTURBANCE: ICD-10-CM

## 2023-02-23 DIAGNOSIS — R73.03 PREDIABETES: ICD-10-CM

## 2023-02-23 DIAGNOSIS — E78.2 MIXED HYPERLIPIDEMIA: Primary | ICD-10-CM

## 2023-02-23 DIAGNOSIS — F17.200 TOBACCO DEPENDENCE: ICD-10-CM

## 2023-02-23 DIAGNOSIS — J41.0 SMOKERS' COUGH (HCC): ICD-10-CM

## 2023-02-23 DIAGNOSIS — R09.82 PND (POST-NASAL DRIP): ICD-10-CM

## 2023-02-23 DIAGNOSIS — M25.461 PAIN AND SWELLING OF RIGHT KNEE: ICD-10-CM

## 2023-02-23 DIAGNOSIS — J30.89 ENVIRONMENTAL AND SEASONAL ALLERGIES: ICD-10-CM

## 2023-02-23 DIAGNOSIS — F19.21: ICD-10-CM

## 2023-02-23 PROBLEM — F41.0 SEVERE ANXIETY WITH PANIC: Status: RESOLVED | Noted: 2018-12-05 | Resolved: 2023-02-23

## 2023-02-23 PROCEDURE — 99214 OFFICE O/P EST MOD 30 MIN: CPT | Performed by: NURSE PRACTITIONER

## 2023-02-23 RX ORDER — CETIRIZINE HYDROCHLORIDE 10 MG/1
10 TABLET ORAL
Qty: 90 TABLET | Refills: 1 | Status: SHIPPED | OUTPATIENT
Start: 2023-02-23

## 2023-02-23 RX ORDER — TRAZODONE HYDROCHLORIDE 50 MG/1
50 TABLET ORAL
Qty: 90 TABLET | Refills: 1 | Status: SHIPPED | OUTPATIENT
Start: 2023-02-23

## 2023-02-23 NOTE — PROGRESS NOTES
Paige Rainey. is a 65 y.o. male     Chief Complaint   Patient presents with    Thyroid Problem     Follow up         Visit Vitals  /76 (BP 1 Location: Left upper arm, BP Patient Position: Sitting, BP Cuff Size: Adult)   Pulse 96   Temp 98.7 °F (37.1 °C) (Oral)   Resp 16   Ht 5' 11\" (1.803 m)   Wt 207 lb 6.4 oz (94.1 kg)   SpO2 97%   BMI 28.93 kg/m²       Health Maintenance Due   Topic Date Due    Pneumococcal 0-64 years (1 - PCV) Never done    DTaP/Tdap/Td series (1 - Tdap) Never done    Colorectal Cancer Screening Combo  Never done    Shingles Vaccine (1 of 2) Never done    Flu Vaccine (1) Never done    COVID-19 Vaccine (4 - Booster for [employer] [facility] series) 09/02/2022         1. \"Have you been to the ER, urgent care clinic since your last visit? Hospitalized since your last visit? \" No    2. \"Have you seen or consulted any other health care providers outside of the [address] [place] [address] since your last visit? \" No     3. For patients aged 39-70: Has the patient had a colonoscopy / FIT/ Cologuard? No      If the patient is female:    4. For patients aged 41-77: Has the patient had a mammogram within the past 2 years? NA - based on age or sex      11. For patients aged 21-65: Has the patient had a pap smear?  NA - based on age or sex

## 2023-02-23 NOTE — PROGRESS NOTES
Chief Complaint   Patient presents with    Thyroid Problem     Follow up       SUBJECTIVE:    Paul Manuel is a 65 y.o. male who is here today for a follow up appointment regarding current medical conditions including: Mixed hyperlipidemia, postsurgical hypothyroidism, prediabetes, sleep disturbance, seasonal allergies, tobacco dependence with chronic cough, and personal history of drug dependence. The patient states he just was released from completion of a 30-day inpatient rehab for treatment of drug dependence, specifically methamphetamine. He states he has been clean and sober since that time, and is \"feeling much better overall. \"  He states he intends to start going to [employer] meetings soon, and hopes to obtain a sponsor and a nominal period of time. He states his wife has also been going to a family support counseling group which has been helpful. He states that their relationship has improved greatly since his treatment. He is happy about this. He continues to smoke cigarettes on a regular basis, and has a chronic cough. He states he has no production. He had a low-dose CT scan which was clear of any signs of malignancy or abnormality. He does admit to some allergies with sneezing and congestion. He is currently on simvastatin daily for management of his mixed hyperlipidemia and tolerating this well. He is currently on levothyroxine daily and tolerating this well. He continues to use trazodone nightly for management of his sleep. He states the medication is effective and helpful. In addition, he also complains of some swelling to his right knee after feeling/hearing a \"popping sound\" which occurred a couple of weeks ago. He has been using compression as well as elevation to the area. He states it has improved somewhat, but not completely resolved yet. He denies any locking, redness, bruising, or severe pain.     Current Outpatient Medications   Medication Sig Dispense Refill    traZODone (DESYREL) 50 mg tablet Take 1 Tablet by mouth nightly. 90 Tablet 1    cetirizine (ZYRTEC) 10 mg tablet Take 1 Tablet by mouth nightly. 90 Tablet 1    b-complex with vitamin c tablet Take 1 Tablet by mouth daily. Indications: treatment to prevent vitamin deficiency 30 Tablet 0    Omeprazole delayed release (PRILOSEC D/R) 20 mg tablet Take 1 Tablet by mouth nightly. Indications: gastroesophageal reflux disease 30 Tablet 0    simvastatin (ZOCOR) 40 mg tablet Take 1 Tablet by mouth nightly. Indications: high cholesterol 30 Tablet 0    cholecalciferol (VITAMIN D3) (1000 Units /25 mcg) tablet Take 1,000 Units by mouth daily. Indications: prevention of vitamin D deficiency      levothyroxine (SYNTHROID) 175 mcg tablet Take 1 Tablet by mouth Daily (before breakfast). 180 Tablet 3    testosterone cypionate (DEPOTESTOTERONE CYPIONATE) 200 mg/mL injection 100 mg by IntraMUSCular route Once every 2 weeks. multivitamin (ONE A DAY) tablet Take 1 Tab by mouth daily. hydrocortisone (CORTEF) 20 mg tablet Take 20 mg by mouth daily.  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 [address] 8/10/2017    Other ill-defined conditions(799.89)     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, on methadone    Psychiatric disorder     ANXIETY    Severe anxiety with panic 12/5/2018    Thumb pain 8/10/2017    Vitamin D deficiency 8/10/2017     Past Surgical History:   Procedure Laterality Date    HX ORTHOPAEDIC      LEFT ROTATER CUFF REPAIR     HX ORTHOPAEDIC  08/2018    LUMBAR FUSION    HX ORTHOPAEDIC Left     CYST REMOVED FROM HAND    HX OTHER SURGICAL      pylonidal cyst    HX OTHER SURGICAL      penial fracture     Allergies   Allergen Reactions    Iodinated Contrast Media Hives     08/22/22   Patient denies allergy to Iodine Contrast.  Pt given contrast without premeds,  observed for 20 min following, No Reaction Noted.  Pt discharged with instructions to follow up  with MD.    Contrast Dye [Iodine] Hives       REVIEW OF SYSTEMS:                                        POSITIVE= bold text  Negative = regular text    General:                     fever, chills, sweats, generalized weakness, weight loss/gain,                                       loss of appetite   Eyes:                           blurred vision, eye pain, loss of vision, double vision  ENT:                            rhinorrhea, pharyngitis, sneezing  Respiratory:               cough, sputum production, SOB, SALGADO, wheezing, pleuritic pain   Cardiology:                chest pain, palpitations, orthopnea, PND, edema, syncope   Gastrointestinal:       abdominal pain , N/V, diarrhea, dysphagia, constipation, bleeding   Genitourinary:           frequency, urgency, dysuria, hematuria, incontinence   Muskuloskeletal :      arthralgia, myalgia, back pain, joint swelling  Hematology:              easy bruising, nose or gum bleeding, lymphadenopathy   Dermatological:         rash, ulceration, pruritis, color change / jaundice  Endocrine:                 hot flashes or polydipsia   Neurological:             headache, dizziness, confusion, focal weakness, paresthesia,                                      Speech difficulties, memory loss, gait difficulty  Psychological:          Feelings of anxiety, depression, agitation        Social History     Socioeconomic History    Marital status:     Number of children: 2   Tobacco Use Smoking status: Every Day     Packs/day: 1.00     Years: 40.00     Pack years: 40.00     Types: Cigarettes    Smokeless tobacco: Never    Tobacco comments:     LOOKING INTO CHANTIX   Vaping Use    Vaping Use: Never used   Substance and Sexual Activity    Alcohol use: No     Comment: RARELY    Drug use: No     Social Determinants of Health     Financial Resource Strain: Low Risk     Difficulty of Paying Living Expenses: Not hard at all   Food Insecurity: No Food Insecurity    Worried About Running Out of Food in the Last Year: Never true    Ran Out of Food in the Last Year: Never true     Family History   Problem Relation Age of Onset    Cancer Mother         breast    High Cholesterol Mother     Stroke Father     Diabetes Father     High Cholesterol Father     Cancer Sister         breast    High Cholesterol Sister     Cancer Sister         breast    High Cholesterol Sister     Cancer Brother         SKIN CANCER    Other Brother         GOUT    High Cholesterol Brother     Heart Attack Paternal Grandmother     Anesth Problems Neg Hx        OBJECTIVE:     Visit Vitals  /76 (BP 1 Location: Left upper arm, BP Patient Position: Sitting, BP Cuff Size: Adult)   Pulse 96   Temp 98.7 °F (37.1 °C) (Oral)   Resp 16   Ht 5' 11\" (1.803 m)   Wt 207 lb 6.4 oz (94.1 kg)   SpO2 97%   BMI 28.93 kg/m²       Constitutional: He appears well nourished, of stated age, and dressed appropriately. Eyes: Sclera anicteric, PERRLA, EOMI  ENT: Nares clear, moist mucous membranes, cobblestoned oropharynx with postnasal drip. Neck: Supple without lymphadenopathy. Thyroid normal, No JVD or bruits  Respiratory: Clear to ascultation X5, normal inspiratory effort, no adventitious breath sounds. Cardiovascular: Regular rate and rhythm, no rubs or gallops, PMI not displaced, No thrills, no peripheral edema    Musculoskeletal: Mild swelling to right knee. Range of motion intact. .    Integumentary: No unusual rashes or suspicious skin lesions noted.  Neuro: Non-focal exam, A & O X 3.   Psychiatric: Appropriate affect and demeanor, pleasant and cooperative. Patient's thought content and thought processing appear to be within normal limits. ASSESSMENT/PLAN:     ICD-10-CM ICD-9-CM    1. Mixed hyperlipidemia  J65.1 099.7 METABOLIC PANEL, COMPREHENSIVE      LIPID PANEL      2. Post-surgical hypothyroidism  E89.0 244.0 TSH 3RD GENERATION      T4, FREE      3. Smokers' cough ([facility] [address]  J41.0 491.0       4. Tobacco dependence  F17.200 305.1       5. Personal history of drug dependence ([facility]  F19.21 304.93       6. Prediabetes  R73.03 790.29 HEMOGLOBIN A1C WITH EAG      7. Sleep disturbance  G47.9 780.50 traZODone (DESYREL) 50 mg tablet      8. PND (post-nasal drip)  R09.82 784.91 cetirizine (ZYRTEC) 10 mg tablet      9. Environmental and seasonal allergies  J30.89 477.8 cetirizine (ZYRTEC) 10 mg tablet      10. Pain and swelling of right knee  M25.561 719.46     M25.461 719.06         1: Patient to return in 1 to 2 weeks for fasting labs including: CMP, lipid panel, TSH, free T4, and hemoglobin A1c.  2: Patient to continue simvastatin daily for management of mixed hyperlipidemia. Patient tolerating well. 3: Continue trazodone nightly for management of sleep. Patient states improvement with use. 4: Patient to focus on tobacco cessation for health benefits. 5: Patient will be given prescription for Zyrtec 10 mg to take nightly due to symptoms of postnasal drip/cough. 6: Patient to focus on healthy lifestyle management with appropriate dietary intake. Avoid concentrated sweets and excess carbohydrates due to prediabetes. 7: Patient to continue compression of right knee as well as elevation and alternating ice/heat to the area. Avoiding strenuous use. 8: Continue all supplements as desired. 9: Patient to follow-up with me in approximately 3 months, or sooner as needed. Patient states understanding and agrees with plan.     Orders Placed This Encounter HEMOGLOBIN A1C WITH EAG    METABOLIC PANEL, COMPREHENSIVE    LIPID PANEL    traZODone (DESYREL) 50 mg tablet    cetirizine (ZYRTEC) 10 mg tablet         ATTENTION:   This medical record was transcribed using an electronic medical records system. Although proofread, it may and can contain electronic and spelling errors. Other human spelling and other errors may be present. Corrections may be executed at a later time. Please feel free to contact us for any clarifications as needed. Follow-up and Dispositions    Return for Return in 1 to 2 weeks for fasting labs. See me in 3 months fasting. Karla Bocanegra, MSN APRN FNP-BC

## 2023-03-09 DIAGNOSIS — Z12.11 SCREEN FOR COLON CANCER: Primary | ICD-10-CM

## 2023-04-07 ENCOUNTER — TELEPHONE (OUTPATIENT)
Dept: INTERNAL MEDICINE CLINIC | Age: 58
End: 2023-04-07

## 2023-04-21 ENCOUNTER — HOSPITAL ENCOUNTER (OUTPATIENT)
Age: 58
Setting detail: OUTPATIENT SURGERY
Discharge: HOME | End: 2023-04-21
Attending: SURGERY | Admitting: SURGERY
Payer: COMMERCIAL

## 2023-04-21 ENCOUNTER — ANESTHESIA EVENT (OUTPATIENT)
Dept: ENDOSCOPY | Age: 58
End: 2023-04-21
Payer: COMMERCIAL

## 2023-04-21 ENCOUNTER — ANESTHESIA (OUTPATIENT)
Dept: ENDOSCOPY | Age: 58
End: 2023-04-21
Payer: COMMERCIAL

## 2023-04-21 VITALS
RESPIRATION RATE: 21 BRPM | OXYGEN SATURATION: 95 % | DIASTOLIC BLOOD PRESSURE: 90 MMHG | WEIGHT: 199 LBS | HEIGHT: 71 IN | BODY MASS INDEX: 27.86 KG/M2 | SYSTOLIC BLOOD PRESSURE: 128 MMHG | HEART RATE: 89 BPM | TEMPERATURE: 98 F

## 2023-04-21 PROCEDURE — 76060000031 HC ANESTHESIA FIRST 0.5 HR: Performed by: SURGERY

## 2023-04-21 PROCEDURE — 74011000250 HC RX REV CODE- 250: Performed by: ANESTHESIOLOGY

## 2023-04-21 PROCEDURE — 76040000019: Performed by: SURGERY

## 2023-04-21 PROCEDURE — 74011250636 HC RX REV CODE- 250/636: Performed by: ANESTHESIOLOGY

## 2023-04-21 PROCEDURE — 2709999900 HC NON-CHARGEABLE SUPPLY: Performed by: SURGERY

## 2023-04-21 PROCEDURE — 74011250636 HC RX REV CODE- 250/636: Performed by: SURGERY

## 2023-04-21 RX ORDER — ATROPINE SULFATE 0.1 MG/ML
0.5 INJECTION INTRAVENOUS
Status: DISCONTINUED | OUTPATIENT
Start: 2023-04-21 | End: 2023-04-22 | Stop reason: HOSPADM

## 2023-04-21 RX ORDER — LIDOCAINE HYDROCHLORIDE 20 MG/ML
INJECTION, SOLUTION EPIDURAL; INFILTRATION; INTRACAUDAL; PERINEURAL AS NEEDED
Status: DISCONTINUED | OUTPATIENT
Start: 2023-04-21 | End: 2023-04-21 | Stop reason: HOSPADM

## 2023-04-21 RX ORDER — SODIUM CHLORIDE 9 MG/ML
50 INJECTION, SOLUTION INTRAVENOUS CONTINUOUS
Status: DISCONTINUED | OUTPATIENT
Start: 2023-04-21 | End: 2023-04-21 | Stop reason: HOSPADM

## 2023-04-21 RX ORDER — SODIUM CHLORIDE 0.9 % (FLUSH) 0.9 %
5-40 SYRINGE (ML) INJECTION EVERY 8 HOURS
Status: DISCONTINUED | OUTPATIENT
Start: 2023-04-21 | End: 2023-04-24 | Stop reason: HOSPADM

## 2023-04-21 RX ORDER — EPINEPHRINE 0.1 MG/ML
1 INJECTION INTRACARDIAC; INTRAVENOUS
Status: DISCONTINUED | OUTPATIENT
Start: 2023-04-21 | End: 2023-04-22 | Stop reason: HOSPADM

## 2023-04-21 RX ORDER — NALOXONE HYDROCHLORIDE 0.4 MG/ML
0.4 INJECTION, SOLUTION INTRAMUSCULAR; INTRAVENOUS; SUBCUTANEOUS
Status: DISCONTINUED | OUTPATIENT
Start: 2023-04-21 | End: 2023-04-21 | Stop reason: HOSPADM

## 2023-04-21 RX ORDER — PROPOFOL 10 MG/ML
INJECTION, EMULSION INTRAVENOUS AS NEEDED
Status: DISCONTINUED | OUTPATIENT
Start: 2023-04-21 | End: 2023-04-21 | Stop reason: HOSPADM

## 2023-04-21 RX ORDER — DEXTROMETHORPHAN/PSEUDOEPHED 2.5-7.5/.8
1.2 DROPS ORAL
Status: DISCONTINUED | OUTPATIENT
Start: 2023-04-21 | End: 2023-04-24 | Stop reason: HOSPADM

## 2023-04-21 RX ORDER — SODIUM CHLORIDE 0.9 % (FLUSH) 0.9 %
5-40 SYRINGE (ML) INJECTION AS NEEDED
Status: DISCONTINUED | OUTPATIENT
Start: 2023-04-21 | End: 2023-04-24 | Stop reason: HOSPADM

## 2023-04-21 RX ORDER — FLUMAZENIL 0.1 MG/ML
0.2 INJECTION INTRAVENOUS
Status: DISCONTINUED | OUTPATIENT
Start: 2023-04-21 | End: 2023-04-21 | Stop reason: HOSPADM

## 2023-04-21 RX ADMIN — SODIUM CHLORIDE 50 ML/HR: 9 INJECTION, SOLUTION INTRAVENOUS at 13:41

## 2023-04-21 RX ADMIN — LIDOCAINE HYDROCHLORIDE 40 MG: 20 INJECTION, SOLUTION EPIDURAL; INFILTRATION; INTRACAUDAL; PERINEURAL at 13:48

## 2023-04-21 RX ADMIN — PROPOFOL 200 MG: 10 INJECTION, EMULSION INTRAVENOUS at 13:59

## 2023-04-21 NOTE — ANESTHESIA PREPROCEDURE EVALUATION
Relevant Problems   ENDOCRINE   (+) Hypothyroidism   Anesthetic History   No history of anesthetic complications            Review of Systems / Medical History  Patient summary reviewed, nursing notes reviewed and pertinent labs reviewed    Pulmonary          Smoker         Neuro/Psych   Within defined limits           Cardiovascular  Within defined limits                     GI/Hepatic/Renal     GERD           Endo/Other      Hypothyroidism       Other Findings   Comments: Opioid dependence  [place]'s disease           Physical Exam    Airway  Mallampati: II  TM Distance: > 6 cm  Neck ROM: normal range of motion   Mouth opening: Normal     Cardiovascular  Regular rate and rhythm,  S1 and S2 normal,  no murmur, click, rub, or gallop             Dental  No notable dental hx       Pulmonary  Breath sounds clear to auscultation               Abdominal  GI exam deferred       Other Findings            Anesthetic Plan    ASA: 2  Anesthesia type: general          Induction: Intravenous  Anesthetic plan and risks discussed with: Patient              Anesthetic History   No history of anesthetic complications            Review of Systems / Medical History  Patient summary reviewed, nursing notes reviewed and pertinent labs reviewed    Pulmonary          Smoker      Comments: 40 pack years   Neuro/Psych         Psychiatric history    Comments: 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, now off methadone  ANXIETY Cardiovascular              Hyperlipidemia    Exercise tolerance: >4 METS     GI/Hepatic/Renal     GERD: well controlled    Renal disease: stones       Endo/Other      Hypothyroidism  Arthritis    Comments: Endocrine disease (E34.9)  raman's disease  [place]'s disease (HCC) (E27.1)       Other Findings              Physical Exam    Airway  Mallampati: II  TM Distance: 4 - 6 cm  Neck ROM: normal range of motion   Mouth opening: Normal     Cardiovascular  Regular rate and rhythm,  S1 and S2 normal,  no murmur, click, rub, or gallop  Rhythm: regular  Rate: normal         Dental    Dentition: Caps/crowns and Implants     Pulmonary  Breath sounds clear to auscultation               Abdominal  GI exam deferred       Other Findings            Anesthetic Plan    ASA: 3  Anesthesia type: MAC          Induction: Intravenous  Anesthetic plan and risks discussed with: Patient

## 2023-04-21 NOTE — ANESTHESIA POSTPROCEDURE EVALUATION
Procedure(s):  COLONOSCOPY. MAC    Anesthesia Post Evaluation        Patient location during evaluation: PACU  Note status: Adequate. Level of consciousness: responsive to verbal stimuli and sleepy but conscious  Pain management: satisfactory to patient  Airway patency: patent  Anesthetic complications: no  Cardiovascular status: acceptable  Respiratory status: acceptable  Hydration status: acceptable  Comments: +Post-Anesthesia Evaluation and Assessment    Patient: Mary Rosario MRN: [id number]  SSN: [id number]   YOB: 1965  Age: 65 y.o. Sex: male      Cardiovascular Function/Vital Signs    BP (!) 128/90   Pulse 89   Temp 36.7 °C (98 °F)   Resp 21   Ht 5' 11\" (1.803 m)   Wt 90.3 kg (199 lb)   SpO2 95%   BMI 27.75 kg/m²     Patient is status post Procedure(s):  COLONOSCOPY. Nausea/Vomiting: Controlled. Postoperative hydration reviewed and adequate. Pain:  Pain Scale 1: Numeric (0 - 10) (04/21/23 1419)  Pain Intensity 1: 0 (04/21/23 1419)   Managed. Neurological Status: At baseline. Mental Status and Level of Consciousness: Arousable. Pulmonary Status:   O2 Device: None (Room air) (04/21/23 1419)   Adequate oxygenation and airway patent. Complications related to anesthesia: None    Post-anesthesia assessment completed. No concerns. Signed By: Natalie Henry DO    4/21/2023  Post anesthesia nausea and vomiting:  controlled      INITIAL Post-op Vital signs:   Vitals Value Taken Time   /90 04/21/23 1419   Temp 36.7 °C (98 °F) 04/21/23 1412   Pulse 86 04/21/23 1420   Resp 18 04/21/23 1420   SpO2 95 % 04/21/23 1420   Vitals shown include unvalidated device data.

## 2023-04-26 ENCOUNTER — TELEPHONE (OUTPATIENT)
Dept: INTERNAL MEDICINE CLINIC | Age: 58
End: 2023-04-26

## 2023-04-26 NOTE — TELEPHONE ENCOUNTER
Pt has been sick for two weeks. Went to [facility] on 4/17 and they put him on antibiotics and tessalon perles. He states he has not gotten better, and is reporting dry coughing, wheezing, sweats, facial congestion with yellow/green mucous, bad taste in mouth. Pt was covid/flu/rsv negative at [facility].     Patient is requesting additional medication to help with the wheezing and coughing. He reports he's short of breath.

## 2023-04-27 ENCOUNTER — OFFICE VISIT (OUTPATIENT)
Dept: INTERNAL MEDICINE CLINIC | Age: 58
End: 2023-04-27

## 2023-04-27 VITALS
WEIGHT: 203 LBS | OXYGEN SATURATION: 98 % | DIASTOLIC BLOOD PRESSURE: 76 MMHG | SYSTOLIC BLOOD PRESSURE: 124 MMHG | HEART RATE: 85 BPM | HEIGHT: 71 IN | RESPIRATION RATE: 16 BRPM | BODY MASS INDEX: 28.42 KG/M2 | TEMPERATURE: 98 F

## 2023-04-27 DIAGNOSIS — J06.9 UPPER RESPIRATORY TRACT INFECTION, UNSPECIFIED TYPE: Primary | ICD-10-CM

## 2023-04-27 DIAGNOSIS — R05.1 ACUTE COUGH: ICD-10-CM

## 2023-04-27 DIAGNOSIS — R06.2 WHEEZING: ICD-10-CM

## 2023-04-27 DIAGNOSIS — R06.89 ABNORMAL BREATH SOUNDS: ICD-10-CM

## 2023-04-27 DIAGNOSIS — R06.02 SOB (SHORTNESS OF BREATH): ICD-10-CM

## 2023-04-27 DIAGNOSIS — J98.01 BRONCHOSPASM: ICD-10-CM

## 2023-04-27 RX ORDER — ALBUTEROL SULFATE 90 UG/1
1-2 AEROSOL, METERED RESPIRATORY (INHALATION)
Qty: 6.7 G | Refills: 0 | Status: SHIPPED | OUTPATIENT
Start: 2023-04-27

## 2023-04-27 RX ORDER — LEVOFLOXACIN 750 MG/1
750 TABLET ORAL DAILY
Qty: 7 TABLET | Refills: 0 | Status: SHIPPED | OUTPATIENT
Start: 2023-04-27

## 2023-04-27 RX ORDER — PREDNISONE 20 MG/1
TABLET ORAL
Qty: 20 TABLET | Refills: 0 | Status: SHIPPED | OUTPATIENT
Start: 2023-04-27

## 2023-04-27 NOTE — PROGRESS NOTES
Derrick Hidalgo. is a 65 y.o. male     Chief Complaint   Patient presents with    Cold Symptoms     Cough and congestion started 2 wks ago. Visit Vitals  /76 (BP 1 Location: Left upper arm, BP Patient Position: Sitting, BP Cuff Size: Adult)   Pulse 85   Temp 98 °F (36.7 °C) (Oral)   Resp 16   Ht 5' 11\" (1.803 m)   Wt 203 lb (92.1 kg)   SpO2 98%   BMI 28.31 kg/m²       Health Maintenance Due   Topic Date Due    Pneumococcal 0-64 years (1 - PCV) Never done    DTaP/Tdap/Td series (1 - Tdap) Never done    Shingles Vaccine (1 of 2) Never done    COVID-19 Vaccine (4 - Booster for Pfizer series) 09/02/2022         1. \"Have you been to the ER, urgent care clinic since your last visit? Hospitalized since your last visit? \"  Yes seen at [facility] on 4/17/23     2. \"Have you seen or consulted any other health care providers outside of the [address] [place] [address] since your last visit? \" No     3. For patients aged 39-70: Has the patient had a colonoscopy / FIT/ Cologuard? Yes - no Care Gap present      If the patient is female:    4. For patients aged 41-77: Has the patient had a mammogram within the past 2 years? NA - based on age or sex      11. For patients aged 21-65: Has the patient had a pap smear?  NA - based on age or sex

## 2023-04-27 NOTE — PROGRESS NOTES
Chief Complaint   Patient presents with    Cold Symptoms     Cough and congestion started 2 wks ago. SUBJECTIVE:    Kira De La Cruz is a 65 y.o. male who is here today with complaints of ongoing severe cough and gradually worsening shortness of breath for the past 2 weeks. The patient states he was seen at urgent care approximately 10 days ago for evaluation of his symptoms. He states he had testing done for COVID and flu which were both negative. He states he was given benzonatate capsules to use as needed which have not been helpful. He states his symptoms have continued to worsen since then. He denies any fever, but does have quite a bit of dyspnea on exertion. He states he is taking Mucinex, but has had no production with his cough. He states he can hear himself wheezing. He denies any chest pain or chest pressure. Current Outpatient Medications   Medication Sig Dispense Refill    levoFLOXacin (LEVAQUIN) 750 mg tablet Take 1 Tablet by mouth daily. 7 Tablet 0    predniSONE (DELTASONE) 20 mg tablet Take 3 tablets by mouth each day x3 days, then 2 tablets by mouth daily x3 days, then 1 tablet daily by mouth x5 days. 20 Tablet 0    albuterol (PROVENTIL HFA, VENTOLIN HFA, PROAIR HFA) 90 mcg/actuation inhaler Take 1-2 Puffs by inhalation every four (4) hours as needed for Wheezing or Shortness of Breath. 6.7 g 0    b-complex with vitamin c tablet Take 1 Tablet by mouth daily. Indications: treatment to prevent vitamin deficiency 30 Tablet 0    Omeprazole delayed release (PRILOSEC D/R) 20 mg tablet Take 1 Tablet by mouth nightly. Indications: gastroesophageal reflux disease 30 Tablet 0    simvastatin (ZOCOR) 40 mg tablet Take 1 Tablet by mouth nightly. Indications: high cholesterol 30 Tablet 0    levothyroxine (SYNTHROID) 175 mcg tablet Take 1 Tablet by mouth Daily (before breakfast).  180 Tablet 3    testosterone cypionate (DEPOTESTOTERONE CYPIONATE) 200 mg/mL injection 0.5 mL by IntraMUSCular route Once every 2 weeks. Indications: deficiency of a substance that promotes masculinization      multivitamin (ONE A DAY) tablet Take 1 Tablet by mouth daily. Indications: treatment to prevent vitamin deficiency      hydrocortisone (CORTEF) 20 mg tablet Take 1 Tablet by mouth daily. Indications: Hanson's disease      traZODone (DESYREL) 50 mg tablet Take 1 Tablet by mouth nightly. (Patient not taking: Reported on 4/27/2023) 90 Tablet 1    cetirizine (ZYRTEC) 10 mg tablet Take 1 Tablet by mouth nightly. (Patient not taking: Reported on 4/21/2023) 90 Tablet 1    cholecalciferol (VITAMIN D3) (1000 Units /25 mcg) tablet Take 1 Tablet by mouth daily.  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[address]     Arthritis     Autoimmune disease ([facility] [address]     YESSICA'S DISEASE    Chest pain 8/22/2017    Chronic back pain 8/13/2017    Chronic insomnia 8/10/2017    Difficulty in urination 8/10/2017    Endocrine disease     yessica's disease    GERD (gastroesophageal reflux disease)     Hypercholesteremia     Hypogonadism male 8/10/2017    Hypothyroidism 8/10/2017    ADDISONS DISEASE    Kidney stones     multiple, has had lithotripsey as well as passed on his own    Low testosterone 8/10/2018    Opioid dependence ([facility] [address] 8/10/2017    Other ill-defined conditions(799.89)     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, on methadone    Psychiatric disorder     ANXIETY    Severe anxiety with panic 12/5/2018    Thumb pain 8/10/2017    Vitamin D deficiency 8/10/2017     Past Surgical History:   Procedure Laterality Date    COLONOSCOPY N/A 4/21/2023    COLONOSCOPY performed by Tala Grimes MD at [facility]     HX ORTHOPAEDIC  08/2018    LUMBAR FUSION    HX ORTHOPAEDIC Left     CYST REMOVED FROM HAND    HX OTHER SURGICAL      pylonidal cyst    HX OTHER SURGICAL      penial fracture HX THYROIDECTOMY       Allergies   Allergen Reactions    Iodinated Contrast Media Hives     08/22/22   Patient denies allergy to Iodine Contrast.  Pt given contrast without premeds,  observed for 20 min following, No Reaction Noted.  Pt discharged with instructions to follow up  with MD.    Contrast Dye [Iodine] Hives       REVIEW OF SYSTEMS:                                        POSITIVE= bold text  Negative = regular text    General:                     fever, chills, sweats, generalized weakness, weight loss/gain,                                       loss of appetite   Eyes:                           blurred vision, eye pain, loss of vision, double vision  ENT:                            rhinorrhea, pharyngitis   Respiratory:               cough, sputum production, SOB, SALGADO, wheezing, pleuritic pain   Cardiology:                chest pain, palpitations, orthopnea, PND, edema, syncope   Gastrointestinal:       abdominal pain , N/V, diarrhea, dysphagia, constipation, bleeding   Genitourinary:           frequency, urgency, dysuria, hematuria, incontinence   Muskuloskeletal :      arthralgia, myalgia, back pain  Hematology:              easy bruising, nose or gum bleeding, lymphadenopathy   Dermatological:         rash, ulceration, pruritis, color change / jaundice  Endocrine:                 hot flashes or polydipsia   Neurological:             headache, dizziness, confusion, focal weakness, paresthesia,                                      Speech difficulties, memory loss, gait difficulty  Psychological:          Feelings of anxiety, depression, agitation        Social History     Socioeconomic History    Marital status:     Number of children: 2   Tobacco Use    Smoking status: Every Day     Packs/day: 1.00     Years: 40.00     Pack years: 40.00     Types: Cigarettes    Smokeless tobacco: Never    Tobacco comments:     LOOKING INTO CHANTIX   Vaping Use    Vaping Use: Never used   Substance and Sexual Activity    Alcohol use: No     Comment: RARELY    Drug use: No     Social Determinants of Health     Financial Resource Strain: Low Risk     Difficulty of Paying Living Expenses: Not hard at all   Food Insecurity: No Food Insecurity    Worried About Running Out of Food in the Last Year: Never true    Ran Out of Food in the Last Year: Never true     Family History   Problem Relation Age of Onset    Cancer Mother         breast    High Cholesterol Mother     Stroke Father     Diabetes Father     High Cholesterol Father     Cancer Sister         breast    High Cholesterol Sister     Cancer Sister         breast    High Cholesterol Sister         colon    Cancer Brother         SKIN CANCER    Other Brother         GOUT    High Cholesterol Brother     Heart Attack Paternal Grandmother     Anesth Problems Neg Hx        OBJECTIVE:     Visit Vitals  /76 (BP 1 Location: Left upper arm, BP Patient Position: Sitting, BP Cuff Size: Adult)   Pulse 85   Temp 98 °F (36.7 °C) (Oral)   Resp 16   Ht 5' 11\" (1.803 m)   Wt 203 lb (92.1 kg)   SpO2 98%   BMI 28.31 kg/m²       Constitutional: He appears well nourished, of stated age, and dressed appropriately. Eyes: Sclera anicteric, PERRLA, EOMI  ENT: Nares clear, moist mucous membranes, pharynx clear  Respiratory: Turbulent with faint inspiratory wheeze to ascultation X5, normal inspiratory effort, no egophony. Positive for whispered pectoriloquy   Cardiovascular: Regular rate and rhythm, no rubs or gallops, PMI not displaced, No thrills, no peripheral edema    ASSESSMENT/PLAN:     ICD-10-CM ICD-9-CM    1. Upper respiratory tract infection, unspecified type  J06.9 465.9 levoFLOXacin (LEVAQUIN) 750 mg tablet      2. Acute cough  R05.1 786.2 XR CHEST PA LAT      levoFLOXacin (LEVAQUIN) 750 mg tablet      3. Bronchospasm  J98.01 519.11 predniSONE (DELTASONE) 20 mg tablet      albuterol (PROVENTIL HFA, VENTOLIN HFA, PROAIR HFA) 90 mcg/actuation inhaler      4.  Abnormal breath sounds  R06.89 786.7 levoFLOXacin (LEVAQUIN) 750 mg tablet      predniSONE (DELTASONE) 20 mg tablet      5. Wheezing  R06.2 786.07 predniSONE (DELTASONE) 20 mg tablet      albuterol (PROVENTIL HFA, VENTOLIN HFA, PROAIR HFA) 90 mcg/actuation inhaler      6. SOB (shortness of breath)  R06.02 786.05 predniSONE (DELTASONE) 20 mg tablet      albuterol (PROVENTIL HFA, VENTOLIN HFA, PROAIR HFA) 90 mcg/actuation inhaler        1: Chest x-ray performed in office today. We will await radiology evaluation. 2: Patient will be given prescriptions for Levaquin 750 mg x 7 days. Prednisone 20 mg to take as directed. Albuterol inhaler to use as directed. 3: Patient counseled to avoid smoking due to pulmonary irritation. 4: May continue Mucinex as desired. 5: Patient may use DayQuil/NyQuil as desired. 6: Continue to hydrate well with water. 7: Follow-up with me if symptoms not improving. Patient states understanding and agrees with with plan. Orders Placed This Encounter    XR CHEST PA LAT    levoFLOXacin (LEVAQUIN) 750 mg tablet    predniSONE (DELTASONE) 20 mg tablet    albuterol (PROVENTIL HFA, VENTOLIN HFA, PROAIR HFA) 90 mcg/actuation inhaler         ATTENTION:   This medical record was transcribed using an electronic medical records system. Although proofread, it may and can contain electronic and spelling errors. Other human spelling and other errors may be present. Corrections may be executed at a later time. Please feel free to contact us for any clarifications as needed. Signed,  Tamera Hernandez.  Flaquita Hicks, MSN APRN FNP-BC

## 2023-05-04 DIAGNOSIS — G47.9 SLEEP DISTURBANCE: ICD-10-CM

## 2023-05-04 RX ORDER — TRAZODONE HYDROCHLORIDE 50 MG/1
50 TABLET ORAL
Qty: 90 TABLET | Refills: 1 | Status: SHIPPED | OUTPATIENT
Start: 2023-05-04

## 2023-05-26 ENCOUNTER — ANESTHESIA (OUTPATIENT)
Facility: HOSPITAL | Age: 58
End: 2023-05-26
Payer: COMMERCIAL

## 2023-05-26 ENCOUNTER — ANESTHESIA EVENT (OUTPATIENT)
Facility: HOSPITAL | Age: 58
End: 2023-05-26
Payer: COMMERCIAL

## 2023-05-26 ENCOUNTER — HOSPITAL ENCOUNTER (OUTPATIENT)
Facility: HOSPITAL | Age: 58
Setting detail: OUTPATIENT SURGERY
Discharge: HOME | End: 2023-05-26
Attending: SURGERY | Admitting: SURGERY
Payer: COMMERCIAL

## 2023-05-26 VITALS
DIASTOLIC BLOOD PRESSURE: 92 MMHG | OXYGEN SATURATION: 94 % | SYSTOLIC BLOOD PRESSURE: 133 MMHG | BODY MASS INDEX: 28.11 KG/M2 | HEIGHT: 71 IN | TEMPERATURE: 97.9 F | WEIGHT: 200.8 LBS | HEART RATE: 72 BPM | RESPIRATION RATE: 20 BRPM

## 2023-05-26 PROCEDURE — 3700000000 HC ANESTHESIA ATTENDED CARE: Performed by: SURGERY

## 2023-05-26 PROCEDURE — 88305 TISSUE EXAM BY PATHOLOGIST: CPT

## 2023-05-26 PROCEDURE — 2580000003 HC RX 258: Performed by: NURSE ANESTHETIST, CERTIFIED REGISTERED

## 2023-05-26 PROCEDURE — 3600007512: Performed by: SURGERY

## 2023-05-26 PROCEDURE — 2500000003 HC RX 250 WO HCPCS: Performed by: NURSE ANESTHETIST, CERTIFIED REGISTERED

## 2023-05-26 PROCEDURE — 3600007502: Performed by: SURGERY

## 2023-05-26 PROCEDURE — 3700000001 HC ADD 15 MINUTES (ANESTHESIA): Performed by: SURGERY

## 2023-05-26 PROCEDURE — C1889 IMPLANT/INSERT DEVICE, NOC: HCPCS | Performed by: SURGERY

## 2023-05-26 PROCEDURE — 7100000010 HC PHASE II RECOVERY - FIRST 15 MIN: Performed by: SURGERY

## 2023-05-26 PROCEDURE — 6360000002 HC RX W HCPCS: Performed by: NURSE ANESTHETIST, CERTIFIED REGISTERED

## 2023-05-26 PROCEDURE — 2720000010 HC SURG SUPPLY STERILE: Performed by: SURGERY

## 2023-05-26 PROCEDURE — 2709999900 HC NON-CHARGEABLE SUPPLY: Performed by: SURGERY

## 2023-05-26 RX ORDER — SODIUM CHLORIDE 9 MG/ML
25 INJECTION, SOLUTION INTRAVENOUS PRN
Status: CANCELLED | OUTPATIENT
Start: 2023-05-26

## 2023-05-26 RX ORDER — SODIUM CHLORIDE 0.9 % (FLUSH) 0.9 %
5-40 SYRINGE (ML) INJECTION PRN
Status: CANCELLED | OUTPATIENT
Start: 2023-05-26

## 2023-05-26 RX ORDER — SODIUM CHLORIDE 0.9 % (FLUSH) 0.9 %
5-40 SYRINGE (ML) INJECTION EVERY 12 HOURS SCHEDULED
Status: CANCELLED | OUTPATIENT
Start: 2023-05-26

## 2023-05-26 RX ORDER — LIDOCAINE HYDROCHLORIDE 20 MG/ML
INJECTION, SOLUTION EPIDURAL; INFILTRATION; INTRACAUDAL; PERINEURAL PRN
Status: DISCONTINUED | OUTPATIENT
Start: 2023-05-26 | End: 2023-05-26 | Stop reason: SDUPTHER

## 2023-05-26 RX ORDER — 0.9 % SODIUM CHLORIDE 0.9 %
INTRAVENOUS SOLUTION INTRAVENOUS CONTINUOUS PRN
Status: DISCONTINUED | OUTPATIENT
Start: 2023-05-26 | End: 2023-05-26 | Stop reason: SDUPTHER

## 2023-05-26 RX ADMIN — PROPOFOL 100 MG: 10 INJECTION, EMULSION INTRAVENOUS at 09:02

## 2023-05-26 RX ADMIN — PROPOFOL 50 MG: 10 INJECTION, EMULSION INTRAVENOUS at 09:13

## 2023-05-26 RX ADMIN — PROPOFOL 50 MG: 10 INJECTION, EMULSION INTRAVENOUS at 09:08

## 2023-05-26 RX ADMIN — SODIUM CHLORIDE 2 ML/KG/HR: 9 INJECTION, SOLUTION INTRAVENOUS at 08:50

## 2023-05-26 RX ADMIN — LIDOCAINE HYDROCHLORIDE 40 MG: 20 INJECTION, SOLUTION EPIDURAL; INFILTRATION; INTRACAUDAL; PERINEURAL at 09:02

## 2023-05-26 ASSESSMENT — PAIN - FUNCTIONAL ASSESSMENT: PAIN_FUNCTIONAL_ASSESSMENT: NONE - DENIES PAIN

## 2023-05-26 ASSESSMENT — LIFESTYLE VARIABLES: SMOKING_STATUS: 1

## 2023-05-26 NOTE — ANESTHESIA PRE PROCEDURE
Department of Anesthesiology  Preprocedure Note       Name:  Myrna John. Age:  65 y.o.  :  1965                                          MRN:  [id number]         Date:  2023      Surgeon: Mago Donohue):  Gillian Jacobs MD    Procedure: Procedure(s):  COLONOSCOPY    Medications prior to admission:   Prior to Admission medications    Medication Sig Start Date End Date Taking? Authorizing Provider   cetirizine (ZYRTEC) 10 MG tablet Take 10 mg by mouth 23   Ar Automatic Reconciliation   vitamin D (CHOLECALCIFEROL) 25 MCG (1000 UT) TABS tablet Take 1,000 Units by mouth daily    Ar Automatic Reconciliation   hydrocortisone (CORTEF) 20 MG tablet Take 20 mg by mouth daily    Ar Automatic Reconciliation   levothyroxine (SYNTHROID) 175 MCG tablet Take 175 mcg by mouth every morning (before breakfast) 6/10/22   Ar Automatic Reconciliation   omeprazole (PRILOSEC OTC) 20 MG tablet Take 20 mg by mouth 10/6/22   Ar Automatic Reconciliation   simvastatin (ZOCOR) 40 MG tablet Take 40 mg by mouth 10/6/22   Ar Automatic Reconciliation   testosterone cypionate (DEPOTESTOTERONE CYPIONATE) 200 MG/ML injection Inject 100 mg into the muscle. Ar Automatic Reconciliation   traZODone (DESYREL) 50 MG tablet Take 50 mg by mouth 23   Ar Automatic Reconciliation       Current medications:    No current facility-administered medications for this encounter. Allergies: Allergies   Allergen Reactions    Iodinated Contrast Media Hives     22   Patient denies allergy to Iodine Contrast.  Pt given contrast without premeds,  observed for 20 min following, No Reaction Noted.  Pt discharged with instructions to follow up  with MD.   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

## 2023-05-26 NOTE — H&P
Social History     Tobacco Use    Smoking status: Every Day     Packs/day: 1.00     Types: Cigarettes    Smokeless tobacco: Never   Substance Use Topics    Alcohol use: No      Prior to Admission medications    Medication Sig Start Date End Date Taking? Authorizing Provider   cetirizine (ZYRTEC) 10 MG tablet Take 1 tablet by mouth 2/23/23   Ar Automatic Reconciliation   vitamin D (CHOLECALCIFEROL) 25 MCG (1000 UT) TABS tablet Take 1 tablet by mouth daily    Ar Automatic Reconciliation   hydrocortisone (CORTEF) 20 MG tablet Take 1 tablet by mouth daily    Ar Automatic Reconciliation   levothyroxine (SYNTHROID) 175 MCG tablet Take 1 tablet by mouth every morning (before breakfast) 6/10/22   Ar Automatic Reconciliation   omeprazole (PRILOSEC OTC) 20 MG tablet Take 1 tablet by mouth 10/6/22   Ar Automatic Reconciliation   simvastatin (ZOCOR) 40 MG tablet Take 1 tablet by mouth 10/6/22   Ar Automatic Reconciliation   testosterone cypionate (DEPOTESTOTERONE CYPIONATE) 200 MG/ML injection Inject 0.5 mLs into the muscle. Ar Automatic Reconciliation   traZODone (DESYREL) 50 MG tablet Take 1 tablet by mouth 2/23/23   Ar Automatic Reconciliation        Allergies   Allergen Reactions    Iodinated Contrast Media Hives     08/22/22   Patient denies allergy to Iodine Contrast.  Pt given contrast without premeds,  observed for 20 min following, No Reaction Noted. Pt discharged with instructions to follow up  with MD.    Iodine Hives       Review of Systems:  A comprehensive review of systems was negative except for that written in the History of Present Illness. Objective:     Vitals:    05/26/23 0834   BP: (!) 140/102   Pulse: 77   Resp: 20   Temp: 98 °F (36.7 °C)   TempSrc: Temporal   SpO2: 97%   Weight: 91.1 kg (200 lb 12.8 oz)   Height: 1.803 m (5' 11\")        Physical Exam:  General:  Alert, cooperative, no distress, appears stated age. Eyes:  Conjunctivae/corneas clear. PERRL, EOMs intact.  Fundi benign   Ears:

## 2023-05-26 NOTE — ANESTHESIA POSTPROCEDURE EVALUATION
Department of Anesthesiology  Postprocedure Note    Patient: Jose Luis Clemente   MRN: [id number]  YOB: 1965  Date of evaluation: 5/26/2023      Procedure Summary     Date: 05/26/23 Room / Location: [facility] ENDO 01 / [facility] ENDOSCOPY    Anesthesia Start: 7137 Anesthesia Stop: Joaquin Board    Procedure: COLONOSCOPY, POLYPECTOMY (Lower GI Region) Diagnosis:       Special screening for malignant neoplasms, colon      (Special screening for malignant neoplasms, colon [Z12.11])    Surgeons: Yee Henriquez MD Responsible Provider: Leanne Simental MD    Anesthesia Type: MAC ASA Status: 2          Anesthesia Type: MAC    Mary Phase I: Mary Score: 10    Mary Phase II: Mary Score: 10      Anesthesia Post Evaluation    Patient location during evaluation: PACU  Patient participation: complete - patient participated  Level of consciousness: sleepy but conscious and responsive to verbal stimuli  Airway patency: patent  Nausea & Vomiting: no vomiting and no nausea  Complications: no  Cardiovascular status: blood pressure returned to baseline and hemodynamically stable  Respiratory status: acceptable  Hydration status: stable

## 2023-05-26 NOTE — PROCEDURES
Colonoscopy Procedure Note    Zuleika Mathis  1965  [id number]    Indications:    Screening colonoscopy     Pre-operative Diagnosis: Special screening for malignant neoplasms, colon [Z12.11]    Post-operative Diagnosis: * No post-op diagnosis entered *    : Helen Montenegro MD    Referring Provider: BREANNA Adkins NP    Sedation:  MAC anesthesia Propofol    Procedure Details:    After informed consent was obtained with all risks and benefits of procedure explained and preoperative exam completed, the patient was taken to the endoscopy suite and placed in the left lateral decubitus position. Upon sequential sedation as per above, a digital rectal exam was performed  And was normal.  The Olympus videocolonoscope  was inserted in the rectum and carefully advanced to the cecum, which was identified by the ileocecal valve and appendiceal orifice. The quality of preparation was good. The colonoscope was slowly withdrawn with careful evaluation between folds. Retroflexion in the rectum was performed and was normal..     Findings:   Rectum: normal  Sigmoid: normal  Descending Colon: normal  Transverse Colon: normal  Ascending Colon: normal  Cecum: 8mm sessile polyp. Removed and retrieved with hot snare. Terminal Ileum: not intubated    Therapies:  Polypectomy    Specimen:   polyp    Complications: None. EBL:  Minimal    Recommendations: -Repeat colonoscopy in 5 years.       Helen Montenegro MD  5/26/2023  9:22 AM

## 2023-05-26 NOTE — DISCHARGE INSTRUCTIONS
Romie Durham  [id number]  1965    COLON DISCHARGE INSTRUCTIONS  Discomfort:  Redness at IV site- apply warm compress to area; if redness or soreness persist- contact your physician  There may be a slight amount of blood passed from the rectum  Gaseous discomfort- walking, belching will help relieve any discomfort  You may not operate a vehicle for 12 hours  You may not engage in an occupation involving machinery or appliances for rest of today  You may not drink alcoholic beverages for at least 12 hours  Avoid making any critical decisions for at least 24 hour  DIET:   Regular diet. - however -  remember your colon is empty and a heavy meal will produce gas. Avoid these foods:  vegetables, fried / greasy foods, carbonated drinks for today    MEDICATIONS:  [unfilled]     ACTIVITY:  You may resume your normal daily activities it is recommended that you spend the remainder of the day resting -  avoid any strenuous activity. CALL M.D. ANY SIGN OF:   Increasing pain, nausea, vomiting  Abdominal distension (swelling)  New increased bleeding (oral or rectal)  Fever (chills)  Pain in chest area  Bloody discharge from nose or mouth  Shortness of breath     Follow-up Instructions:   Call René Benjamin MD if any questions or problems. Telephone # [phone]  Biopsy results will be available in  7 to10 days  Should have a repeat colonoscopy in 5 years. COLONOSCOPY FINDINGS:  Your colonoscopy showed: 1 polyp (removed).       Patient Education on Sedation / Analgesia Administered for Procedure      For 24 hours after general anesthesia or intravenous analgesia / sedation:  Have someone responsible help you with your care  Limit your activities  Do not drive and operate hazardous machinery  Do not make important personal, legal or business decisions  Do not drink alcoholic beverages  If you have not urinated within 8 hours after discharge, please contact your physician  Resume your medications unless

## 2023-06-06 ENCOUNTER — OFFICE VISIT (OUTPATIENT)
Facility: CLINIC | Age: 58
End: 2023-06-06
Payer: COMMERCIAL

## 2023-06-06 VITALS
HEART RATE: 83 BPM | HEIGHT: 71 IN | OXYGEN SATURATION: 95 % | DIASTOLIC BLOOD PRESSURE: 84 MMHG | SYSTOLIC BLOOD PRESSURE: 136 MMHG | BODY MASS INDEX: 28.81 KG/M2 | WEIGHT: 205.8 LBS | RESPIRATION RATE: 16 BRPM | TEMPERATURE: 98.1 F

## 2023-06-06 DIAGNOSIS — J41.0 SMOKERS' COUGH (HCC): ICD-10-CM

## 2023-06-06 DIAGNOSIS — J41.0 SIMPLE CHRONIC BRONCHITIS (HCC): ICD-10-CM

## 2023-06-06 DIAGNOSIS — R06.81 WITNESSED EPISODE OF APNEA: ICD-10-CM

## 2023-06-06 DIAGNOSIS — Z79.899 ON STATIN THERAPY: ICD-10-CM

## 2023-06-06 DIAGNOSIS — R40.0 DAYTIME SOMNOLENCE: ICD-10-CM

## 2023-06-06 DIAGNOSIS — Z00.00 ROUTINE PHYSICAL EXAMINATION: Primary | ICD-10-CM

## 2023-06-06 DIAGNOSIS — R06.83 LOUD SNORING: ICD-10-CM

## 2023-06-06 DIAGNOSIS — F51.01 PRIMARY INSOMNIA: ICD-10-CM

## 2023-06-06 DIAGNOSIS — E78.2 MIXED HYPERLIPIDEMIA: ICD-10-CM

## 2023-06-06 DIAGNOSIS — R73.03 PREDIABETES: ICD-10-CM

## 2023-06-06 DIAGNOSIS — E89.0 POSTPROCEDURAL HYPOTHYROIDISM: ICD-10-CM

## 2023-06-06 DIAGNOSIS — F17.200 TOBACCO DEPENDENCE: ICD-10-CM

## 2023-06-06 DIAGNOSIS — Z12.5 SCREENING FOR MALIGNANT NEOPLASM OF PROSTATE: ICD-10-CM

## 2023-06-06 PROCEDURE — 99396 PREV VISIT EST AGE 40-64: CPT | Performed by: NURSE PRACTITIONER

## 2023-06-06 RX ORDER — TRAZODONE HYDROCHLORIDE 50 MG/1
50 TABLET ORAL NIGHTLY
Qty: 90 TABLET | Refills: 1 | Status: SHIPPED | OUTPATIENT
Start: 2023-06-06

## 2023-06-06 SDOH — ECONOMIC STABILITY: INCOME INSECURITY: HOW HARD IS IT FOR YOU TO PAY FOR THE VERY BASICS LIKE FOOD, HOUSING, MEDICAL CARE, AND HEATING?: NOT HARD AT ALL

## 2023-06-06 SDOH — ECONOMIC STABILITY: FOOD INSECURITY: WITHIN THE PAST 12 MONTHS, YOU WORRIED THAT YOUR FOOD WOULD RUN OUT BEFORE YOU GOT MONEY TO BUY MORE.: NEVER TRUE

## 2023-06-06 SDOH — ECONOMIC STABILITY: FOOD INSECURITY: WITHIN THE PAST 12 MONTHS, THE FOOD YOU BOUGHT JUST DIDN'T LAST AND YOU DIDN'T HAVE MONEY TO GET MORE.: NEVER TRUE

## 2023-06-06 SDOH — ECONOMIC STABILITY: HOUSING INSECURITY
IN THE LAST 12 MONTHS, WAS THERE A TIME WHEN YOU DID NOT HAVE A STEADY PLACE TO SLEEP OR SLEPT IN A SHELTER (INCLUDING NOW)?: NO

## 2023-06-06 NOTE — PROGRESS NOTES
Eliazar Delgado. is a 65 y.o. male     Chief Complaint   Patient presents with    Annual Exam       /84 (Site: Left Upper Arm, Position: Sitting, Cuff Size: Large Adult)   Pulse 83   Temp 98.1 °F (36.7 °C) (Oral)   Resp 16   Ht 5' 11\" (1.803 m)   Wt 205 lb 12.8 oz (93.4 kg)   SpO2 95%   BMI 28.70 kg/m²     Health Maintenance Due   Topic Date Due    Pneumococcal 0-64 years Vaccine (1 - PCV) Never done    HIV screen  Never done    DTaP/Tdap/Td vaccine (1 - Tdap) Never done    Shingles vaccine (1 of 2) Never done    COVID-19 Vaccine (4 - Booster for Pfizer series) 09/02/2022         1. \"Have you been to the ER, urgent care clinic since your last visit? Hospitalized since your last visit? \" No     2. \"Have you seen or consulted any other health care providers outside of the [address] [place] [address] since your last visit? \" No      3. For patients aged 39-70: Has the patient had a colonoscopy / FIT/ Cologuard? Yes      If the patient is female:    4. For patients aged 41-77: Has the patient had a mammogram within the past 2 years? N/A      5. For patients aged 21-65: Has the patient had a pap smear?  N/A
Every Day     Packs/day: 1.00     Years: 40.00     Pack years: 40.00     Types: Cigarettes     Passive exposure: Current    Smokeless tobacco: Never   Vaping Use    Vaping Use: Former   Substance and Sexual Activity    Alcohol use: No    Drug use: No    Sexual activity: Yes     Partners: Female     Social Determinants of Health     Financial Resource Strain: Low Risk     Difficulty of Paying Living Expenses: Not hard at all   Food Insecurity: No Food Insecurity    Worried About Running Out of Food in the Last Year: Never true    Ran Out of Food in the Last Year: Never true   Transportation Needs: Unknown    Lack of Transportation (Non-Medical): No   Housing Stability: Unknown    Unstable Housing in the Last Year: No     Family History   Problem Relation Age of Onset    Stroke Father     High Cholesterol Brother     Other Brother         GOUT    Cancer Brother         SKIN CANCER    High Cholesterol Sister         colon    Cancer Sister         breast    High Cholesterol Sister     Anesth Problems Neg Hx     Cancer Mother         breast    High Cholesterol Mother     Heart Attack Paternal Grandmother     Diabetes Father     High Cholesterol Father     Cancer Sister         breast       OBJECTIVE:     /84 (Site: Left Upper Arm, Position: Sitting, Cuff Size: Large Adult)   Pulse 83   Temp 98.1 °F (36.7 °C) (Oral)   Resp 16   Ht 5' 11\" (1.803 m)   Wt 205 lb 12.8 oz (93.4 kg)   SpO2 95%   BMI 28.70 kg/m²     Constitutional: He appears well nourished, of stated age, and dressed appropriately. Eyes: Sclera anicteric, PERRLA, EOMI  ENT: Nares clear, moist mucous membranes, pharynx clear. Postnasal drip present. Neck: Supple without lymphadenopathy. No JVD or bruits  Respiratory: Clear to ascultation X5, normal inspiratory effort, no adventitious breath sounds.   Cardiovascular: Regular rate and rhythm, no murmurs, rubs or gallops, PMI not displaced, No thrills, no peripheral edema  Gastrointestinal: Abdomen

## 2023-06-07 LAB
ALBUMIN SERPL-MCNC: 3.9 G/DL (ref 3.5–5)
ALBUMIN/GLOB SERPL: 1.2 (ref 1.1–2.2)
ALP SERPL-CCNC: 71 U/L (ref 45–117)
ALT SERPL-CCNC: 26 U/L (ref 12–78)
ANION GAP SERPL CALC-SCNC: 5 MMOL/L (ref 5–15)
AST SERPL-CCNC: 14 U/L (ref 15–37)
BILIRUB SERPL-MCNC: 0.5 MG/DL (ref 0.2–1)
BUN SERPL-MCNC: 10 MG/DL (ref 6–20)
BUN/CREAT SERPL: 10 (ref 12–20)
CALCIUM SERPL-MCNC: 9.2 MG/DL (ref 8.5–10.1)
CHLORIDE SERPL-SCNC: 104 MMOL/L (ref 97–108)
CHOLEST SERPL-MCNC: 172 MG/DL
CK SERPL-CCNC: 182 U/L (ref 39–308)
CO2 SERPL-SCNC: 31 MMOL/L (ref 21–32)
COMMENT:: NORMAL
CREAT SERPL-MCNC: 1.02 MG/DL (ref 0.7–1.3)
ERYTHROCYTE [DISTWIDTH] IN BLOOD BY AUTOMATED COUNT: 12.5 % (ref 11.5–14.5)
EST. AVERAGE GLUCOSE BLD GHB EST-MCNC: 126 MG/DL
GLOBULIN SER CALC-MCNC: 3.2 G/DL (ref 2–4)
GLUCOSE SERPL-MCNC: 144 MG/DL (ref 65–100)
HBA1C MFR BLD: 6 % (ref 4–5.6)
HCT VFR BLD AUTO: 46.9 % (ref 36.6–50.3)
HDLC SERPL-MCNC: 39 MG/DL
HDLC SERPL: 4.4 (ref 0–5)
HGB BLD-MCNC: 15.1 G/DL (ref 12.1–17)
LDLC SERPL CALC-MCNC: 100.2 MG/DL (ref 0–100)
MCH RBC QN AUTO: 31.8 PG (ref 26–34)
MCHC RBC AUTO-ENTMCNC: 32.2 G/DL (ref 30–36.5)
MCV RBC AUTO: 98.7 FL (ref 80–99)
NRBC # BLD: 0 K/UL (ref 0–0.01)
NRBC BLD-RTO: 0 PER 100 WBC
PLATELET # BLD AUTO: 293 K/UL (ref 150–400)
PMV BLD AUTO: 9.6 FL (ref 8.9–12.9)
POTASSIUM SERPL-SCNC: 4.7 MMOL/L (ref 3.5–5.1)
PROT SERPL-MCNC: 7.1 G/DL (ref 6.4–8.2)
PSA SERPL-MCNC: 1.9 NG/ML (ref 0.01–4)
RBC # BLD AUTO: 4.75 M/UL (ref 4.1–5.7)
SODIUM SERPL-SCNC: 140 MMOL/L (ref 136–145)
SPECIMEN HOLD: NORMAL
TRIGL SERPL-MCNC: 164 MG/DL
VLDLC SERPL CALC-MCNC: 32.8 MG/DL
WBC # BLD AUTO: 7.8 K/UL (ref 4.1–11.1)

## 2023-06-09 LAB
APPEARANCE UR: CLEAR
BILIRUB UR QL: NEGATIVE
COLOR UR: NORMAL
GLUCOSE UR STRIP.AUTO-MCNC: NEGATIVE MG/DL
HGB UR QL STRIP: NEGATIVE
KETONES UR QL STRIP.AUTO: NEGATIVE MG/DL
LEUKOCYTE ESTERASE UR QL STRIP.AUTO: NEGATIVE
NITRITE UR QL STRIP.AUTO: NEGATIVE
PH UR STRIP: 6 (ref 5–8)
PROT UR STRIP-MCNC: NEGATIVE MG/DL
SP GR UR REFRACTOMETRY: 1.03 (ref 1–1.03)
UROBILINOGEN UR QL STRIP.AUTO: 0.2 EU/DL (ref 0.2–1)

## 2023-06-20 ENCOUNTER — OFFICE VISIT (OUTPATIENT)
Facility: CLINIC | Age: 58
End: 2023-06-20
Payer: COMMERCIAL

## 2023-06-20 VITALS
HEART RATE: 87 BPM | RESPIRATION RATE: 17 BRPM | HEIGHT: 71 IN | BODY MASS INDEX: 29.2 KG/M2 | OXYGEN SATURATION: 97 % | DIASTOLIC BLOOD PRESSURE: 84 MMHG | TEMPERATURE: 98.1 F | WEIGHT: 208.6 LBS | SYSTOLIC BLOOD PRESSURE: 100 MMHG

## 2023-06-20 DIAGNOSIS — J41.0 SMOKERS' COUGH (HCC): ICD-10-CM

## 2023-06-20 DIAGNOSIS — R06.81 WITNESSED APNEIC SPELLS: ICD-10-CM

## 2023-06-20 DIAGNOSIS — F51.04 PSYCHOPHYSIOLOGICAL INSOMNIA: ICD-10-CM

## 2023-06-20 DIAGNOSIS — R40.0 UNCONTROLLED DAYTIME SOMNOLENCE: ICD-10-CM

## 2023-06-20 DIAGNOSIS — R06.83 LOUD SNORING: Primary | ICD-10-CM

## 2023-06-20 DIAGNOSIS — R06.02 SOB (SHORTNESS OF BREATH): ICD-10-CM

## 2023-06-20 DIAGNOSIS — F17.210 CIGARETTE SMOKER: ICD-10-CM

## 2023-06-20 PROCEDURE — 99214 OFFICE O/P EST MOD 30 MIN: CPT | Performed by: NURSE PRACTITIONER

## 2023-06-20 RX ORDER — TRAZODONE HYDROCHLORIDE 100 MG/1
100 TABLET ORAL NIGHTLY
Qty: 30 TABLET | Refills: 1 | Status: SHIPPED | OUTPATIENT
Start: 2023-06-20

## 2023-06-20 ASSESSMENT — PATIENT HEALTH QUESTIONNAIRE - PHQ9
SUM OF ALL RESPONSES TO PHQ QUESTIONS 1-9: 18
7. TROUBLE CONCENTRATING ON THINGS, SUCH AS READING THE NEWSPAPER OR WATCHING TELEVISION: 3
6. FEELING BAD ABOUT YOURSELF - OR THAT YOU ARE A FAILURE OR HAVE LET YOURSELF OR YOUR FAMILY DOWN: 3
8. MOVING OR SPEAKING SO SLOWLY THAT OTHER PEOPLE COULD HAVE NOTICED. OR THE OPPOSITE, BEING SO FIGETY OR RESTLESS THAT YOU HAVE BEEN MOVING AROUND A LOT MORE THAN USUAL: 0
SUM OF ALL RESPONSES TO PHQ9 QUESTIONS 1 & 2: 6
2. FEELING DOWN, DEPRESSED OR HOPELESS: 3
5. POOR APPETITE OR OVEREATING: 0
SUM OF ALL RESPONSES TO PHQ QUESTIONS 1-9: 18
3. TROUBLE FALLING OR STAYING ASLEEP: 3
1. LITTLE INTEREST OR PLEASURE IN DOING THINGS: 3
10. IF YOU CHECKED OFF ANY PROBLEMS, HOW DIFFICULT HAVE THESE PROBLEMS MADE IT FOR YOU TO DO YOUR WORK, TAKE CARE OF THINGS AT HOME, OR GET ALONG WITH OTHER PEOPLE: 1
4. FEELING TIRED OR HAVING LITTLE ENERGY: 3
9. THOUGHTS THAT YOU WOULD BE BETTER OFF DEAD, OR OF HURTING YOURSELF: 0

## 2023-06-20 NOTE — PROGRESS NOTES
Chief Complaint   Patient presents with    Mental Health Problem       SUBJECTIVE:    Jose A Phillips is a 65 y.o. male who is here today to discuss ongoing issues with difficulty falling asleep at night, excessive daytime somnolence, witnessed apnea events, as well as ongoing tobacco use in the form of cigarettes with chronic cough and shortness of breath. He is accompanied by his wife today. Previously, we had addressed likely symptoms of obstructive sleep apnea, and the patient was referred to pulmonology for further evaluation. However, the patient states that he was never contacted for a sleep study or by a pulmonologist office. He continues to have loud snoring, excessive daytime somnolence, and his wife has witnessed several apneic episodes while he was sleeping. He states he simply has no energy during the day, and has even missed work. He admits also to some shortness of breath which has been bothersome to him, and continues to smoke cigarettes. He also has a chronic cough which she is concerned about. He denies any hemoptysis. Previous labs were also reviewed prior to patient encounter and during patient encounter with patient and spouse. Current Outpatient Medications   Medication Sig Dispense Refill    Multiple Vitamin (MULTIVITAMIN PO) Take by mouth      B Complex Vitamins (B COMPLEX PO) Take by mouth      traZODone (DESYREL) 100 MG tablet Take 1 tablet by mouth nightly 30 tablet 1    hydrocortisone (CORTEF) 20 MG tablet Take 1 tablet by mouth daily      levothyroxine (SYNTHROID) 175 MCG tablet Take 1 tablet by mouth every morning (before breakfast)      omeprazole (PRILOSEC OTC) 20 MG tablet Take 1 tablet by mouth      simvastatin (ZOCOR) 40 MG tablet Take 1 tablet by mouth      testosterone cypionate (DEPOTESTOTERONE CYPIONATE) 200 MG/ML injection Inject 0.5 mLs into the muscle. No current facility-administered medications for this visit.      Past Medical History:

## 2023-06-20 NOTE — PROGRESS NOTES
Room B1    Identified pt with two pt identifiers(name and ). Reviewed record in preparation for visit and have obtained necessary documentation. All patient medications has been reviewed. Chief Complaint   Patient presents with    Mental Health Problem       Health Maintenance Review: Patient reminded of \"due or due soon\" health maintenance. I have asked the patient to contact his/her primary care provider (PCP) for follow-up on his/her health maintenance. Vitals:    23 1600   BP: 100/84   Site: Left Upper Arm   Position: Sitting   Pulse: 87   Resp: 17   Temp: 98.1 °F (36.7 °C)   TempSrc: Oral   SpO2: 97%   Weight: 208 lb 9.6 oz (94.6 kg)   Height: 5' 11\" (1.803 m)      Pain Score:   0 - No pain   PHQ-9  2023   Little interest or pleasure in doing things 3   Little interest or pleasure in doing things -   Feeling down, depressed, or hopeless 3   Trouble falling or staying asleep, or sleeping too much 3   Feeling tired or having little energy 3   Poor appetite or overeating 0   Feeling bad about yourself - or that you are a failure or have let yourself or your family down 3   Trouble concentrating on things, such as reading the newspaper or watching television 3   Moving or speaking so slowly that other people could have noticed. Or the opposite - being so fidgety or restless that you have been moving around a lot more than usual 0   Thoughts that you would be better off dead, or of hurting yourself in some way 0   PHQ-2 Score 6   Total Score PHQ 2 -   PHQ-9 Total Score 18   If you checked off any problems, how difficult have these problems made it for you to do your work, take care of things at home, or get along with other people? 1   How difficult have these problems made it for you to do your work, take care of your home and get along with others -      Amb Fall Risk Assessment and TUG Test 2022   Total Score 1   Some encounter information is confidential and restricted.  Go to Review

## 2023-12-05 ENCOUNTER — HOSPITAL ENCOUNTER (OUTPATIENT)
Facility: HOSPITAL | Age: 58
Discharge: HOME | End: 2023-12-08
Attending: INTERNAL MEDICINE
Payer: COMMERCIAL

## 2023-12-05 DIAGNOSIS — F17.210 CIGARETTE SMOKER: ICD-10-CM

## 2023-12-05 DIAGNOSIS — Z87.891 PERSONAL HISTORY OF TOBACCO USE: ICD-10-CM

## 2023-12-05 PROCEDURE — 71271 CT THORAX LUNG CANCER SCR C-: CPT

## 2024-03-04 ENCOUNTER — OFFICE VISIT (OUTPATIENT)
Facility: CLINIC | Age: 59
End: 2024-03-04
Payer: COMMERCIAL

## 2024-03-04 VITALS
OXYGEN SATURATION: 97 % | WEIGHT: 198.8 LBS | BODY MASS INDEX: 27.83 KG/M2 | DIASTOLIC BLOOD PRESSURE: 78 MMHG | RESPIRATION RATE: 16 BRPM | TEMPERATURE: 98 F | SYSTOLIC BLOOD PRESSURE: 126 MMHG | HEART RATE: 85 BPM | HEIGHT: 71 IN

## 2024-03-04 DIAGNOSIS — E78.2 MIXED HYPERLIPIDEMIA: ICD-10-CM

## 2024-03-04 DIAGNOSIS — F19.20 SUBSTANCE ADDICTION (HCC): ICD-10-CM

## 2024-03-04 DIAGNOSIS — Z71.6 ENCOUNTER FOR TOBACCO USE CESSATION COUNSELING: ICD-10-CM

## 2024-03-04 DIAGNOSIS — F51.04 PSYCHOPHYSIOLOGICAL INSOMNIA: ICD-10-CM

## 2024-03-04 DIAGNOSIS — J41.0 SMOKERS' COUGH (HCC): ICD-10-CM

## 2024-03-04 DIAGNOSIS — F17.210 CIGARETTE SMOKER: ICD-10-CM

## 2024-03-04 DIAGNOSIS — Z71.51 DRUG ABUSE COUNSELING AND SURVEILLANCE OF DRUG ABUSER: ICD-10-CM

## 2024-03-04 DIAGNOSIS — F33.1 MODERATE EPISODE OF RECURRENT MAJOR DEPRESSIVE DISORDER (HCC): Primary | ICD-10-CM

## 2024-03-04 DIAGNOSIS — R73.03 PREDIABETES: ICD-10-CM

## 2024-03-04 PROCEDURE — 99214 OFFICE O/P EST MOD 30 MIN: CPT | Performed by: NURSE PRACTITIONER

## 2024-03-04 RX ORDER — TRAZODONE HYDROCHLORIDE 100 MG/1
100 TABLET ORAL NIGHTLY
Qty: 30 TABLET | Refills: 1 | Status: SHIPPED | OUTPATIENT
Start: 2024-03-04

## 2024-03-04 RX ORDER — BUPROPION HYDROCHLORIDE 150 MG/1
150 TABLET ORAL EVERY MORNING
Qty: 90 TABLET | Refills: 0 | Status: SHIPPED | OUTPATIENT
Start: 2024-03-04

## 2024-03-04 RX ORDER — ROSUVASTATIN CALCIUM 20 MG/1
20 TABLET, COATED ORAL DAILY
COMMUNITY
Start: 2023-12-19

## 2024-03-04 ASSESSMENT — PATIENT HEALTH QUESTIONNAIRE - PHQ9
1. LITTLE INTEREST OR PLEASURE IN DOING THINGS: 3
2. FEELING DOWN, DEPRESSED OR HOPELESS: 3
7. TROUBLE CONCENTRATING ON THINGS, SUCH AS READING THE NEWSPAPER OR WATCHING TELEVISION: 3
8. MOVING OR SPEAKING SO SLOWLY THAT OTHER PEOPLE COULD HAVE NOTICED. OR THE OPPOSITE, BEING SO FIGETY OR RESTLESS THAT YOU HAVE BEEN MOVING AROUND A LOT MORE THAN USUAL: 0
SUM OF ALL RESPONSES TO PHQ QUESTIONS 1-9: 17
3. TROUBLE FALLING OR STAYING ASLEEP: 3
10. IF YOU CHECKED OFF ANY PROBLEMS, HOW DIFFICULT HAVE THESE PROBLEMS MADE IT FOR YOU TO DO YOUR WORK, TAKE CARE OF THINGS AT HOME, OR GET ALONG WITH OTHER PEOPLE: 2
SUM OF ALL RESPONSES TO PHQ9 QUESTIONS 1 & 2: 6
5. POOR APPETITE OR OVEREATING: 0
9. THOUGHTS THAT YOU WOULD BE BETTER OFF DEAD, OR OF HURTING YOURSELF: 0
SUM OF ALL RESPONSES TO PHQ QUESTIONS 1-9: 17
4. FEELING TIRED OR HAVING LITTLE ENERGY: 3
SUM OF ALL RESPONSES TO PHQ QUESTIONS 1-9: 17
SUM OF ALL RESPONSES TO PHQ QUESTIONS 1-9: 17
6. FEELING BAD ABOUT YOURSELF - OR THAT YOU ARE A FAILURE OR HAVE LET YOURSELF OR YOUR FAMILY DOWN: 2

## 2024-03-04 NOTE — PROGRESS NOTES
Brandon Rogel  is a 58 y.o. male     Chief Complaint   Patient presents with    Thyroid Problem     Follow up       /78 (Site: Left Upper Arm, Position: Sitting, Cuff Size: Large Adult)   Pulse 85   Temp 98 °F (36.7 °C) (Oral)   Resp 16   Ht 1.803 m (5' 11\")   Wt 90.2 kg (198 lb 12.8 oz)   SpO2 97%   BMI 27.73 kg/m²     Health Maintenance Due   Topic Date Due    Hepatitis B vaccine (1 of 3 - 3-dose series) Never done    Pneumococcal 0-64 years Vaccine (1 - PCV) Never done    DTaP/Tdap/Td vaccine (1 - Tdap) Never done    Shingles vaccine (1 of 2) Never done    Flu vaccine (1) Never done    COVID-19 Vaccine (4 - 2023-24 season) 09/01/2023         \"Have you been to the ER, urgent care clinic since your last visit?  Hospitalized since your last visit?\"    NO    “Have you seen or consulted any other health care providers outside of [facility] since your last visit?”    NO

## 2024-03-04 NOTE — PROGRESS NOTES
Chief Complaint   Patient presents with    Thyroid Problem     Follow up       SUBJECTIVE:    Brandon Rogel Jr. is a 58 y.o. male who is here today for a follow up appointment regarding current medical conditions including: Depression, substance addiction, smoker's cough, mixed hyperlipidemia, prediabetes, insomnia, and long-term use of nicotine    To recap, in the earlier part of 2023, the patient had gone to a 30-day inpatient rehab for drug dependence/addiction.  He followed up with me shortly thereafter in late February and seemed to be doing well.  He was advised to establish with a local N.A. group and attend meetings.  He states he attended a few, then became more lax with his attendance and stopped going altogether.  A few months later, he states that he relapsed on had done some cocaine.  He had a few episodes of use, then stopped for several months thereafter.  Still, he did not attend meetings regularly, and has not established with a sponsor.  He admits that he used methamphetamine again in early February for about a week, but has not used anything since that time.  He states he went to an N.A. meeting a week ago which she enjoyed somewhat.  Chiefly, he is concerned about his addiction.  He admits that this has been causing issues for him with family, and is concerned about his relapses.  He does have symptoms of depression and feels that he needs to be on something to help with this.    He continues to smoke cigarettes on a regular basis.  He continues to have a chronic cough as well.    He has a history of prediabetes.  His last hemoglobin A1c was approximately 6.0%.  He has been previously advised to focus on avoidance of concentrated sweets and excess carbohydrates in his diet.    He continues to have difficulty falling asleep, and takes trazodone nightly to help.  He states the medication is fairly effective and useful overall.      Current Outpatient Medications   Medication Sig Dispense

## 2024-03-05 LAB
ALBUMIN SERPL-MCNC: 4 G/DL (ref 3.5–5)
ALBUMIN/GLOB SERPL: 1.3 (ref 1.1–2.2)
ALP SERPL-CCNC: 77 U/L (ref 45–117)
ALT SERPL-CCNC: 22 U/L (ref 12–78)
ANION GAP SERPL CALC-SCNC: 6 MMOL/L (ref 5–15)
AST SERPL-CCNC: 7 U/L (ref 15–37)
BILIRUB SERPL-MCNC: 0.5 MG/DL (ref 0.2–1)
BUN SERPL-MCNC: 13 MG/DL (ref 6–20)
BUN/CREAT SERPL: 11 (ref 12–20)
CALCIUM SERPL-MCNC: 9 MG/DL (ref 8.5–10.1)
CHLORIDE SERPL-SCNC: 104 MMOL/L (ref 97–108)
CHOLEST SERPL-MCNC: 206 MG/DL
CO2 SERPL-SCNC: 28 MMOL/L (ref 21–32)
CREAT SERPL-MCNC: 1.18 MG/DL (ref 0.7–1.3)
EST. AVERAGE GLUCOSE BLD GHB EST-MCNC: 123 MG/DL
GLOBULIN SER CALC-MCNC: 3.1 G/DL (ref 2–4)
GLUCOSE SERPL-MCNC: 152 MG/DL (ref 65–100)
HBA1C MFR BLD: 5.9 % (ref 4–5.6)
HDLC SERPL-MCNC: 42 MG/DL
HDLC SERPL: 4.9 (ref 0–5)
LDLC SERPL CALC-MCNC: 103.8 MG/DL (ref 0–100)
POTASSIUM SERPL-SCNC: 3.9 MMOL/L (ref 3.5–5.1)
PROT SERPL-MCNC: 7.1 G/DL (ref 6.4–8.2)
SODIUM SERPL-SCNC: 138 MMOL/L (ref 136–145)
TRIGL SERPL-MCNC: 301 MG/DL
VLDLC SERPL CALC-MCNC: 60.2 MG/DL

## 2024-04-15 ENCOUNTER — OFFICE VISIT (OUTPATIENT)
Facility: CLINIC | Age: 59
End: 2024-04-15
Payer: COMMERCIAL

## 2024-04-15 VITALS
WEIGHT: 200.6 LBS | SYSTOLIC BLOOD PRESSURE: 132 MMHG | OXYGEN SATURATION: 98 % | HEART RATE: 96 BPM | BODY MASS INDEX: 28.08 KG/M2 | HEIGHT: 71 IN | DIASTOLIC BLOOD PRESSURE: 82 MMHG | TEMPERATURE: 100 F | RESPIRATION RATE: 16 BRPM

## 2024-04-15 DIAGNOSIS — F33.1 MODERATE EPISODE OF RECURRENT MAJOR DEPRESSIVE DISORDER (HCC): Primary | ICD-10-CM

## 2024-04-15 DIAGNOSIS — M62.830 SPASM OF MUSCLE OF LOWER BACK: ICD-10-CM

## 2024-04-15 DIAGNOSIS — M54.50 ACUTE BILATERAL LOW BACK PAIN WITHOUT SCIATICA: ICD-10-CM

## 2024-04-15 DIAGNOSIS — K21.9 GASTROESOPHAGEAL REFLUX DISEASE WITHOUT ESOPHAGITIS: ICD-10-CM

## 2024-04-15 DIAGNOSIS — R05.3 CHRONIC COUGH: ICD-10-CM

## 2024-04-15 PROCEDURE — 99214 OFFICE O/P EST MOD 30 MIN: CPT | Performed by: NURSE PRACTITIONER

## 2024-04-15 RX ORDER — BENZONATATE 100 MG/1
100 CAPSULE ORAL 3 TIMES DAILY PRN
Qty: 30 CAPSULE | Refills: 1 | Status: SHIPPED | OUTPATIENT
Start: 2024-04-15 | End: 2024-05-05

## 2024-04-15 RX ORDER — BACLOFEN 10 MG/1
10 TABLET ORAL 3 TIMES DAILY
Qty: 30 TABLET | Refills: 2 | Status: SHIPPED | OUTPATIENT
Start: 2024-04-15

## 2024-04-15 RX ORDER — PANTOPRAZOLE SODIUM 40 MG/1
40 TABLET, DELAYED RELEASE ORAL
Qty: 90 TABLET | Refills: 1 | Status: SHIPPED | OUTPATIENT
Start: 2024-04-15

## 2024-04-15 RX ORDER — ETODOLAC 400 MG/1
400 TABLET, FILM COATED ORAL 2 TIMES DAILY
Qty: 60 TABLET | Refills: 2 | Status: SHIPPED | OUTPATIENT
Start: 2024-04-15 | End: 2024-07-14

## 2024-04-15 RX ORDER — BUPROPION HYDROCHLORIDE 300 MG/1
300 TABLET ORAL EVERY MORNING
Qty: 30 TABLET | Refills: 3 | Status: SHIPPED | OUTPATIENT
Start: 2024-04-15

## 2024-04-15 NOTE — PROGRESS NOTES
Brandon Rogel  is a 58 y.o. male     Chief Complaint   Patient presents with    Medication Check     6wk med eval       /82 (Site: Left Upper Arm, Position: Sitting, Cuff Size: Medium Adult)   Pulse 96   Temp 100 °F (37.8 °C) (Oral)   Resp 16   Ht 1.803 m (5' 11\")   Wt 91 kg (200 lb 9.6 oz)   SpO2 98%   BMI 27.98 kg/m²     Health Maintenance Due   Topic Date Due    Hepatitis B vaccine (1 of 3 - 3-dose series) Never done    Pneumococcal 0-64 years Vaccine (1 of 2 - PCV) Never done    DTaP/Tdap/Td vaccine (1 - Tdap) Never done    Shingles vaccine (1 of 2) Never done    COVID-19 Vaccine (4 - 2023-24 season) 09/01/2023         \"Have you been to the ER, urgent care clinic since your last visit?  Hospitalized since your last visit?\"    NO    “Have you seen or consulted any other health care providers outside of [facility] since your last visit?”    NO

## 2024-04-15 NOTE — PROGRESS NOTES
Chief Complaint   Patient presents with    Medication Check     6wk [facility]       SUBJECTIVE:    Brandon Rogel Jr. is a 58 y.o. male who is here today for a follow up appointment regarding his depression and use of Wellbutrin.  He would also like to discuss worsening symptoms of reflux, chronic cough issues, and low back pain.    At our previous encounter, the patient was prescribed Wellbutrin  mg to take daily for management of depression symptoms as well as hopes of curbing his appetite for smoking.  He has been taking the medication as prescribed.  He denies any adverse side effects.  He has noticed some decrease in his overall smoking intake, and feels that his depression is somewhat improved as well.  He denies any adverse side effects of the medication.    Additionally, he has been having worsening symptoms of reflux followed by a great deal of coughing.  He states he has been having some severe sour brash at night while resting, and has awakened on several occasions with pooled acid in the back of his throat.  He has also been having an increased cough as a result.  His cough is nonproductive.    He also complains of low back pain which has been bothering him for the past week or so.  He feels that he may have done something to aggravate it.  He does have a history of back surgery.  He has tried some over-the-counter remedies, but without much improvement in his symptoms.  He denies any bowel/bladder incontinence, and has no symptoms of UTI at this time.    Current Outpatient Medications   Medication Sig Dispense Refill    pantoprazole (PROTONIX) 40 MG tablet Take 1 tablet by mouth every morning (before breakfast) 90 tablet 1    benzonatate (TESSALON) 100 MG capsule Take 1 capsule by mouth 3 times daily as needed for Cough 30 capsule 1    buPROPion (WELLBUTRIN XL) 300 MG extended release tablet Take 1 tablet by mouth every morning 30 tablet 3    etodolac (LODINE) 400 MG tablet Take 1 tablet by

## 2024-04-17 ENCOUNTER — PATIENT MESSAGE (OUTPATIENT)
Facility: CLINIC | Age: 59
End: 2024-04-17

## 2024-04-17 DIAGNOSIS — J20.9 ACUTE BRONCHITIS AND BRONCHIOLITIS: Primary | ICD-10-CM

## 2024-04-17 DIAGNOSIS — J21.9 ACUTE BRONCHITIS AND BRONCHIOLITIS: Primary | ICD-10-CM

## 2024-04-18 RX ORDER — PREDNISONE 10 MG/1
10 TABLET ORAL SEE ADMIN INSTRUCTIONS
Qty: 1 EACH | Refills: 0 | Status: SHIPPED | OUTPATIENT
Start: 2024-04-18

## 2024-04-18 RX ORDER — AMOXICILLIN AND CLAVULANATE POTASSIUM 875; 125 MG/1; MG/1
1 TABLET, FILM COATED ORAL 2 TIMES DAILY
Qty: 20 TABLET | Refills: 0 | Status: SHIPPED | OUTPATIENT
Start: 2024-04-18 | End: 2024-04-28

## 2024-04-18 NOTE — TELEPHONE ENCOUNTER
Kaiden Shell MA 4/18/2024 11:29 AM EDT      ----- Message -----  From: Brandon Rogel Jr. \"Chet\"  Sent: 4/17/2024 8:09 PM EDT  To: *  Subject: Antibiotic request     After I left my appointment on 4/15/24   I started feeling nauseous and got a splitting headache all of a sudden. My chronic cough is much worse and I’m starting to cough up stuff that feels like it’s coming deep down. I get light headed and sweat profusely w/ these coughing spells. I didn’t know I had a fever when I was there. My wife saw it on the summary later that night. She had something w/ same symptoms last week. With all that said I was checking to see I y’all could call in a script for an antibiotic? It would be greatly appreciated. Thank you.

## 2024-04-23 ENCOUNTER — HOSPITAL ENCOUNTER (EMERGENCY)
Facility: HOSPITAL | Age: 59
Discharge: HOME | End: 2024-04-23
Attending: EMERGENCY MEDICINE
Payer: COMMERCIAL

## 2024-04-23 ENCOUNTER — APPOINTMENT (OUTPATIENT)
Facility: HOSPITAL | Age: 59
End: 2024-04-23
Payer: COMMERCIAL

## 2024-04-23 VITALS
RESPIRATION RATE: 16 BRPM | HEART RATE: 54 BPM | SYSTOLIC BLOOD PRESSURE: 125 MMHG | TEMPERATURE: 97.3 F | OXYGEN SATURATION: 95 % | DIASTOLIC BLOOD PRESSURE: 92 MMHG | BODY MASS INDEX: 28 KG/M2 | WEIGHT: 200 LBS | HEIGHT: 71 IN

## 2024-04-23 DIAGNOSIS — J18.9 PRIMARY ATYPICAL PNEUMONIA: Primary | ICD-10-CM

## 2024-04-23 DIAGNOSIS — E86.0 MILD DEHYDRATION: ICD-10-CM

## 2024-04-23 DIAGNOSIS — R05.1 ACUTE COUGH: ICD-10-CM

## 2024-04-23 LAB
ALBUMIN SERPL-MCNC: 3.1 G/DL (ref 3.5–5)
ALBUMIN/GLOB SERPL: 0.7 (ref 1.1–2.2)
ALP SERPL-CCNC: 81 U/L (ref 45–117)
ALT SERPL-CCNC: 37 U/L (ref 12–78)
ANION GAP SERPL CALC-SCNC: 7 MMOL/L (ref 5–15)
AST SERPL-CCNC: 53 U/L (ref 15–37)
BASOPHILS # BLD: 0 K/UL (ref 0–0.1)
BASOPHILS NFR BLD: 0 % (ref 0–1)
BILIRUB SERPL-MCNC: 0.7 MG/DL (ref 0.2–1)
BUN SERPL-MCNC: 25 MG/DL (ref 6–20)
BUN/CREAT SERPL: 20 (ref 12–20)
CALCIUM SERPL-MCNC: 9.7 MG/DL (ref 8.5–10.1)
CHLORIDE SERPL-SCNC: 105 MMOL/L (ref 97–108)
CO2 SERPL-SCNC: 26 MMOL/L (ref 21–32)
CREAT SERPL-MCNC: 1.25 MG/DL (ref 0.7–1.3)
DIFFERENTIAL METHOD BLD: NORMAL
EOSINOPHIL # BLD: 0 K/UL (ref 0–0.4)
EOSINOPHIL NFR BLD: 0 % (ref 0–7)
ERYTHROCYTE [DISTWIDTH] IN BLOOD BY AUTOMATED COUNT: 13 % (ref 11.5–14.5)
GLOBULIN SER CALC-MCNC: 4.7 G/DL (ref 2–4)
GLUCOSE SERPL-MCNC: 143 MG/DL (ref 65–100)
HCT VFR BLD AUTO: 48.5 % (ref 36.6–50.3)
HGB BLD-MCNC: 16.5 G/DL (ref 12.1–17)
IMM GRANULOCYTES # BLD AUTO: 0 K/UL (ref 0–0.04)
IMM GRANULOCYTES NFR BLD AUTO: 0 % (ref 0–0.5)
LYMPHOCYTES # BLD: 1.9 K/UL (ref 0.8–3.5)
LYMPHOCYTES NFR BLD: 19 % (ref 12–49)
MAGNESIUM SERPL-MCNC: 2.2 MG/DL (ref 1.6–2.4)
MCH RBC QN AUTO: 31.9 PG (ref 26–34)
MCHC RBC AUTO-ENTMCNC: 34 G/DL (ref 30–36.5)
MCV RBC AUTO: 93.6 FL (ref 80–99)
MONOCYTES # BLD: 0.8 K/UL (ref 0–1)
MONOCYTES NFR BLD: 8 % (ref 5–13)
NEUTS SEG # BLD: 7.3 K/UL (ref 1.8–8)
NEUTS SEG NFR BLD: 73 % (ref 32–75)
NRBC # BLD: 0 K/UL (ref 0–0.01)
NRBC BLD-RTO: 0 PER 100 WBC
PLATELET # BLD AUTO: 295 K/UL (ref 150–400)
PMV BLD AUTO: 9.3 FL (ref 8.9–12.9)
POTASSIUM SERPL-SCNC: 3.8 MMOL/L (ref 3.5–5.1)
PROT SERPL-MCNC: 7.8 G/DL (ref 6.4–8.2)
RBC # BLD AUTO: 5.18 M/UL (ref 4.1–5.7)
SARS-COV-2 RDRP RESP QL NAA+PROBE: NOT DETECTED
SODIUM SERPL-SCNC: 138 MMOL/L (ref 136–145)
SOURCE: NORMAL
TROPONIN I SERPL HS-MCNC: 9 NG/L (ref 0–76)
WBC # BLD AUTO: 10 K/UL (ref 4.1–11.1)

## 2024-04-23 PROCEDURE — 87635 SARS-COV-2 COVID-19 AMP PRB: CPT

## 2024-04-23 PROCEDURE — 83735 ASSAY OF MAGNESIUM: CPT

## 2024-04-23 PROCEDURE — 99285 EMERGENCY DEPT VISIT HI MDM: CPT

## 2024-04-23 PROCEDURE — 96361 HYDRATE IV INFUSION ADD-ON: CPT

## 2024-04-23 PROCEDURE — 84484 ASSAY OF TROPONIN QUANT: CPT

## 2024-04-23 PROCEDURE — 85025 COMPLETE CBC W/AUTO DIFF WBC: CPT

## 2024-04-23 PROCEDURE — 71045 X-RAY EXAM CHEST 1 VIEW: CPT

## 2024-04-23 PROCEDURE — 6370000000 HC RX 637 (ALT 250 FOR IP): Performed by: EMERGENCY MEDICINE

## 2024-04-23 PROCEDURE — 80053 COMPREHEN METABOLIC PANEL: CPT

## 2024-04-23 PROCEDURE — 2580000003 HC RX 258: Performed by: EMERGENCY MEDICINE

## 2024-04-23 PROCEDURE — 6360000002 HC RX W HCPCS: Performed by: EMERGENCY MEDICINE

## 2024-04-23 PROCEDURE — 96374 THER/PROPH/DIAG INJ IV PUSH: CPT

## 2024-04-23 PROCEDURE — 93005 ELECTROCARDIOGRAM TRACING: CPT | Performed by: EMERGENCY MEDICINE

## 2024-04-23 PROCEDURE — 36415 COLL VENOUS BLD VENIPUNCTURE: CPT

## 2024-04-23 PROCEDURE — 96375 TX/PRO/DX INJ NEW DRUG ADDON: CPT

## 2024-04-23 RX ORDER — CODEINE PHOSPHATE/GUAIFENESIN 10-100MG/5
10 LIQUID (ML) ORAL 3 TIMES DAILY PRN
Qty: 180 ML | Refills: 0 | Status: SHIPPED | OUTPATIENT
Start: 2024-04-23 | End: 2024-04-30

## 2024-04-23 RX ORDER — PREDNISONE 20 MG/1
40 TABLET ORAL DAILY
Qty: 10 TABLET | Refills: 0 | Status: SHIPPED | OUTPATIENT
Start: 2024-04-23 | End: 2024-04-28

## 2024-04-23 RX ORDER — LEVOFLOXACIN 500 MG/1
500 TABLET, FILM COATED ORAL
Status: COMPLETED | OUTPATIENT
Start: 2024-04-23 | End: 2024-04-23

## 2024-04-23 RX ORDER — CODEINE PHOSPHATE AND GUAIFENESIN 10; 100 MG/5ML; MG/5ML
10 SOLUTION ORAL ONCE
Status: COMPLETED | OUTPATIENT
Start: 2024-04-23 | End: 2024-04-23

## 2024-04-23 RX ORDER — KETOROLAC TROMETHAMINE 30 MG/ML
30 INJECTION, SOLUTION INTRAMUSCULAR; INTRAVENOUS
Status: COMPLETED | OUTPATIENT
Start: 2024-04-23 | End: 2024-04-23

## 2024-04-23 RX ORDER — METHYLPREDNISOLONE SODIUM SUCCINATE 125 MG/2ML
125 INJECTION, POWDER, LYOPHILIZED, FOR SOLUTION INTRAMUSCULAR; INTRAVENOUS ONCE
Status: COMPLETED | OUTPATIENT
Start: 2024-04-23 | End: 2024-04-23

## 2024-04-23 RX ORDER — 0.9 % SODIUM CHLORIDE 0.9 %
1000 INTRAVENOUS SOLUTION INTRAVENOUS
Status: COMPLETED | OUTPATIENT
Start: 2024-04-23 | End: 2024-04-23

## 2024-04-23 RX ORDER — LEVOFLOXACIN 500 MG/1
500 TABLET, FILM COATED ORAL DAILY
Qty: 7 TABLET | Refills: 0 | Status: SHIPPED | OUTPATIENT
Start: 2024-04-23 | End: 2024-04-30

## 2024-04-23 RX ADMIN — METHYLPREDNISOLONE SODIUM SUCCINATE 125 MG: 125 INJECTION INTRAMUSCULAR; INTRAVENOUS at 17:41

## 2024-04-23 RX ADMIN — SODIUM CHLORIDE 1000 ML: 9 INJECTION, SOLUTION INTRAVENOUS at 17:40

## 2024-04-23 RX ADMIN — GUAIFENESIN AND CODEINE PHOSPHATE 10 ML: 100; 10 SOLUTION ORAL at 17:44

## 2024-04-23 RX ADMIN — LEVOFLOXACIN 500 MG: 500 TABLET, FILM COATED ORAL at 17:45

## 2024-04-23 RX ADMIN — KETOROLAC TROMETHAMINE 30 MG: 30 INJECTION, SOLUTION INTRAMUSCULAR at 17:43

## 2024-04-23 ASSESSMENT — ENCOUNTER SYMPTOMS
NAUSEA: 1
SHORTNESS OF BREATH: 1
ABDOMINAL PAIN: 0
COUGH: 1

## 2024-04-23 ASSESSMENT — HEART SCORE: ECG: NORMAL

## 2024-04-23 ASSESSMENT — LIFESTYLE VARIABLES
HOW OFTEN DO YOU HAVE A DRINK CONTAINING ALCOHOL: NEVER
HOW MANY STANDARD DRINKS CONTAINING ALCOHOL DO YOU HAVE ON A TYPICAL DAY: PATIENT DOES NOT DRINK

## 2024-04-23 NOTE — DISCHARGE INSTRUCTIONS
It was a pleasure taking care of you at [facility] Emergency Department today.  We know that when you come to [facility], you are entrusting us with your health, comfort, and safety.  Our physicians and nurses honor that trust, and we truly appreciate the opportunity to care for you and your loved ones.      We also value your feedback.  If you receive a survey about your Emergency Department experience today, please fill it out.  We care about our patients' feedback, and we listen to what you have to say.  Thank you!

## 2024-04-23 NOTE — ED PROVIDER NOTES
EMERGENCY DEPARTMENT HISTORY AND PHYSICAL EXAM    Date: 4/23/2024  Patient Name: Brandon Rogel Jr.  Patient Age and Sex: 58 y.o. male  MRN:  [id number]  CSN:  [id number]    History of Present Illness     Chief Complaint   Patient presents with    Chest Pain     Patient wheeled into triage with complaint of substernal CP, SOB, Cough x 1.5 weeks. Per pt, he was seen at PCP when symptoms started, was placed on abx for bronchitis.        History Provided By: Patient and Patient's Wife    Ability to gather history was limited by:     HPI: Brandon Rogel Jr., 58 y.o. 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  For the last few days he has felt very weak, barely getting out of bed.  Frequent episodes of diarrhea.  Poor appetite.  He saw his doctor last week who prescribed Augmentin and a prednisone taper pack which the patient has now completed, without significant improvement in his symptoms.  He has chest pain when coughing      Tobacco Use      Smoking status: Every Day        Packs/day: 1.00        Years: 1 pack/day for 40.0 years (40.0 ttl pk-yrs)        Types: Cigarettes        Passive exposure: Current      Smokeless tobacco: Never     Past History   The patient's medical, surgical, and social history were reviewed by me today.    Current Medications:  No current facility-administered medications on file prior to encounter.     Current Outpatient Medications on File Prior to Encounter   Medication Sig Dispense Refill    amoxicillin-clavulanate (AUGMENTIN) 875-125 MG per tablet Take 1 tablet by mouth 2 times daily for 10 days 20 tablet 0    pantoprazole (PROTONIX) 40 MG tablet Take 1 tablet by mouth every morning (before breakfast) 90 tablet 1    benzonatate (TESSALON) 100 MG capsule Take 1 capsule by mouth 3 times daily as needed for Cough 30 capsule 1    buPROPion (WELLBUTRIN XL) 300 MG extended release tablet Take 1 tablet by mouth every

## 2024-04-24 LAB
EKG ATRIAL RATE: 72 BPM
EKG DIAGNOSIS: NORMAL
EKG P AXIS: 72 DEGREES
EKG P-R INTERVAL: 128 MS
EKG Q-T INTERVAL: 408 MS
EKG QRS DURATION: 98 MS
EKG QTC CALCULATION (BAZETT): 446 MS
EKG R AXIS: 41 DEGREES
EKG T AXIS: 65 DEGREES
EKG VENTRICULAR RATE: 72 BPM

## 2024-04-29 ENCOUNTER — PATIENT MESSAGE (OUTPATIENT)
Facility: CLINIC | Age: 59
End: 2024-04-29

## 2024-04-29 DIAGNOSIS — B37.0 ORAL CANDIDIASIS: Primary | ICD-10-CM

## 2024-04-29 RX ORDER — CLOTRIMAZOLE 10 MG/1
10 LOZENGE ORAL; TOPICAL
Qty: 50 TABLET | Refills: 0 | Status: SHIPPED | OUTPATIENT
Start: 2024-04-29 | End: 2024-05-09

## 2024-04-29 NOTE — TELEPHONE ENCOUNTER
Robina Zelaya LPN 4/29/2024 9:42 AM EDT      ----- Message -----  From: Brandon Rogel Jr. \"Chet\"  Sent: 4/29/2024 9:12 AM EDT  To: *  Subject: Thrush script     Good morning. Can you call in something to [facility] that will get rid of Thrush? I have that now from taking antibiotics. I’m feeling a bit better and can breathe better but still feel crappy. Atleast I haven’t smoked a cig in over two weeks so might as well quit now.

## 2024-05-08 DIAGNOSIS — F51.04 PSYCHOPHYSIOLOGICAL INSOMNIA: ICD-10-CM

## 2024-05-08 NOTE — TELEPHONE ENCOUNTER
PCP: Caesar Engel APRN - NP    Last appt: 4/15/2024    Future Appointments   Date Time Provider Department Center   7/15/2024  3:30 PM Caesar Engel APRN - NP [facility] BS AMB       Requested Prescriptions     Pending Prescriptions Disp Refills    traZODone (DESYREL) 100 MG tablet 30 tablet 1     Sig: Take 1 tablet by mouth nightly

## 2024-05-09 RX ORDER — TRAZODONE HYDROCHLORIDE 100 MG/1
100 TABLET ORAL NIGHTLY
Qty: 90 TABLET | Refills: 0 | Status: SHIPPED | OUTPATIENT
Start: 2024-05-09

## 2024-06-14 ENCOUNTER — HOSPITAL ENCOUNTER (OUTPATIENT)
Facility: HOSPITAL | Age: 59
Discharge: HOME | End: 2024-06-14
Attending: INTERNAL MEDICINE
Payer: COMMERCIAL

## 2024-06-14 DIAGNOSIS — R93.89 ABNORMAL COMPUTERIZED AXIAL TOMOGRAPHY OF CHEST: ICD-10-CM

## 2024-06-14 PROCEDURE — 71250 CT THORAX DX C-: CPT

## 2024-07-15 ENCOUNTER — OFFICE VISIT (OUTPATIENT)
Facility: CLINIC | Age: 59
End: 2024-07-15
Payer: COMMERCIAL

## 2024-07-15 VITALS
TEMPERATURE: 98.1 F | HEART RATE: 78 BPM | BODY MASS INDEX: 27.58 KG/M2 | WEIGHT: 197 LBS | HEIGHT: 71 IN | DIASTOLIC BLOOD PRESSURE: 78 MMHG | SYSTOLIC BLOOD PRESSURE: 130 MMHG | OXYGEN SATURATION: 96 %

## 2024-07-15 DIAGNOSIS — R40.0 DAYTIME SOMNOLENCE: ICD-10-CM

## 2024-07-15 DIAGNOSIS — R06.83 LOUD SNORING: ICD-10-CM

## 2024-07-15 DIAGNOSIS — R06.81 WITNESSED EPISODE OF APNEA: ICD-10-CM

## 2024-07-15 DIAGNOSIS — R73.03 PREDIABETES: ICD-10-CM

## 2024-07-15 DIAGNOSIS — E78.2 MIXED HYPERLIPIDEMIA: ICD-10-CM

## 2024-07-15 DIAGNOSIS — F33.1 MODERATE EPISODE OF RECURRENT MAJOR DEPRESSIVE DISORDER (HCC): ICD-10-CM

## 2024-07-15 DIAGNOSIS — F51.04 PSYCHOPHYSIOLOGICAL INSOMNIA: ICD-10-CM

## 2024-07-15 DIAGNOSIS — F41.1 GAD (GENERALIZED ANXIETY DISORDER): ICD-10-CM

## 2024-07-15 DIAGNOSIS — E29.1 HYPOGONADISM MALE: ICD-10-CM

## 2024-07-15 DIAGNOSIS — Z00.00 ROUTINE PHYSICAL EXAMINATION: Primary | ICD-10-CM

## 2024-07-15 DIAGNOSIS — E03.9 HYPOTHYROIDISM, UNSPECIFIED TYPE: ICD-10-CM

## 2024-07-15 PROCEDURE — 99396 PREV VISIT EST AGE 40-64: CPT | Performed by: NURSE PRACTITIONER

## 2024-07-15 RX ORDER — TRAZODONE HYDROCHLORIDE 100 MG/1
100 TABLET ORAL NIGHTLY
Qty: 90 TABLET | Refills: 0 | Status: SHIPPED | OUTPATIENT
Start: 2024-07-15

## 2024-07-15 SDOH — ECONOMIC STABILITY: INCOME INSECURITY: HOW HARD IS IT FOR YOU TO PAY FOR THE VERY BASICS LIKE FOOD, HOUSING, MEDICAL CARE, AND HEATING?: NOT HARD AT ALL

## 2024-07-15 SDOH — ECONOMIC STABILITY: FOOD INSECURITY: WITHIN THE PAST 12 MONTHS, YOU WORRIED THAT YOUR FOOD WOULD RUN OUT BEFORE YOU GOT MONEY TO BUY MORE.: NEVER TRUE

## 2024-07-15 SDOH — ECONOMIC STABILITY: FOOD INSECURITY: WITHIN THE PAST 12 MONTHS, THE FOOD YOU BOUGHT JUST DIDN'T LAST AND YOU DIDN'T HAVE MONEY TO GET MORE.: NEVER TRUE

## 2024-07-15 NOTE — PROGRESS NOTES
Brandon Rogel JrCatherine is a 58 y.o. male     Chief Complaint   Patient presents with    Annual Exam     fatigue       /78 (Site: Right Upper Arm, Position: Sitting, Cuff Size: Medium Adult)   Pulse 78   Temp 98.1 °F (36.7 °C) (Oral)   Ht 1.803 m (5' 11\")   Wt 89.4 kg (197 lb)   SpO2 96%   BMI 27.48 kg/m²     Health Maintenance Due   Topic Date Due    Hepatitis B vaccine (1 of 3 - 3-dose series) Never done    Pneumococcal 0-64 years Vaccine (1 of 2 - PCV) Never done    DTaP/Tdap/Td vaccine (1 - Tdap) Never done    Shingles vaccine (1 of 2) Never done    COVID-19 Vaccine (4 - 2023-24 season) 09/01/2023         \"Have you been to the ER, urgent care clinic since your last visit?  Hospitalized since your last visit?\"    YES - When: approximately 3 months ago.  Where and Why: [facility] pneumonia.    “Have you seen or consulted any other health care providers outside of [facility] since your last visit?”    NO                     
enlargemant.  Integumentary: No unusual rashes or suspicious skin lesions noted.   Neuro: Non-focal exam, A & O X 3.   Psychiatric: Appropriate affect and demeanor, pleasant and cooperative. Patient's thought content and thought processing appear to be within normal limits.    ASSESSMENT/PLAN:      Diagnosis Orders   1. Routine physical examination  CBC    Urinalysis      2. Mixed hyperlipidemia  Comprehensive Metabolic Panel    Lipid Panel      3. Moderate episode of recurrent major depressive disorder (HCC)        4. JEFFREY (generalized anxiety disorder)        5. Prediabetes  Urinalysis    Hemoglobin A1C      6. Hypothyroidism, unspecified type  TSH + Free T4 Panel      7. Hypogonadism male        8. Psychophysiological insomnia  traZODone (DESYREL) 100 MG tablet      9. Witnessed episode of apnea        10. Daytime somnolence        11. Loud snoring          1: Patient to return in 1 to 2 weeks for fasting labs including: CBC, CMP, lipid panel, TSH, free T4, hemoglobin A1c, and urinalysis.  2: Patient to continue rosuvastatin daily for management of mixed hyperlipidemia.  Patient tolerating well.  3: Patient to continue Wellbutrin  mg daily for management of MDD and JEFFREY.  Will likely discuss further medication in near future.  4: Patient encouraged to focus on healthy lifestyle management with appropriate dietary intake.  Low-fat/low-cholesterol diet recommended.  Avoid concentrated sweets and excess carbohydrates.  Maintain adequate amounts of fiber and water.  Continue to focus on regular exercise patterns for weight loss.  5: Continue levothyroxine daily for management of hypothyroidism.  TSH stable.  6: Continue testosterone replacement for management of hypogonadism.  Patient tolerating well.  7: Patient advised to contact pulmonology for STEPHEN screening due to symptoms of witnessed apnea, daytime somnolence, and loud snoring.  8: Continue all other medications as directed.  9: Patient to follow-up with me

## 2024-07-18 ENCOUNTER — LAB (OUTPATIENT)
Facility: CLINIC | Age: 59
End: 2024-07-18

## 2024-07-18 DIAGNOSIS — Z00.00 ROUTINE PHYSICAL EXAMINATION: ICD-10-CM

## 2024-07-18 DIAGNOSIS — E78.2 MIXED HYPERLIPIDEMIA: ICD-10-CM

## 2024-07-18 DIAGNOSIS — R73.03 PREDIABETES: ICD-10-CM

## 2024-07-18 DIAGNOSIS — E03.9 HYPOTHYROIDISM, UNSPECIFIED TYPE: ICD-10-CM

## 2024-07-18 LAB
ALBUMIN SERPL-MCNC: 3.7 G/DL (ref 3.5–5)
ALBUMIN/GLOB SERPL: 1.1 (ref 1.1–2.2)
ALP SERPL-CCNC: 62 U/L (ref 45–117)
ALT SERPL-CCNC: 22 U/L (ref 12–78)
ANION GAP SERPL CALC-SCNC: 7 MMOL/L (ref 5–15)
APPEARANCE UR: CLEAR
AST SERPL-CCNC: 10 U/L (ref 15–37)
BILIRUB SERPL-MCNC: 0.4 MG/DL (ref 0.2–1)
BILIRUB UR QL: NEGATIVE
BUN SERPL-MCNC: 17 MG/DL (ref 6–20)
BUN/CREAT SERPL: 15 (ref 12–20)
CALCIUM SERPL-MCNC: 8.9 MG/DL (ref 8.5–10.1)
CHLORIDE SERPL-SCNC: 104 MMOL/L (ref 97–108)
CHOLEST SERPL-MCNC: 225 MG/DL
CO2 SERPL-SCNC: 27 MMOL/L (ref 21–32)
COLOR UR: NORMAL
CREAT SERPL-MCNC: 1.13 MG/DL (ref 0.7–1.3)
ERYTHROCYTE [DISTWIDTH] IN BLOOD BY AUTOMATED COUNT: 13.3 % (ref 11.5–14.5)
EST. AVERAGE GLUCOSE BLD GHB EST-MCNC: 120 MG/DL
GLOBULIN SER CALC-MCNC: 3.5 G/DL (ref 2–4)
GLUCOSE SERPL-MCNC: 143 MG/DL (ref 65–100)
GLUCOSE UR STRIP.AUTO-MCNC: NEGATIVE MG/DL
HBA1C MFR BLD: 5.8 % (ref 4–5.6)
HCT VFR BLD AUTO: 48.2 % (ref 36.6–50.3)
HDLC SERPL-MCNC: 45 MG/DL
HDLC SERPL: 5 (ref 0–5)
HGB BLD-MCNC: 15.7 G/DL (ref 12.1–17)
HGB UR QL STRIP: NEGATIVE
KETONES UR QL STRIP.AUTO: NEGATIVE MG/DL
LDLC SERPL CALC-MCNC: 119 MG/DL (ref 0–100)
LEUKOCYTE ESTERASE UR QL STRIP.AUTO: NEGATIVE
MCH RBC QN AUTO: 31.6 PG (ref 26–34)
MCHC RBC AUTO-ENTMCNC: 32.6 G/DL (ref 30–36.5)
MCV RBC AUTO: 97 FL (ref 80–99)
NITRITE UR QL STRIP.AUTO: NEGATIVE
NRBC # BLD: 0 K/UL (ref 0–0.01)
NRBC BLD-RTO: 0 PER 100 WBC
PH UR STRIP: 5.5 (ref 5–8)
PLATELET # BLD AUTO: 305 K/UL (ref 150–400)
PMV BLD AUTO: 9.4 FL (ref 8.9–12.9)
POTASSIUM SERPL-SCNC: 4.1 MMOL/L (ref 3.5–5.1)
PROT SERPL-MCNC: 7.2 G/DL (ref 6.4–8.2)
PROT UR STRIP-MCNC: NEGATIVE MG/DL
RBC # BLD AUTO: 4.97 M/UL (ref 4.1–5.7)
SODIUM SERPL-SCNC: 138 MMOL/L (ref 136–145)
SP GR UR REFRACTOMETRY: 1.02 (ref 1–1.03)
T4 FREE SERPL-MCNC: 0.9 NG/DL (ref 0.8–1.5)
TRIGL SERPL-MCNC: 305 MG/DL
TSH SERPL DL<=0.05 MIU/L-ACNC: 8.91 UIU/ML (ref 0.36–3.74)
UROBILINOGEN UR QL STRIP.AUTO: 0.2 EU/DL (ref 0.2–1)
VLDLC SERPL CALC-MCNC: 61 MG/DL
WBC # BLD AUTO: 9.5 K/UL (ref 4.1–11.1)

## 2024-08-02 ENCOUNTER — TELEPHONE (OUTPATIENT)
Facility: CLINIC | Age: 59
End: 2024-08-02
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
## 2024-08-02 NOTE — TELEPHONE ENCOUNTER
Noted.  I have previously discussed with patient need for rehab/treatment.  He has done this in the past.  I have also recommended Narcotics Anonymous or other similar 12-step programs.
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
## 2024-10-14 NOTE — TELEPHONE ENCOUNTER
Above.    PCP: Cleopatra Hopkins NP    Last appt: 1/3/2023  Future Appointments   Date Time Provider Fredo Mejia   2/23/2023  1:00 PM Cleopatra Hopkins NP [facility] BS AMB       Requested Prescriptions     Pending Prescriptions Disp Refills    traZODone (DESYREL) 50 mg tablet 60 Tablet 1     Sig: Take 1-2 Tablets by mouth nightly.        Prior labs and Blood pressures:  BP Readings from Last 3 Encounters:   01/03/23 132/80   10/24/22 (!) 144/92   09/30/22 136/78     Lab Results   Component Value Date/Time    Sodium 137 09/29/2022 11:18 PM    Potassium 3.5 09/29/2022 11:18 PM    Chloride 104 09/29/2022 11:18 PM    CO2 24 09/29/2022 11:18 PM    Anion gap 9 09/29/2022 11:18 PM    Glucose 137 (H) 09/29/2022 11:18 PM    BUN 11 09/29/2022 11:18 PM    Creatinine 1.14 09/29/2022 11:18 PM    BUN/Creatinine ratio 10 (L) 09/29/2022 11:18 PM    GFR est AA >60 09/29/2022 11:18 PM    GFR est non-AA >60 09/29/2022 11:18 PM    Calcium 9.1 09/29/2022 11:18 PM

## 2024-10-17 ENCOUNTER — TELEPHONE (OUTPATIENT)
Facility: CLINIC | Age: 59
End: 2024-10-17

## 2024-10-17 NOTE — TELEPHONE ENCOUNTER
----- Message from Robson HAND sent at 10/16/2024  3:04 PM EDT -----  Regarding: ECC Escalation To Practice  ECC Escalation To Practice      Type of Escalation: Red Flag Symptom  --------------------------------------------------------------------------------------------------------------------------    Information for Provider:  Patient is looking for appointment for: Symptom, Swelling  Reasons for Message: Unable to reach practice     Additional Information, pt called in because he would like to book an appt with his PCP to get check with the issues that he's having, the back of his throat is swelling and in pain and he is also experience difficulty breathing at night  when he lays down. Pt would like to have  an appt in the early morning or latest in the afternoon.  --------------------------------------------------------------------------------------------------------------------------    Relationship to Patient: Self     Call Back Info: OK to leave message on voicemail  Preferred Call Back Number: Phone  [phone]

## 2024-10-17 NOTE — TELEPHONE ENCOUNTER
pt called in because he would like to book an appt with his PCP to get check with the issues that he's having, the back of his throat is swelling and in pain and he is also experience difficulty breathing at night when he lays down.

## 2024-11-15 ENCOUNTER — OFFICE VISIT (OUTPATIENT)
Facility: CLINIC | Age: 59
End: 2024-11-15

## 2024-11-15 VITALS
RESPIRATION RATE: 16 BRPM | SYSTOLIC BLOOD PRESSURE: 136 MMHG | HEART RATE: 96 BPM | OXYGEN SATURATION: 95 % | DIASTOLIC BLOOD PRESSURE: 84 MMHG | HEIGHT: 71 IN | TEMPERATURE: 98.5 F | BODY MASS INDEX: 30.85 KG/M2 | WEIGHT: 220.4 LBS

## 2024-11-15 DIAGNOSIS — J34.89 RHINORRHEA: ICD-10-CM

## 2024-11-15 DIAGNOSIS — G47.33 OSA (OBSTRUCTIVE SLEEP APNEA): ICD-10-CM

## 2024-11-15 DIAGNOSIS — R06.2 WHEEZING: ICD-10-CM

## 2024-11-15 DIAGNOSIS — R06.02 SHORTNESS OF BREATH: ICD-10-CM

## 2024-11-15 DIAGNOSIS — R73.03 PREDIABETES: ICD-10-CM

## 2024-11-15 DIAGNOSIS — F51.04 PSYCHOPHYSIOLOGICAL INSOMNIA: ICD-10-CM

## 2024-11-15 DIAGNOSIS — R09.81 NASAL CONGESTION: ICD-10-CM

## 2024-11-15 DIAGNOSIS — Z87.891 HISTORY OF CIGARETTE SMOKING: ICD-10-CM

## 2024-11-15 DIAGNOSIS — E03.9 ACQUIRED HYPOTHYROIDISM: Primary | ICD-10-CM

## 2024-11-15 RX ORDER — ALBUTEROL SULFATE 90 UG/1
2 INHALANT RESPIRATORY (INHALATION) EVERY 6 HOURS PRN
Qty: 18 G | Refills: 3 | Status: SHIPPED | OUTPATIENT
Start: 2024-11-15

## 2024-11-15 RX ORDER — TRAZODONE HYDROCHLORIDE 100 MG/1
100 TABLET ORAL NIGHTLY
Qty: 90 TABLET | Refills: 0 | Status: SHIPPED | OUTPATIENT
Start: 2024-11-15

## 2024-11-15 RX ORDER — FLUTICASONE PROPIONATE 50 MCG
2 SPRAY, SUSPENSION (ML) NASAL DAILY
Qty: 16 G | Refills: 5 | Status: SHIPPED | OUTPATIENT
Start: 2024-11-15

## 2024-11-15 NOTE — PROGRESS NOTES
Chief Complaint   Patient presents with    Thyroid Problem     5M        SUBJECTIVE:    Brandon Rogel Jr. is a 59 y.o. male who is here today for a follow up appointment regarding current medical conditions including: Acquired hypothyroidism, prediabetes, and insomnia.  He is also complaining of worsening shortness of breath with wheezing, nasal congestion, and runny nose symptoms for the past couple of weeks.    The patient remains on levothyroxine daily for management of his hypothyroidism.  His last TSH showed to be elevated, but the patient stated that he had missed several doses.  He was directed to be more compliant with daily use.  He has been working on this.  He denies any adverse side effects of the medication and denies any hot/cold intolerance.    He is also being monitored for his prediabetes.  His last hemoglobin A1c was approximately 5.8%.  He is concerned about his weight as he has gained approximately 23 pounds since his last encounter.  He thinks this may be related to his cessation of smoking approximately 7 months ago.  He also has untreated sleep apnea, but plans on picking up his CPAP machine from the pulmonologist office on 12/09/2024.  He continues to have significant snoring, apnea, and daytime somnolence.  He states that this has been very frustrating and causing a lack of energy in general.  He continues to take trazodone nightly for sleep.  He states this helps him to fall asleep, but he often wakes feeling unrested and tired.    More recently, he complains of some noticeable shortness of breath and occasional wheezing.  He has also been experiencing some runny nose symptoms.  He would like something to help with this.          Current Outpatient Medications   Medication Sig Dispense Refill    traZODone (DESYREL) 100 MG tablet Take 1 tablet by mouth nightly 90 tablet 0    albuterol sulfate HFA (PROVENTIL HFA) 108 (90 Base) MCG/ACT inhaler Inhale 2 puffs into the lungs every 6 hours

## 2024-11-15 NOTE — PROGRESS NOTES
Brandon Rogel  is a 59 y.o. male     Chief Complaint   Patient presents with    Thyroid Problem     5M        /84 (Site: Left Upper Arm, Position: Sitting, Cuff Size: Medium Adult)   Pulse 96   Temp 98.5 °F (36.9 °C) (Oral)   Resp 16   Ht 1.803 m (5' 11\")   Wt 100 kg (220 lb 6.4 oz)   SpO2 95%   BMI 30.74 kg/m²     Health Maintenance Due   Topic Date Due    Pneumococcal 0-64 years Vaccine (1 of 2 - PCV) Never done    Hepatitis B vaccine (1 of 3 - 19+ 3-dose series) Never done    DTaP/Tdap/Td vaccine (1 - Tdap) Never done    Shingles vaccine (1 of 2) Never done    Flu vaccine (1) Never done    COVID-19 Vaccine (4 - 2023-24 season) 09/01/2024         \"Have you been to the ER, urgent care clinic since your last visit?  Hospitalized since your last visit?\"    NO    “Have you seen or consulted any other health care providers outside of [facility] since your last visit?”    NO

## 2024-11-16 LAB
EST. AVERAGE GLUCOSE BLD GHB EST-MCNC: 128 MG/DL
HBA1C MFR BLD: 6.1 % (ref 4–5.6)
T4 FREE SERPL-MCNC: 1.3 NG/DL (ref 0.8–1.5)
TSH SERPL DL<=0.05 MIU/L-ACNC: 1.53 UIU/ML (ref 0.36–3.74)

## 2025-03-28 ENCOUNTER — TELEPHONE (OUTPATIENT)
Facility: CLINIC | Age: 60
End: 2025-03-28

## 2025-03-28 NOTE — TELEPHONE ENCOUNTER
Wife called wanted to speak with the nurse.  Wife is on patients HIPAA.  She is concerned about his mental health.  She thinks he might be using drugs not for sure.  She states he is seeing and hearing things.  His anxiety is through the roof, he is sleeping all the time and not working.

## 2025-04-01 NOTE — TELEPHONE ENCOUNTER
Spouse was contacted on 04/01/2025 regarding patient's continued use of drugs and alcohol.  Spoke at length.  Recommend rehab and counseling.  Spouse is patient is unwilling due to cost at this time.  Recommended [facility] or similar for spouse.

## 2025-04-03 ENCOUNTER — OFFICE VISIT (OUTPATIENT)
Facility: CLINIC | Age: 60
End: 2025-04-03
Payer: COMMERCIAL

## 2025-04-03 VITALS
DIASTOLIC BLOOD PRESSURE: 84 MMHG | TEMPERATURE: 98.4 F | BODY MASS INDEX: 30.52 KG/M2 | OXYGEN SATURATION: 97 % | WEIGHT: 218 LBS | RESPIRATION RATE: 16 BRPM | HEIGHT: 71 IN | HEART RATE: 79 BPM | SYSTOLIC BLOOD PRESSURE: 130 MMHG

## 2025-04-03 DIAGNOSIS — E78.2 MIXED HYPERLIPIDEMIA: ICD-10-CM

## 2025-04-03 DIAGNOSIS — F33.1 MODERATE EPISODE OF RECURRENT MAJOR DEPRESSIVE DISORDER (HCC): Primary | ICD-10-CM

## 2025-04-03 DIAGNOSIS — K21.9 GASTROESOPHAGEAL REFLUX DISEASE WITHOUT ESOPHAGITIS: ICD-10-CM

## 2025-04-03 DIAGNOSIS — R09.81 NASAL CONGESTION: ICD-10-CM

## 2025-04-03 DIAGNOSIS — J34.89 RHINORRHEA: ICD-10-CM

## 2025-04-03 DIAGNOSIS — R73.03 PREDIABETES: ICD-10-CM

## 2025-04-03 DIAGNOSIS — F90.2 ATTENTION DEFICIT HYPERACTIVITY DISORDER (ADHD), COMBINED TYPE: ICD-10-CM

## 2025-04-03 DIAGNOSIS — E89.0 POST-OPERATIVE HYPOTHYROIDISM: ICD-10-CM

## 2025-04-03 DIAGNOSIS — F41.9 ANXIETY: ICD-10-CM

## 2025-04-03 DIAGNOSIS — F51.04 PSYCHOPHYSIOLOGICAL INSOMNIA: ICD-10-CM

## 2025-04-03 DIAGNOSIS — M25.50 ARTHRALGIA OF MULTIPLE JOINTS: ICD-10-CM

## 2025-04-03 DIAGNOSIS — R05.3 CHRONIC COUGH: ICD-10-CM

## 2025-04-03 LAB
T4 FREE SERPL-MCNC: 1 NG/DL (ref 0.8–1.5)
TSH SERPL DL<=0.05 MIU/L-ACNC: 0.95 UIU/ML (ref 0.36–3.74)

## 2025-04-03 PROCEDURE — 99214 OFFICE O/P EST MOD 30 MIN: CPT | Performed by: NURSE PRACTITIONER

## 2025-04-03 RX ORDER — DICLOFENAC SODIUM 75 MG/1
75 TABLET, DELAYED RELEASE ORAL 2 TIMES DAILY
Qty: 60 TABLET | Refills: 1 | Status: SHIPPED | OUTPATIENT
Start: 2025-04-03

## 2025-04-03 RX ORDER — BUPROPION HYDROCHLORIDE 150 MG/1
150 TABLET ORAL EVERY MORNING
Qty: 30 TABLET | Refills: 3 | Status: SHIPPED | OUTPATIENT
Start: 2025-04-03

## 2025-04-03 RX ORDER — FEXOFENADINE HCL 180 MG/1
180 TABLET ORAL DAILY
Qty: 90 TABLET | Refills: 1 | Status: SHIPPED | OUTPATIENT
Start: 2025-04-03

## 2025-04-03 RX ORDER — TRAZODONE HYDROCHLORIDE 100 MG/1
100 TABLET ORAL NIGHTLY
Qty: 90 TABLET | Refills: 0 | Status: SHIPPED | OUTPATIENT
Start: 2025-04-03

## 2025-04-03 RX ORDER — FAMOTIDINE 20 MG/1
20 TABLET, FILM COATED ORAL 2 TIMES DAILY
Qty: 180 TABLET | Refills: 1 | Status: SHIPPED | OUTPATIENT
Start: 2025-04-03

## 2025-04-03 SDOH — ECONOMIC STABILITY: FOOD INSECURITY: WITHIN THE PAST 12 MONTHS, YOU WORRIED THAT YOUR FOOD WOULD RUN OUT BEFORE YOU GOT MONEY TO BUY MORE.: NEVER TRUE

## 2025-04-03 SDOH — ECONOMIC STABILITY: FOOD INSECURITY: WITHIN THE PAST 12 MONTHS, THE FOOD YOU BOUGHT JUST DIDN'T LAST AND YOU DIDN'T HAVE MONEY TO GET MORE.: NEVER TRUE

## 2025-04-03 ASSESSMENT — PATIENT HEALTH QUESTIONNAIRE - PHQ9
9. THOUGHTS THAT YOU WOULD BE BETTER OFF DEAD, OR OF HURTING YOURSELF: NOT AT ALL
7. TROUBLE CONCENTRATING ON THINGS, SUCH AS READING THE NEWSPAPER OR WATCHING TELEVISION: NEARLY EVERY DAY
8. MOVING OR SPEAKING SO SLOWLY THAT OTHER PEOPLE COULD HAVE NOTICED. OR THE OPPOSITE, BEING SO FIGETY OR RESTLESS THAT YOU HAVE BEEN MOVING AROUND A LOT MORE THAN USUAL: NOT AT ALL
SUM OF ALL RESPONSES TO PHQ QUESTIONS 1-9: 15
SUM OF ALL RESPONSES TO PHQ QUESTIONS 1-9: 15
4. FEELING TIRED OR HAVING LITTLE ENERGY: NEARLY EVERY DAY
6. FEELING BAD ABOUT YOURSELF - OR THAT YOU ARE A FAILURE OR HAVE LET YOURSELF OR YOUR FAMILY DOWN: MORE THAN HALF THE DAYS
1. LITTLE INTEREST OR PLEASURE IN DOING THINGS: MORE THAN HALF THE DAYS
3. TROUBLE FALLING OR STAYING ASLEEP: NEARLY EVERY DAY
10. IF YOU CHECKED OFF ANY PROBLEMS, HOW DIFFICULT HAVE THESE PROBLEMS MADE IT FOR YOU TO DO YOUR WORK, TAKE CARE OF THINGS AT HOME, OR GET ALONG WITH OTHER PEOPLE: SOMEWHAT DIFFICULT
2. FEELING DOWN, DEPRESSED OR HOPELESS: MORE THAN HALF THE DAYS
SUM OF ALL RESPONSES TO PHQ QUESTIONS 1-9: 15
5. POOR APPETITE OR OVEREATING: NOT AT ALL
SUM OF ALL RESPONSES TO PHQ QUESTIONS 1-9: 15

## 2025-04-03 NOTE — PROGRESS NOTES
Brandon Rogel . is a 59 y.o. male     Chief Complaint   Patient presents with    Thyroid Problem       /84   Pulse 79   Temp 98.4 °F (36.9 °C) (Oral)   Resp 16   Ht 1.803 m (5' 11\")   Wt 98.9 kg (218 lb)   SpO2 97%   BMI 30.40 kg/m²     Health Maintenance Due   Topic Date Due    Hepatitis B vaccine (1 of 3 - 19+ 3-dose series) Never done    DTaP/Tdap/Td vaccine (1 - Tdap) Never done    Shingles vaccine (1 of 2) Never done    Pneumococcal 50+ years Vaccine (1 of 1 - PCV) Never done    COVID-19 Vaccine (4 - 2024-25 season) 09/01/2024    Depression Monitoring  03/04/2025         \"Have you been to the ER, urgent care clinic since your last visit?  Hospitalized since your last visit?\"    NO    “Have you seen or consulted any other health care providers outside of [facility] since your last visit?”    NO

## 2025-04-03 NOTE — PROGRESS NOTES
Chief Complaint   Patient presents with    Thyroid Problem       SUBJECTIVE:    Brandon Rogel Jr. is a 59 y.o. male who is here today for a follow up appointment regarding current medical conditions including: Mixed hyperlipidemia, hypothyroidism, prediabetes, GERD, insomnia, and would like to discuss issues with depression, anxiety, and possible ADHD.  He has also been experiencing some worsening allergy symptoms for the past several weeks and continues to have a chronic cough.    The patient continues to take rosuvastatin daily for management of his mixed hyperlipidemia.  He continues to tolerate the medication well and denies any adverse side effects.  He has had no recent episodes of chest pain, chest pressure, headaches, dizziness, blurred vision, palpitations, or syncope episodes.  Admittedly, he is not as watchful of his diet as he should be.    He remains on levothyroxine daily due to history of postoperative hypothyroidism.  He continues to tolerate the medication well and denies any adverse side effects.  He has a history of prediabetes.  His last hemoglobin A1c was approximately 6.1%.  He has been advised to focus on dietary avoidance of concentrated sweets and excess carbohydrates.  He continues to take omeprazole and famotidine daily for management of his GERD.  His symptoms have been stable and well-controlled.    He remains on trazodone nightly for sleep.  He states the medication continues to be effective and helpful overall.    He continues to have issues with depression and anxiety.  He had previously been on Wellbutrin which had initially been helpful, but discontinued use a few months ago.  He did not inform me of this.  He states he feels like he has \"no energy.\"  This is also prompted him to utilize methamphetamine on occasion that he feels it is helpful to keep him alert and productive.  He states he last used this approximately 2 weeks ago.  He states that as he has grown older, he has

## 2025-04-04 ENCOUNTER — RESULTS FOLLOW-UP (OUTPATIENT)
Facility: CLINIC | Age: 60
End: 2025-04-04

## 2025-04-04 LAB
ALBUMIN SERPL-MCNC: 4 G/DL (ref 3.5–5)
ALBUMIN/GLOB SERPL: 1.1 (ref 1.1–2.2)
ALP SERPL-CCNC: 89 U/L (ref 45–117)
ALT SERPL-CCNC: 128 U/L (ref 12–78)
ANION GAP SERPL CALC-SCNC: 6 MMOL/L (ref 2–12)
AST SERPL-CCNC: 76 U/L (ref 15–37)
BILIRUB SERPL-MCNC: 0.4 MG/DL (ref 0.2–1)
BUN SERPL-MCNC: 13 MG/DL (ref 6–20)
BUN/CREAT SERPL: 12 (ref 12–20)
CALCIUM SERPL-MCNC: 9.4 MG/DL (ref 8.5–10.1)
CHLORIDE SERPL-SCNC: 102 MMOL/L (ref 97–108)
CHOLEST SERPL-MCNC: 180 MG/DL
CO2 SERPL-SCNC: 29 MMOL/L (ref 21–32)
CREAT SERPL-MCNC: 1.13 MG/DL (ref 0.7–1.3)
EST. AVERAGE GLUCOSE BLD GHB EST-MCNC: 131 MG/DL
GLOBULIN SER CALC-MCNC: 3.6 G/DL (ref 2–4)
GLUCOSE SERPL-MCNC: 124 MG/DL (ref 65–100)
HBA1C MFR BLD: 6.2 % (ref 4–5.6)
HDLC SERPL-MCNC: 45 MG/DL
HDLC SERPL: 4 (ref 0–5)
LDLC SERPL CALC-MCNC: 63 MG/DL (ref 0–100)
POTASSIUM SERPL-SCNC: 4.3 MMOL/L (ref 3.5–5.1)
PROT SERPL-MCNC: 7.6 G/DL (ref 6.4–8.2)
SODIUM SERPL-SCNC: 137 MMOL/L (ref 136–145)
TRIGL SERPL-MCNC: 360 MG/DL
VLDLC SERPL CALC-MCNC: 72 MG/DL

## 2025-05-02 ENCOUNTER — LAB (OUTPATIENT)
Facility: CLINIC | Age: 60
End: 2025-05-02

## 2025-05-02 DIAGNOSIS — R79.89 ELEVATED LFTS: Primary | ICD-10-CM

## 2025-05-02 LAB
ALBUMIN SERPL-MCNC: 4 G/DL (ref 3.5–5)
ALBUMIN/GLOB SERPL: 1.1 (ref 1.1–2.2)
ALP SERPL-CCNC: 95 U/L (ref 45–117)
ALT SERPL-CCNC: 31 U/L (ref 12–78)
AST SERPL-CCNC: 23 U/L (ref 15–37)
BILIRUB DIRECT SERPL-MCNC: 0.2 MG/DL (ref 0–0.2)
BILIRUB SERPL-MCNC: 0.7 MG/DL (ref 0.2–1)
GLOBULIN SER CALC-MCNC: 3.6 G/DL (ref 2–4)
HCV AB SER IA-ACNC: 0.05 INDEX
HCV AB SERPL QL IA: NONREACTIVE
PROT SERPL-MCNC: 7.6 G/DL (ref 6.4–8.2)

## 2025-05-05 ENCOUNTER — RESULTS FOLLOW-UP (OUTPATIENT)
Facility: CLINIC | Age: 60
End: 2025-05-05

## 2025-05-15 ENCOUNTER — OFFICE VISIT (OUTPATIENT)
Facility: CLINIC | Age: 60
End: 2025-05-15
Payer: COMMERCIAL

## 2025-05-15 VITALS
HEART RATE: 92 BPM | DIASTOLIC BLOOD PRESSURE: 74 MMHG | OXYGEN SATURATION: 96 % | SYSTOLIC BLOOD PRESSURE: 126 MMHG | RESPIRATION RATE: 16 BRPM | HEIGHT: 71 IN | WEIGHT: 230.8 LBS | BODY MASS INDEX: 32.31 KG/M2 | TEMPERATURE: 98.5 F

## 2025-05-15 DIAGNOSIS — R63.5 WEIGHT GAIN: ICD-10-CM

## 2025-05-15 DIAGNOSIS — F41.9 ANXIETY: ICD-10-CM

## 2025-05-15 DIAGNOSIS — F19.11 HISTORY OF DRUG ABUSE IN REMISSION (HCC): ICD-10-CM

## 2025-05-15 DIAGNOSIS — F33.1 MODERATE EPISODE OF RECURRENT MAJOR DEPRESSIVE DISORDER (HCC): Primary | ICD-10-CM

## 2025-05-15 DIAGNOSIS — Z71.3 DIETARY COUNSELING: ICD-10-CM

## 2025-05-15 PROCEDURE — 99214 OFFICE O/P EST MOD 30 MIN: CPT | Performed by: NURSE PRACTITIONER

## 2025-05-15 RX ORDER — ESCITALOPRAM OXALATE 10 MG/1
10 TABLET ORAL DAILY
COMMUNITY
Start: 2025-05-01

## 2025-05-15 RX ORDER — BUPROPION HYDROCHLORIDE 100 MG/1
100 TABLET ORAL DAILY
COMMUNITY
Start: 2025-05-01

## 2025-05-15 NOTE — PROGRESS NOTES
Brandon Rogel  is a 59 y.o. male     Chief Complaint   Patient presents with    Medication Check     6 wk follow up       /74   Pulse 92   Temp 98.5 °F (36.9 °C) (Oral)   Resp 16   Ht 1.803 m (5' 11\")   Wt 104.7 kg (230 lb 12.8 oz)   SpO2 96%   BMI 32.19 kg/m²     Health Maintenance Due   Topic Date Due    Hepatitis B vaccine (1 of 3 - 19+ 3-dose series) Never done    DTaP/Tdap/Td vaccine (1 - Tdap) Never done    Shingles vaccine (1 of 2) Never done    Pneumococcal 50+ years Vaccine (1 of 1 - PCV) Never done    COVID-19 Vaccine (4 - 2024-25 season) 09/01/2024    Lung Cancer Screening &/or Counseling  06/14/2025         \"Have you been to the ER, urgent care clinic since your last visit?  Hospitalized since your last visit?\"    NO    “Have you seen or consulted any other health care providers outside of [facility] since your last visit?”    NO
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
## (undated) DEVICE — SUTURE VCRL SZ 3-0 L27IN ABSRB UD L26MM SH 1/2 CIR J416H

## (undated) DEVICE — SPLINT 1524055 DOYLE II AIRWAY SET: Brand: DOYLE II ™

## (undated) DEVICE — CATH IV AUTOGRD BC PNK 20GA 25 -- INSYTE

## (undated) DEVICE — PROBE PTEYE-1 FIBER OPTIC: Brand: PTEYE

## (undated) DEVICE — GAUZE SPONGES,8 PLY: Brand: CURITY

## (undated) DEVICE — LAMINECTOMY RICHMOND-LF: Brand: MEDLINE INDUSTRIES, INC.

## (undated) DEVICE — PREP SKN CHLRAPRP APL 26ML STR --

## (undated) DEVICE — THYROID - SMH AMB: Brand: MEDLINE INDUSTRIES, INC.

## (undated) DEVICE — SUTURE MCRYL SZ 4-0 L27IN ABSRB UD L19MM PS-2 1/2 CIR PRIM Y426H

## (undated) DEVICE — SHEAR RMFG HARMONIC FOCUS 9CM -- OEM ITEM L#322125

## (undated) DEVICE — ZIP 8I SURGICAL SKIN CLOSURE DEVICE: Brand: ZIP 8I SURGICAL SKIN CLOSURE DEVICE

## (undated) DEVICE — SYR 10ML CTRL LR LCK NSAF LF --

## (undated) DEVICE — Device

## (undated) DEVICE — INFECTION CONTROL KIT SYS

## (undated) DEVICE — SUTURE CHROMIC GUT SZ 4-0 L18IN ABSRB BRN L13MM P-3 3/8 CIR 1654G

## (undated) DEVICE — SET ADMIN 16ML TBNG L100IN 2 Y INJ SITE IV PIGGY BK DISP (ORDER IN MULIPLES OF 48)

## (undated) DEVICE — GOWN,SIRUS,FABRNF,XL,20/CS: Brand: MEDLINE

## (undated) DEVICE — STERILE-Z SURGICAL PATIENT COVERS CLEAR POLYETHYLENE STERILE UNIVERSAL FIT 10 PER CASE: Brand: STERILE-Z

## (undated) DEVICE — SLIM BODY SKIN STAPLER: Brand: APPOSE ULC

## (undated) DEVICE — PACK,EENT,TURBAN DRAPE,PK II: Brand: MEDLINE

## (undated) DEVICE — NEONATAL-ADULT SPO2 SENSOR: Brand: NELLCOR

## (undated) DEVICE — ELECTRODE PT RET AD L9FT HI MOIST COND ADH HYDRGEL CORDED

## (undated) DEVICE — PREP SKN PREVAIL 40ML APPL --

## (undated) DEVICE — SMOKE EVACUATION PENCIL: Brand: VALLEYLAB

## (undated) DEVICE — TOWEL SURG W17XL27IN STD BLU COT NONFENESTRATED PREWASHED

## (undated) DEVICE — TOWEL 4 PLY TISS 19X30 SUE WHT

## (undated) DEVICE — FIAPC® PROBE W/ FILTER 2200 A OD 2.3MM/6.9FR; L 2.2M/7.2FT: Brand: ERBE

## (undated) DEVICE — SYR 10ML LUER LOK 1/5ML GRAD --

## (undated) DEVICE — STRIP,CLOSURE,WOUND,MEDI-STRIP,1/2X4: Brand: MEDLINE

## (undated) DEVICE — GAUZE SPONGES,12 PLY: Brand: CURITY

## (undated) DEVICE — SUT SLK 2-0SH 30IN BLK --

## (undated) DEVICE — SURGICAL LIGHT

## (undated) DEVICE — SINGLE-USE BIOPSY FORCEPS: Brand: RADIAL JAW 4

## (undated) DEVICE — STERILE POLYISOPRENE POWDER-FREE SURGICAL GLOVES: Brand: PROTEXIS

## (undated) DEVICE — SHEET, T, LAPAROTOMY, STERILE: Brand: MEDLINE

## (undated) DEVICE — DRAPE C-ARMOUR C-ARM KIT --

## (undated) DEVICE — MEDI-VAC NON-CONDUCTIVE SUCTION TUBING: Brand: CARDINAL HEALTH

## (undated) DEVICE — BLUNT DISSECTOR: Brand: ENDO PEANUT

## (undated) DEVICE — MASTISOL ADHESIVE LIQ 2/3ML

## (undated) DEVICE — BULB SYRINGE, IRRIGATION WITH PROTECTIVE CAP, 60 CC, INDIVIDUALLY WRAPPED: Brand: DOVER

## (undated) DEVICE — SYR 3ML LL TIP 1/10ML GRAD --

## (undated) DEVICE — KNIFE

## (undated) DEVICE — TIP SUCT TRNSPAR RIB SURF STD BLB RIG NVENT W/ 5IN1 CONN DYND50138] MEDLINE INDUSTRIES INC]

## (undated) DEVICE — INJECTION THERAPY NEEDLE CATHETER: Brand: INTERJECT

## (undated) DEVICE — HYPODERMIC SAFETY NEEDLE: Brand: MAGELLAN

## (undated) DEVICE — Z DISCONTINUED PER MEDLINE LINE GAS SAMPLING O2/CO2 LNG AD 13 FT NSL W/ TBNG FILTERLINE

## (undated) DEVICE — IV START KIT: Brand: MEDLINE

## (undated) DEVICE — BONE WAX WHITE: Brand: BONE WAX WHITE

## (undated) DEVICE — TRAY CATH 16F URIN MTR LTX -- CONVERT TO ITEM 363111

## (undated) DEVICE — REFLEX ULTRA 45 WITH INTEGRATED CABLE: Brand: COBLATION

## (undated) DEVICE — BASIN EMSIS 16OZ GRAPHITE PLAS KID SHP MOLD GRAD FOR ORAL

## (undated) DEVICE — ADHESIVE SKIN CLOSURE 4X22 CM PREMIERPRO EXOFINFUSION DISP

## (undated) DEVICE — STERILE POLYISOPRENE POWDER-FREE SURGICAL GLOVES WITH EMOLLIENT COATING: Brand: PROTEXIS

## (undated) DEVICE — DISPOSABLE BIPOLAR FORCEPS

## (undated) DEVICE — BLADE ELECTRODE: Brand: EDGE

## (undated) DEVICE — KENDALL SCD EXPRESS SLEEVES, KNEE LENGTH, MEDIUM: Brand: KENDALL SCD

## (undated) DEVICE — SUTURE ABSORBABLE MONOFILAMENT 4-0 SC-1 18 IN PLN GUT 1824H

## (undated) DEVICE — SOLUTION IRRIG 1000ML 0.9% SOD CHL USP POUR PLAS BTL

## (undated) DEVICE — SOLUTION IV 1000ML 0.9% SOD CHL

## (undated) DEVICE — 1200 GUARD II KIT W/5MM TUBE W/O VAC TUBE: Brand: GUARDIAN

## (undated) DEVICE — NEEDLE HYPO 25GA L1.5IN BLU POLYPR HUB S STL REG BVL STR

## (undated) DEVICE — HOOK RETRCT L5MM E SHRP SELF RET SYS LONE STAR

## (undated) DEVICE — ELECTRODE,RADIOTRANSLUCENT,FOAM,5PK: Brand: MEDLINE

## (undated) DEVICE — CONTAINER SPEC 20 ML LID NEUT BUFF FORMALIN 10 % POLYPR STS

## (undated) DEVICE — NEEDLE HYPO 25GA L1.5IN BVL ORIENTED ECLIPSE

## (undated) DEVICE — TRAP ENDOSCP POLYP 2 CHMBR DRAWER TYP

## (undated) DEVICE — CLIP LIG L235CM RESOL 360 BX/20

## (undated) DEVICE — CUFF BLD PRSS AD CLTH SGL TB W/ BAYNT CONN ROUNDED CORNER

## (undated) DEVICE — TELFA NON-ADHERENT ABSORBENT DRESSING: Brand: TELFA

## (undated) DEVICE — GLOVE,SURG,SENSICARE,ALOE,LF,PF,7: Brand: MEDLINE

## (undated) DEVICE — SPHERE STEALTH 12PK/TY --

## (undated) DEVICE — MAGNETIC INSTR DRAPE 20X16: Brand: MEDLINE INDUSTRIES, INC.

## (undated) DEVICE — PACKING 8004008 NEURAY 200PK 25X76MM: Brand: NEURAY ®

## (undated) DEVICE — SUTURE ETHLN SZ 2-0 L18IN NONABSORBABLE BLK L26MM FS 3/8 664G

## (undated) DEVICE — FIAPC® PROBE W/ FILTER 2200 C OD 2.3MM/6.9FR; L 2.2M/7.2FT: Brand: ERBE

## (undated) DEVICE — COVER,TABLE,HEAVY DUTY,60"X90",STRL: Brand: MEDLINE

## (undated) DEVICE — 4-PORT MANIFOLD: Brand: NEPTUNE 2

## (undated) DEVICE — BIPOLAR FORCEPS CORD: Brand: VALLEYLAB

## (undated) DEVICE — DEVON™ KNEE AND BODY STRAP 60" X 3" (1.5 M X 7.6 CM): Brand: DEVON

## (undated) DEVICE — ENDOSCOPIC KIT COMPLIANCE ENDOKIT

## (undated) DEVICE — SNARE ENDOSCP POLYP MED STD AD 2.4X27X240 CM 2.8 MM OVL SENS